# Patient Record
Sex: FEMALE | Race: BLACK OR AFRICAN AMERICAN | NOT HISPANIC OR LATINO | Employment: FULL TIME | URBAN - METROPOLITAN AREA
[De-identification: names, ages, dates, MRNs, and addresses within clinical notes are randomized per-mention and may not be internally consistent; named-entity substitution may affect disease eponyms.]

---

## 2017-04-12 ENCOUNTER — OFFICE VISIT (OUTPATIENT)
Dept: INTERNAL MEDICINE | Facility: CLINIC | Age: 52
End: 2017-04-12
Payer: COMMERCIAL

## 2017-04-12 VITALS
TEMPERATURE: 99 F | DIASTOLIC BLOOD PRESSURE: 84 MMHG | HEART RATE: 85 BPM | SYSTOLIC BLOOD PRESSURE: 132 MMHG | WEIGHT: 233.25 LBS | HEIGHT: 65 IN | OXYGEN SATURATION: 98 % | BODY MASS INDEX: 38.86 KG/M2

## 2017-04-12 DIAGNOSIS — E66.01 SEVERE OBESITY WITH BODY MASS INDEX (BMI) OF 35.0 TO 39.9: ICD-10-CM

## 2017-04-12 DIAGNOSIS — M25.561 ARTHRALGIA OF RIGHT KNEE: Primary | ICD-10-CM

## 2017-04-12 DIAGNOSIS — I10 HYPERTENSION GOAL BP (BLOOD PRESSURE) < 140/90: ICD-10-CM

## 2017-04-12 DIAGNOSIS — E78.5 HYPERLIPIDEMIA LDL GOAL <130: Chronic | ICD-10-CM

## 2017-04-12 PROCEDURE — 99999 PR PBB SHADOW E&M-EST. PATIENT-LVL III: CPT | Mod: PBBFAC,,, | Performed by: INTERNAL MEDICINE

## 2017-04-12 PROCEDURE — 3079F DIAST BP 80-89 MM HG: CPT | Mod: S$GLB,,, | Performed by: INTERNAL MEDICINE

## 2017-04-12 PROCEDURE — 1160F RVW MEDS BY RX/DR IN RCRD: CPT | Mod: S$GLB,,, | Performed by: INTERNAL MEDICINE

## 2017-04-12 PROCEDURE — 3075F SYST BP GE 130 - 139MM HG: CPT | Mod: S$GLB,,, | Performed by: INTERNAL MEDICINE

## 2017-04-12 PROCEDURE — 99214 OFFICE O/P EST MOD 30 MIN: CPT | Mod: S$GLB,,, | Performed by: INTERNAL MEDICINE

## 2017-04-12 NOTE — MR AVS SNAPSHOT
[facility]  [address] [facility] [address]  Phone: [phone]  Fax: [phone]                  Leydi Quintero   2017 4:00 PM   Office Visit    Description:  Female : 1965   Provider:  Miranda Chavez DO   Department:  [facility]           Reason for Visit     stomach issues with Aleve     joint pain           Diagnoses this Visit        Comments    Arthralgia of right knee    -  Primary     Severe obesity with body mass index (BMI) of 35.0 to 39.9                To Do List           Future Appointments        Provider Department Dept Phone    5/3/2017 8:20 AM INTERNAL MEDICINE NURSE, [facility] [phone]      Goals (5 Years of Data)     None       These Medications        Disp Refills Start End    naltrexone-bupropion 8-90 mg TbSR 120 tablet 0 2017     1 tablet daily x 1 week, then 1 tab twice daily x 1 week, then 2 tabs daily and 1 tab in the evening x 1 week, then 2 tablets twice daily.    Pharmacy: [facility] [address] [place] [id number] [facility] [address] AT [facility] [place] [address] [place] [address]  #: [phone]         [facility]     [facility] Nurse Care Line -  Assistance  Unless otherwise directed by your provider, please contact [facility] On-Call, our nurse care line that is available for  assistance.     Registered nurses in the [facility] Center provide: appointment scheduling, clinical advisement, health education, and other advisory services.  Call: [phone] (toll free)               Medications           Message regarding Medications     Verify the changes and/or additions to your medication regime listed below are the same as discussed with your clinician today.  If any of these changes or additions are incorrect, please notify your healthcare provider.        START taking these NEW medications        Refills    naltrexone-bupropion 8-90 mg TbSR 0    Si  "tablet daily x 1 week, then 1 tab twice daily x 1 week, then 2 tabs daily and 1 tab in the evening x 1 week, then 2 tablets twice daily.    Class: Normal           Verify that the below list of medications is an accurate representation of the medications you are currently taking.  If none reported, the list may be blank. If incorrect, please contact your healthcare provider. Carry this list with you in case of emergency.           Current Medications     furosemide (LASIX) 20 MG tablet TAKE 1 TABLET(20 MG) BY MOUTH EVERY DAY    albuterol (PROVENTIL) 2.5 mg /3 mL (0.083 %) nebulizer solution Inhale into the lungs. 1 Solution for Nebulization Inhalation Every 4-6 hours.  J code     fluticasone (FLONASE) 50 mcg/actuation nasal spray 1 spray by Each Nare route 2 (two) times daily.    naltrexone-bupropion 8-90 mg TbSR 1 tablet daily x 1 week, then 1 tab twice daily x 1 week, then 2 tabs daily and 1 tab in the evening x 1 week, then 2 tablets twice daily.    pravastatin (PRAVACHOL) 80 MG tablet Take 1 tablet (80 mg total) by mouth every evening.           Clinical Reference Information           Your Vitals Were     BP Pulse Temp Height    132/84 (BP Location: Left arm, Patient Position: Sitting, BP Method: Manual) 85 99.1 °F (37.3 °C) (Tympanic) 5' 5" (1.651 m)    Weight Last Period SpO2 BMI    105.8 kg (233 lb 4 oz) 11/23/2013 98% 38.81 kg/m2      Blood Pressure          Most Recent Value    BP  132/84      Allergies as of 4/12/2017     Lipitor [Atorvastatin]      Immunizations Administered on Date of Encounter - 4/12/2017     None      Instructions      Bupropion; Naltrexone extended-release tablets  What is this medicine?  BUPROPION; NALTREXONE (byoo PROE pee on; nal TREX one) is a combination product used to promote and maintain weight loss in obese adults or overweight adults who also have weight related medical problems. This medicine should be used with a reduced calorie diet and increased physical " activity.  How should I use this medicine?  Take this medicine by mouth with a glass of water. Follow the directions on the prescription label. Take this medicine in the morning and in the evenings as directed by your healthcare professional. You can take it with or without food. Do not take with high-fat meals as this may increase your risk of seizures. Do not crush, chew, or cut these tablets. Do not take your medicine more often than directed. Do not stop taking this medicine suddenly except upon the advice of your doctor.  A special MedGuide will be given to you by the pharmacist with each prescription and refill. Be sure to read this information carefully each time.   Talk to your pediatrician regarding the use of this medicine in children. Special care may be needed.  What side effects may I notice from receiving this medicine?  Side effects that you should report to your doctor or health care professional as soon as possible:  · allergic reactions like skin rash, itching or hives, swelling of the face, lips, or tongue  · breathing problems  · changes in vision, hearing  · chest pain  · confusion  · dark urine  · depressed mood  · fast or irregular heart beat  · fever  · hallucination, loss of contact with reality  · increased blood pressure  · light-colored stools  · redness, blistering, peeling or loosening of the skin, including inside the mouth  · right upper belly pain  · seizures  · suicidal thoughts or other mood changes  · unusually weak or tired  · vomiting  · yellowing of the eyes or skin  Side effects that usually do not require medical attention (Report these to your doctor or health care professional if they continue or are bothersome.):  · constipation  · diarrhea  · dizziness  · dry mouth  · headache  · nausea  · trouble sleeping  What may interact with this medicine?  Do not take this medicine with any of the following medications:  · any prescription or street opioid drug like codiene, heroin,  methadone  · linezolid  · MAOIs like Carbex, Eldepryl, Marplan, Nardil, and Parnate  · methylene blue (injected into a vein)  · other medicines that contain bupropion like Zyban or Wellbutrin  This medicine may also interact with the following medications:  · alcohol  · certain medicines for anxiety or sleep  · certain medicines for blood pressure like metoprolol, propranolol  · certain medicines for depression or psychotic disturbances  · certain medicines for HIV or AIDS like efavirenz, lopinavir, nelfinavir, ritonavir  · certain medicines for irregular heart beat like propafenone, flecainide  · certain medicines for Parkinson's disease like amantadine, levodopa  · certain medicines for seizures like carbamazepine, phenytoin, phenobarbital  · cimetidine  · clopidogrel  · cyclophosphamide  · disulfiram  · furazolidone  · isoniazid  · nicotine  · orphenadrine  · procarbazine  · steroid medicines like prednisone or cortisone  · stimulant medicines for attention disorders, weight loss, or to stay awake  · tamoxifen  · theophylline  · thioridazine  · thiotepa  · ticlopidine  · tramadol  · warfarin  What if I miss a dose?  If you miss a dose, skip the missed dose and take your next tablet at the regular time. Do not take double or extra doses.  Where should I keep my medicine?  Keep out of the reach of children.  Store at room temperature between 15 and 30 degrees C (59 and 86 degrees F). Throw away any unused medicine after the expiration date.  What should I tell my health care provider before I take this medicine?  They need to know if you have any of these conditions:  · an eating disorder, such as anorexia or bulimia  · diabetes  · glaucoma  · head injury  · heart disease  · high blood pressure  · history of a drug or alcohol abuse problem  · history of a tumor or infection of your brain or spine  · history of stroke  · history of irregular heartbeat  · kidney disease  · liver disease  · mental illness such as  bipolar disorder or psychosis  · seizures  · suicidal thoughts, plans, or attempt; a previous suicide attempt by you or a family member  · an unusual or allergic reaction to bupropion, naltrexone, other medicines, foods, dyes, or preservatives  breast-feeding  · pregnant or trying to become pregnant  What should I watch for while using this medicine?  This medicine is intended to be used in addition to a healthy diet and appropriate exercise. The best results are achieved this way. Do not increase or in any way change your dose without consulting your doctor or health care professional. Do not take this medicine with other prescription or over-the-counter weight loss products without consulting your doctor or health care professional. Your doctor should tell you to stop taking this medicine if you do not lose a certain amount of weight within the first 12 weeks of treatment.   Visit your doctor or health care professional for regular checkups. Your doctor may order blood tests or other tests to see how you are doing.  This medicine may affect blood sugar levels. If you have diabetes, check with your doctor or health care professional before you change your diet or the dose of your diabetic medicine.  Patients and their families should watch out for new or worsening depression or thoughts of suicide. Also watch out for sudden changes in feelings such as feeling anxious, agitated, panicky, irritable, hostile, aggressive, impulsive, severely restless, overly excited and hyperactive, or not being able to sleep. If this happens, especially at the beginning of treatment or after a change in dose, call your health care professional.  Avoid alcoholic drinks while taking this medicine. Drinking large amounts of alcoholic beverages, using sleeping or anxiety medicines, or quickly stopping the use of these agents while taking this medicine may increase your risk for a seizure.  Date Last Reviewed:   NOTE:This sheet is a  summary. It may not cover all possible information. If you have questions about this medicine, talk to your doctor, pharmacist, or health care provider. Copyright© 2016 [employer]             Language Assistance Services     ATTENTION: Language assistance services are available, free of charge. Please call [phone].      ATENCIÓN: Si zuleika hsieh, tiene a kim disposición servicios gratuitos de asistencia lingüística. Llame al [phone].     CHÚ Ý: N?u b?n nói Ti?ng Vi?t, có các d?ch v? h? tr? ngôn ng? mi?n phí dành cho b?n. G?i s? [phone].         [facility] complies with applicable Federal civil rights laws and does not discriminate on the basis of race, color, national origin, age, disability, or sex.

## 2017-04-12 NOTE — PATIENT INSTRUCTIONS
Bupropion; Naltrexone extended-release tablets  What is this medicine?  BUPROPION; NALTREXONE (byoo PROE pee on; nal TREX one) is a combination product used to promote and maintain weight loss in obese adults or overweight adults who also have weight related medical problems. This medicine should be used with a reduced calorie diet and increased physical activity.  How should I use this medicine?  Take this medicine by mouth with a glass of water. Follow the directions on the prescription label. Take this medicine in the morning and in the evenings as directed by your healthcare professional. You can take it with or without food. Do not take with high-fat meals as this may increase your risk of seizures. Do not crush, chew, or cut these tablets. Do not take your medicine more often than directed. Do not stop taking this medicine suddenly except upon the advice of your doctor.  A special MedGuide will be given to you by the pharmacist with each prescription and refill. Be sure to read this information carefully each time.   Talk to your pediatrician regarding the use of this medicine in children. Special care may be needed.  What side effects may I notice from receiving this medicine?  Side effects that you should report to your doctor or health care professional as soon as possible:  · allergic reactions like skin rash, itching or hives, swelling of the face, lips, or tongue  · breathing problems  · changes in vision, hearing  · chest pain  · confusion  · dark urine  · depressed mood  · fast or irregular heart beat  · fever  · hallucination, loss of contact with reality  · increased blood pressure  · light-colored stools  · redness, blistering, peeling or loosening of the skin, including inside the mouth  · right upper belly pain  · seizures  · suicidal thoughts or other mood changes  · unusually weak or tired  · vomiting  · yellowing of the eyes or skin  Side effects that usually do not require medical attention  (Report these to your doctor or health care professional if they continue or are bothersome.):  · constipation  · diarrhea  · dizziness  · dry mouth  · headache  · nausea  · trouble sleeping  What may interact with this medicine?  Do not take this medicine with any of the following medications:  · any prescription or street opioid drug like codiene, heroin, methadone  · linezolid  · MAOIs like Carbex, Eldepryl, Marplan, Nardil, and Parnate  · methylene blue (injected into a vein)  · other medicines that contain bupropion like Zyban or Wellbutrin  This medicine may also interact with the following medications:  · alcohol  · certain medicines for anxiety or sleep  · certain medicines for blood pressure like metoprolol, propranolol  · certain medicines for depression or psychotic disturbances  · certain medicines for HIV or AIDS like efavirenz, lopinavir, nelfinavir, ritonavir  · certain medicines for irregular heart beat like propafenone, flecainide  · certain medicines for Parkinson's disease like amantadine, levodopa  · certain medicines for seizures like carbamazepine, phenytoin, phenobarbital  · cimetidine  · clopidogrel  · cyclophosphamide  · disulfiram  · furazolidone  · isoniazid  · nicotine  · orphenadrine  · procarbazine  · steroid medicines like prednisone or cortisone  · stimulant medicines for attention disorders, weight loss, or to stay awake  · tamoxifen  · theophylline  · thioridazine  · thiotepa  · ticlopidine  · tramadol  · warfarin  What if I miss a dose?  If you miss a dose, skip the missed dose and take your next tablet at the regular time. Do not take double or extra doses.  Where should I keep my medicine?  Keep out of the reach of children.  Store at room temperature between 15 and 30 degrees C (59 and 86 degrees F). Throw away any unused medicine after the expiration date.  What should I tell my health care provider before I take this medicine?  They need to know if you have any of these  conditions:  · an eating disorder, such as anorexia or bulimia  · diabetes  · glaucoma  · head injury  · heart disease  · high blood pressure  · history of a drug or alcohol abuse problem  · history of a tumor or infection of your brain or spine  · history of stroke  · history of irregular heartbeat  · kidney disease  · liver disease  · mental illness such as bipolar disorder or psychosis  · seizures  · suicidal thoughts, plans, or attempt; a previous suicide attempt by you or a family member  · an unusual or allergic reaction to bupropion, naltrexone, other medicines, foods, dyes, or preservatives  breast-feeding  · pregnant or trying to become pregnant  What should I watch for while using this medicine?  This medicine is intended to be used in addition to a healthy diet and appropriate exercise. The best results are achieved this way. Do not increase or in any way change your dose without consulting your doctor or health care professional. Do not take this medicine with other prescription or over-the-counter weight loss products without consulting your doctor or health care professional. Your doctor should tell you to stop taking this medicine if you do not lose a certain amount of weight within the first 12 weeks of treatment.   Visit your doctor or health care professional for regular checkups. Your doctor may order blood tests or other tests to see how you are doing.  This medicine may affect blood sugar levels. If you have diabetes, check with your doctor or health care professional before you change your diet or the dose of your diabetic medicine.  Patients and their families should watch out for new or worsening depression or thoughts of suicide. Also watch out for sudden changes in feelings such as feeling anxious, agitated, panicky, irritable, hostile, aggressive, impulsive, severely restless, overly excited and hyperactive, or not being able to sleep. If this happens, especially at the beginning of  treatment or after a change in dose, call your health care professional.  Avoid alcoholic drinks while taking this medicine. Drinking large amounts of alcoholic beverages, using sleeping or anxiety medicines, or quickly stopping the use of these agents while taking this medicine may increase your risk for a seizure.  Date Last Reviewed:   NOTE:This sheet is a summary. It may not cover all possible information. If you have questions about this medicine, talk to your doctor, pharmacist, or health care provider. Copyright© 2016 [employer]

## 2017-04-14 NOTE — PROGRESS NOTES
Subjective:       Patient ID: Leydi Quintero is a 51 y.o. female.    Chief Complaint: Knee Pain    HPI Comments: Leydi Quintero  51 y.o. Black or  female    Patient presents with:  Knee Pain    HPI: Presents to the clinic with complaint of knee pain. She states it has been going on for a while and is worse at the end of the day. She has stiffness mostly in the morning. She denies swelling or redness. She denies trauma. She has been taking Aleve and states it causes stomach upset even when taken with food. She knows her weight has a lot to do with it so she wants to lose weight. She and several co-workers are wanting to lose together. She is interested in Contrave. She has a coupon.   She does have a history of HTN, well controlled on furosemide.   She has HLD and takes pravastatin. Her lipids are uncontrolled. She is due for repeat labs.                   CHOL                     349 (H)             09/03/2016                  HDL                      49                  09/03/2016                 LDLCALC                  261.8 (H)           09/03/2016                 TRIG                     191 (H)             09/03/2016                 Past Medical History:  Allergic rhinitis  Anxiety  Asthma  Hyperlipidemia  Hypertension  Sarcoidosis    Current Outpatient Prescriptions on File Prior to Visit:  furosemide (LASIX) 20 MG tablet, TAKE 1 TABLET(20 MG) BY MOUTH EVERY DAY, Disp: 30 tablet, Rfl: 6  albuterol (PROVENTIL) 2.5 mg /3 mL (0.083 %) nebulizer solution, Inhale into the lungs. 1 Solution for Nebulization Inhalation Every 4-6 hours.  J code , Disp: , Rfl:   fluticasone (FLONASE) 50 mcg/actuation nasal spray, 1 spray by Each Nare route 2 (two) times daily., Disp: 16 g, Rfl: 3  pravastatin (PRAVACHOL) 80 MG tablet, Take 1 tablet (80 mg total) by mouth every evening., Disp: 30 tablet, Rfl: 6    Allergies:  Review of patient's allergies indicates:   -- Lipitor (atorvastatin)      --  Other reaction(s): Muscle pain      Review of Systems   Constitutional: Negative for fever and unexpected weight change.   Respiratory: Negative for shortness of breath.    Cardiovascular: Negative for chest pain and leg swelling.   Gastrointestinal: Positive for abdominal pain. Negative for blood in stool.   Musculoskeletal: Positive for arthralgias. Negative for joint swelling.   Neurological: Negative for dizziness and headaches.       Objective:      Physical Exam   Constitutional: She is oriented to person, place, and time. She appears well-developed and well-nourished. No distress.   Eyes: No scleral icterus.   Cardiovascular: Normal rate.    Pulmonary/Chest: Effort normal. No respiratory distress.   Neurological: She is alert and oriented to person, place, and time.   Skin: Skin is warm and dry.   Psychiatric: She has a normal mood and affect.   Vitals reviewed.      Assessment:       1. Arthralgia of right knee    2. Severe obesity with body mass index (BMI) of 35.0 to 39.9    3. Hypertension goal BP (blood pressure) < 140/90    4. Hyperlipidemia LDL goal <130        Plan:       Leydi was seen today for knee pain.    Diagnoses and all orders for this visit:    Arthralgia of right knee  -     Likely OA  -     Agree with weight loss efforts  -     Recommend Tylenol    Severe obesity with body mass index (BMI) of 35.0 to 39.9  -     naltrexone-bupropion 8-90 mg TbSR; 1 tablet daily x 1 week, then 1 tab twice daily x 1 week, then 2 tabs daily and 1 tab in the evening x 1 week, then 2 tablets twice daily.    Hypertension goal BP (blood pressure) < 140/90  -     Continue furosemide  -     Monitor    Hyperlipidemia LDL goal <130  -     Check lipid panel    F/U in 1 month for weight and b/p check

## 2017-05-03 ENCOUNTER — LAB VISIT (OUTPATIENT)
Dept: LAB | Facility: HOSPITAL | Age: 52
End: 2017-05-03
Attending: INTERNAL MEDICINE
Payer: COMMERCIAL

## 2017-05-03 ENCOUNTER — CLINICAL SUPPORT (OUTPATIENT)
Dept: INTERNAL MEDICINE | Facility: CLINIC | Age: 52
End: 2017-05-03
Payer: COMMERCIAL

## 2017-05-03 VITALS
BODY MASS INDEX: 37.97 KG/M2 | WEIGHT: 228.19 LBS | HEART RATE: 69 BPM | DIASTOLIC BLOOD PRESSURE: 84 MMHG | OXYGEN SATURATION: 99 % | SYSTOLIC BLOOD PRESSURE: 132 MMHG

## 2017-05-03 DIAGNOSIS — E66.01 SEVERE OBESITY WITH BODY MASS INDEX (BMI) OF 35.0 TO 39.9: ICD-10-CM

## 2017-05-03 DIAGNOSIS — E78.5 HYPERLIPIDEMIA WITH TARGET LDL LESS THAN 130: ICD-10-CM

## 2017-05-03 DIAGNOSIS — I10 HYPERTENSION GOAL BP (BLOOD PRESSURE) < 140/90: ICD-10-CM

## 2017-05-03 LAB
ALBUMIN SERPL BCP-MCNC: 3.6 G/DL
ALP SERPL-CCNC: 90 U/L
ALT SERPL W/O P-5'-P-CCNC: 11 U/L
ANION GAP SERPL CALC-SCNC: 10 MMOL/L
AST SERPL-CCNC: 15 U/L
BILIRUB SERPL-MCNC: 0.6 MG/DL
BUN SERPL-MCNC: 10 MG/DL
CALCIUM SERPL-MCNC: 9.9 MG/DL
CHLORIDE SERPL-SCNC: 110 MMOL/L
CHOLEST/HDLC SERPL: 4.2 {RATIO}
CO2 SERPL-SCNC: 24 MMOL/L
CREAT SERPL-MCNC: 1.1 MG/DL
EST. GFR  (AFRICAN AMERICAN): >60 ML/MIN/1.73 M^2
EST. GFR  (NON AFRICAN AMERICAN): 57.5 ML/MIN/1.73 M^2
GLUCOSE SERPL-MCNC: 109 MG/DL
HDL/CHOLESTEROL RATIO: 24 %
HDLC SERPL-MCNC: 229 MG/DL
HDLC SERPL-MCNC: 55 MG/DL
LDLC SERPL CALC-MCNC: 142.8 MG/DL
NONHDLC SERPL-MCNC: 174 MG/DL
POTASSIUM SERPL-SCNC: 4.2 MMOL/L
PROT SERPL-MCNC: 7.6 G/DL
SODIUM SERPL-SCNC: 144 MMOL/L
TRIGL SERPL-MCNC: 156 MG/DL

## 2017-05-03 PROCEDURE — 80061 LIPID PANEL: CPT

## 2017-05-03 PROCEDURE — 80053 COMPREHEN METABOLIC PANEL: CPT

## 2017-05-03 PROCEDURE — 36415 COLL VENOUS BLD VENIPUNCTURE: CPT

## 2017-05-03 PROCEDURE — 99999 PR PBB SHADOW E&M-EST. PATIENT-LVL III: CPT | Mod: PBBFAC,,,

## 2017-05-03 NOTE — PROGRESS NOTES
B/P and heart rate are stable.   5 pound weight loss noted.   Continue Contrave.  Nurse b/p, hr and weight visit in 1 month.

## 2017-05-03 NOTE — PROGRESS NOTES
Patient came in for blood pressure and weight check. 132/84, pulse 69 weight 228. Patient denies having any symptoms.

## 2017-06-08 ENCOUNTER — TELEPHONE (OUTPATIENT)
Dept: FAMILY MEDICINE | Facility: CLINIC | Age: 52
End: 2017-06-08

## 2017-06-08 NOTE — TELEPHONE ENCOUNTER
----- Message from Ramsey Costello sent at 6/8/2017  8:51 AM CDT -----  Contact: pt  She's calling in regard to rescheduling her nurse visit, please advise, [phone] (home)

## 2017-06-12 RX ORDER — FUROSEMIDE 20 MG/1
TABLET ORAL
Qty: 30 TABLET | Refills: 3 | Status: SHIPPED | OUTPATIENT
Start: 2017-06-12 | End: 2017-09-22 | Stop reason: SDUPTHER

## 2017-06-12 RX ORDER — FUROSEMIDE 20 MG/1
TABLET ORAL
Qty: 30 TABLET | Refills: 0 | OUTPATIENT
Start: 2017-06-12

## 2017-07-03 ENCOUNTER — OFFICE VISIT (OUTPATIENT)
Dept: INTERNAL MEDICINE | Facility: CLINIC | Age: 52
End: 2017-07-03
Payer: COMMERCIAL

## 2017-07-03 VITALS
OXYGEN SATURATION: 96 % | TEMPERATURE: 98 F | DIASTOLIC BLOOD PRESSURE: 82 MMHG | WEIGHT: 235.25 LBS | SYSTOLIC BLOOD PRESSURE: 130 MMHG | BODY MASS INDEX: 39.2 KG/M2 | HEIGHT: 65 IN | HEART RATE: 92 BPM

## 2017-07-03 DIAGNOSIS — J45.21 MILD INTERMITTENT ASTHMA WITH ACUTE EXACERBATION: Primary | ICD-10-CM

## 2017-07-03 PROCEDURE — 99214 OFFICE O/P EST MOD 30 MIN: CPT | Mod: 25,S$GLB,, | Performed by: INTERNAL MEDICINE

## 2017-07-03 PROCEDURE — 94640 AIRWAY INHALATION TREATMENT: CPT | Mod: 59,S$GLB,, | Performed by: INTERNAL MEDICINE

## 2017-07-03 PROCEDURE — 96372 THER/PROPH/DIAG INJ SC/IM: CPT | Mod: S$GLB,,, | Performed by: INTERNAL MEDICINE

## 2017-07-03 PROCEDURE — 99999 PR PBB SHADOW E&M-EST. PATIENT-LVL III: CPT | Mod: PBBFAC,,, | Performed by: INTERNAL MEDICINE

## 2017-07-03 RX ORDER — METHYLPREDNISOLONE ACETATE 40 MG/ML
40 INJECTION, SUSPENSION INTRA-ARTICULAR; INTRALESIONAL; INTRAMUSCULAR; SOFT TISSUE
Status: COMPLETED | OUTPATIENT
Start: 2017-07-03 | End: 2017-07-03

## 2017-07-03 RX ORDER — IPRATROPIUM BROMIDE AND ALBUTEROL SULFATE 2.5; .5 MG/3ML; MG/3ML
3 SOLUTION RESPIRATORY (INHALATION)
Status: COMPLETED | OUTPATIENT
Start: 2017-07-03 | End: 2017-07-03

## 2017-07-03 RX ORDER — ALBUTEROL SULFATE 0.63 MG/3ML
0.63 SOLUTION RESPIRATORY (INHALATION) EVERY 6 HOURS PRN
Qty: 300 ML | Refills: 3 | Status: SHIPPED | OUTPATIENT
Start: 2017-07-03 | End: 2018-10-25

## 2017-07-03 RX ADMIN — METHYLPREDNISOLONE ACETATE 40 MG: 40 INJECTION, SUSPENSION INTRA-ARTICULAR; INTRALESIONAL; INTRAMUSCULAR; SOFT TISSUE at 05:07

## 2017-07-03 RX ADMIN — IPRATROPIUM BROMIDE AND ALBUTEROL SULFATE 3 ML: 2.5; .5 SOLUTION RESPIRATORY (INHALATION) at 05:07

## 2017-07-05 NOTE — PROGRESS NOTES
Leydi Victoria Quintero  51 y.o. Black or  female     Chief Complaint   Patient presents with    Asthma        HPI: Presents to the clinic with complaint of worsening asthma over the past week. She has had wheezing and shortness of breath. She has been using her rescue inhaler without relief. She would like to have a nebulizer machine at home because it has worked well in the past. She denies fever, cough or chest pain.     PMH: Reviewed    MEDS: Reviewed med card    ALLERGIES: Reviewed allergy card    PE: Reviewed vitals  GENERAL: Alert and oriented, no acute distress  HEART: Regular rate and rhythm  LUNGS: Unlabored respirations, bilaterally clear to auscultation     ASSESSMENT/PLAN:    Leydi was seen today for asthma.    Diagnoses and all orders for this visit:    Mild intermittent asthma with acute exacerbation  -     NEBULIZER FOR HOME USE  -     albuterol (ACCUNEB) 0.63 mg/3 mL Nebu; Take 3 mLs (0.63 mg total) by nebulization every 6 (six) hours as needed. Rescue  -     methylPREDNISolone acetate injection 40 mg; Inject 1 mL (40 mg total) into the muscle one time.  -     albuterol-ipratropium 2.5mg-0.5mg/3mL nebulizer solution 3 mL; Take 3 mLs by nebulization one time.    RTC: If symptoms worsen or fail to resolve

## 2017-07-06 DIAGNOSIS — E78.5 HYPERLIPIDEMIA WITH TARGET LDL LESS THAN 130: ICD-10-CM

## 2017-07-07 RX ORDER — PRAVASTATIN SODIUM 80 MG/1
TABLET ORAL
Qty: 30 TABLET | Refills: 3 | Status: SHIPPED | OUTPATIENT
Start: 2017-07-07 | End: 2018-02-12 | Stop reason: SDUPTHER

## 2017-07-11 ENCOUNTER — TELEPHONE (OUTPATIENT)
Dept: INTERNAL MEDICINE | Facility: CLINIC | Age: 52
End: 2017-07-11

## 2017-07-11 NOTE — TELEPHONE ENCOUNTER
----- Message from Nai Castillo sent at 7/7/2017 11:13 AM CDT -----  Contact: [phone]  Patient would like to consult with nurse regarding nebulizer. Please call back at [phone].      Thanks,  Nai Castillo

## 2017-07-11 NOTE — TELEPHONE ENCOUNTER
Contacted patient to inform her that an order has been faxed over to Medical company. Patient verbalized an understanding.

## 2017-07-31 ENCOUNTER — TELEPHONE (OUTPATIENT)
Dept: OBSTETRICS AND GYNECOLOGY | Facility: CLINIC | Age: 52
End: 2017-07-31

## 2017-07-31 DIAGNOSIS — Z01.419 WELL WOMAN EXAM: Primary | ICD-10-CM

## 2017-07-31 NOTE — TELEPHONE ENCOUNTER
----- Message from Tia Choudhary sent at 7/31/2017 11:32 AM CDT -----  Contact: pt  States she would like to schedule her mammogram on 8/31 in the morning at [facility], she see's Dr Ralph on 8/31 at 8:45. Please call pt at [phone]. Thank you

## 2017-08-31 ENCOUNTER — OFFICE VISIT (OUTPATIENT)
Dept: OBSTETRICS AND GYNECOLOGY | Facility: CLINIC | Age: 52
End: 2017-08-31
Payer: COMMERCIAL

## 2017-08-31 ENCOUNTER — HOSPITAL ENCOUNTER (OUTPATIENT)
Dept: RADIOLOGY | Facility: HOSPITAL | Age: 52
Discharge: HOME | End: 2017-08-31
Attending: OBSTETRICS & GYNECOLOGY
Payer: COMMERCIAL

## 2017-08-31 VITALS
WEIGHT: 241.63 LBS | HEIGHT: 65 IN | SYSTOLIC BLOOD PRESSURE: 126 MMHG | BODY MASS INDEX: 40.26 KG/M2 | DIASTOLIC BLOOD PRESSURE: 84 MMHG

## 2017-08-31 DIAGNOSIS — Z01.419 WELL WOMAN EXAM: ICD-10-CM

## 2017-08-31 DIAGNOSIS — Z01.419 ENCOUNTER FOR GYNECOLOGICAL EXAMINATION WITHOUT ABNORMAL FINDING: Primary | ICD-10-CM

## 2017-08-31 PROCEDURE — 77063 BREAST TOMOSYNTHESIS BI: CPT | Mod: 26,,, | Performed by: RADIOLOGY

## 2017-08-31 PROCEDURE — 77067 SCR MAMMO BI INCL CAD: CPT | Mod: TC

## 2017-08-31 PROCEDURE — 99396 PREV VISIT EST AGE 40-64: CPT | Mod: S$GLB,,, | Performed by: OBSTETRICS & GYNECOLOGY

## 2017-08-31 PROCEDURE — 99999 PR PBB SHADOW E&M-EST. PATIENT-LVL III: CPT | Mod: PBBFAC,,, | Performed by: OBSTETRICS & GYNECOLOGY

## 2017-08-31 PROCEDURE — 77067 SCR MAMMO BI INCL CAD: CPT | Mod: 26,,, | Performed by: RADIOLOGY

## 2017-09-21 ENCOUNTER — TELEPHONE (OUTPATIENT)
Dept: INTERNAL MEDICINE | Facility: CLINIC | Age: 52
End: 2017-09-21

## 2017-09-21 NOTE — TELEPHONE ENCOUNTER
----- Message from Mitzy Juarez sent at 9/21/2017 10:41 AM CDT -----  Contact: pt   Call pt regarding a fax that she sent over.   ...[phone]

## 2017-09-21 NOTE — TELEPHONE ENCOUNTER
Returned pt call regarding a fax she wants to know if we received it.   Relayed to pt that we did not receive fax and asked if she could resend the fax.  Verified correct fax number with pt.  Pt states that she would refax.

## 2017-09-22 RX ORDER — FUROSEMIDE 20 MG/1
TABLET ORAL
Qty: 30 TABLET | Refills: 6 | Status: SHIPPED | OUTPATIENT
Start: 2017-09-22 | End: 2018-05-09

## 2017-09-25 ENCOUNTER — TELEPHONE (OUTPATIENT)
Dept: INTERNAL MEDICINE | Facility: CLINIC | Age: 52
End: 2017-09-25

## 2017-09-25 NOTE — TELEPHONE ENCOUNTER
Pt needing clearance from PCP to take Qsymia for weight loss. Appt scheduled 09/27/17 @ 2pm for f/u with Dr. Chavez to discuss. No forms needed, documentation in progress notes that its ok to take is being requested.

## 2017-09-25 NOTE — TELEPHONE ENCOUNTER
----- Message from Rosalia Victoria sent at 9/25/2017 10:25 AM CDT -----  Contact: Patient  Patient states she has left other messages regarding a referral but has not heard back, please call her back at [phone]. Thank you

## 2017-09-25 NOTE — TELEPHONE ENCOUNTER
----- Message from lAina Hoffman sent at 9/25/2017  9:20 AM CDT -----  Contact: Patient  Patient called to speak with the nurse; she stated that Dr. Chavez was supposed to send something to another doctors office giving them permission to treat her and her appointment with them is today at 12:30 and they haven't received anything.     She can be contacted at [phone].    Thanks,  Alina

## 2017-09-25 NOTE — TELEPHONE ENCOUNTER
----- Message from Josefa Dean sent at 9/25/2017 10:35 AM CDT -----  Milena with Dr Warren//weight loss surgeon at [phone]//states the pt has called several time to get a clearance for weight loss medication//pt has appt this afternoon//fax is [phone]//please call if any questions//ella/derrick

## 2017-09-27 ENCOUNTER — OFFICE VISIT (OUTPATIENT)
Dept: INTERNAL MEDICINE | Facility: CLINIC | Age: 52
End: 2017-09-27
Payer: COMMERCIAL

## 2017-09-27 VITALS
WEIGHT: 247.38 LBS | OXYGEN SATURATION: 96 % | HEIGHT: 65 IN | HEART RATE: 85 BPM | SYSTOLIC BLOOD PRESSURE: 122 MMHG | DIASTOLIC BLOOD PRESSURE: 76 MMHG | TEMPERATURE: 98 F | BODY MASS INDEX: 41.22 KG/M2

## 2017-09-27 DIAGNOSIS — Z23 IMMUNIZATION DUE: ICD-10-CM

## 2017-09-27 DIAGNOSIS — E78.5 HYPERLIPIDEMIA LDL GOAL <130: Chronic | ICD-10-CM

## 2017-09-27 DIAGNOSIS — J45.20 MILD INTERMITTENT ASTHMA WITHOUT COMPLICATION: ICD-10-CM

## 2017-09-27 DIAGNOSIS — I10 HYPERTENSION GOAL BP (BLOOD PRESSURE) < 140/90: Primary | Chronic | ICD-10-CM

## 2017-09-27 DIAGNOSIS — E66.01 MORBID OBESITY WITH BMI OF 40.0-44.9, ADULT: ICD-10-CM

## 2017-09-27 PROCEDURE — 90686 IIV4 VACC NO PRSV 0.5 ML IM: CPT | Mod: S$GLB,,, | Performed by: INTERNAL MEDICINE

## 2017-09-27 PROCEDURE — 3074F SYST BP LT 130 MM HG: CPT | Mod: S$GLB,,, | Performed by: INTERNAL MEDICINE

## 2017-09-27 PROCEDURE — 3008F BODY MASS INDEX DOCD: CPT | Mod: S$GLB,,, | Performed by: INTERNAL MEDICINE

## 2017-09-27 PROCEDURE — 99213 OFFICE O/P EST LOW 20 MIN: CPT | Mod: 25,S$GLB,, | Performed by: INTERNAL MEDICINE

## 2017-09-27 PROCEDURE — 99999 PR PBB SHADOW E&M-EST. PATIENT-LVL III: CPT | Mod: PBBFAC,,, | Performed by: INTERNAL MEDICINE

## 2017-09-27 PROCEDURE — 90471 IMMUNIZATION ADMIN: CPT | Mod: S$GLB,,, | Performed by: INTERNAL MEDICINE

## 2017-09-27 PROCEDURE — 3078F DIAST BP <80 MM HG: CPT | Mod: S$GLB,,, | Performed by: INTERNAL MEDICINE

## 2017-09-27 NOTE — PROGRESS NOTES
Leydi Victoria Quintero  51 y.o. Black or  female    Chief Complaint   Patient presents with    Follow-up     HPI: Here today to follow up on chronic conditions.  HTN--stable on furosemide.   HLD--compliant with pravastatin.   Lab Results   Component Value Date    LDLCALC 142.8 05/03/2017   Asthma--stable. She denies recent flares.   She is going to a weight loss clinic and wants to start Qsymia. She needs medical clearance.     Past Medical History:   Diagnosis Date    Allergic rhinitis     Anxiety     Asthma     Hyperlipidemia     Hypertension     Sarcoidosis        Current Outpatient Prescriptions on File Prior to Visit   Medication Sig Dispense Refill    albuterol (ACCUNEB) 0.63 mg/3 mL Nebu Take 3 mLs (0.63 mg total) by nebulization every 6 (six) hours as needed. Rescue 300 mL 3    fluticasone (FLONASE) 50 mcg/actuation nasal spray 1 spray by Each Nare route 2 (two) times daily. 16 g 3    furosemide (LASIX) 20 MG tablet TAKE 1 TABLET(20 MG) BY MOUTH EVERY DAY 30 tablet 6    pravastatin (PRAVACHOL) 80 MG tablet TAKE 1 TABLET(80 MG) BY MOUTH EVERY EVENING 30 tablet 3     No current facility-administered medications on file prior to visit.        Allergies:  Review of patient's allergies indicates:   Allergen Reactions    Lipitor [atorvastatin]      Other reaction(s): Muscle pain       ROS:  Denies headache, dizziness, chest pain, palpitations or shortness of breath    PHYSICAL EXAM:  VITAL SIGNS: Reviewed  GENERAL: Alert and oriented, no acute distress  HEART: Normal S1 and S2, regular rate and rhythm  LUNGS: Bilaterally clear to auscultation, respirations unlabored    ASSESSMENT/PLAN:  Leydi was seen today for follow-up.    Diagnoses and all orders for this visit:    Hypertension goal BP (blood pressure) < 140/90  -     Continue current management    Hyperlipidemia LDL goal <130  -     Continue pravastatin  -     Discussed lifestyle modifications    Mild intermittent asthma without  complication  -     Stable    Morbid obesity with BMI of 40.0-44.9, adult  -     Counseled regarding lifestyle modification  -     Okay to start Qsymia with monitoring    Immunization due  -     Influenza - Quadrivalent (3 years & older) (PF)    Okay to start Qsymia. Recommend frequent follow up to monitor b/p, heart rate and progress.     F/U in 6 months

## 2017-09-28 ENCOUNTER — TELEPHONE (OUTPATIENT)
Dept: INTERNAL MEDICINE | Facility: CLINIC | Age: 52
End: 2017-09-28

## 2017-09-28 NOTE — TELEPHONE ENCOUNTER
----- Message from Lani Oh sent at 9/28/2017 12:40 PM CDT -----  Contact: pt   Pt is [facility] the [facility] and the office was suppose to fax her paper work to dr office, and nothing was done,,, please call pt back [phone]

## 2017-10-02 ENCOUNTER — OFFICE VISIT (OUTPATIENT)
Dept: INTERNAL MEDICINE | Facility: CLINIC | Age: 52
End: 2017-10-02
Payer: COMMERCIAL

## 2017-10-02 VITALS
BODY MASS INDEX: 40.4 KG/M2 | TEMPERATURE: 98 F | OXYGEN SATURATION: 97 % | HEIGHT: 65 IN | WEIGHT: 242.5 LBS | SYSTOLIC BLOOD PRESSURE: 148 MMHG | DIASTOLIC BLOOD PRESSURE: 100 MMHG | HEART RATE: 80 BPM

## 2017-10-02 DIAGNOSIS — I10 HYPERTENSION GOAL BP (BLOOD PRESSURE) < 140/90: Primary | ICD-10-CM

## 2017-10-02 PROCEDURE — 3080F DIAST BP >= 90 MM HG: CPT | Mod: S$GLB,,, | Performed by: INTERNAL MEDICINE

## 2017-10-02 PROCEDURE — 99213 OFFICE O/P EST LOW 20 MIN: CPT | Mod: S$GLB,,, | Performed by: INTERNAL MEDICINE

## 2017-10-02 PROCEDURE — 99999 PR PBB SHADOW E&M-EST. PATIENT-LVL III: CPT | Mod: PBBFAC,,, | Performed by: INTERNAL MEDICINE

## 2017-10-02 PROCEDURE — 3077F SYST BP >= 140 MM HG: CPT | Mod: S$GLB,,, | Performed by: INTERNAL MEDICINE

## 2017-10-02 PROCEDURE — 3008F BODY MASS INDEX DOCD: CPT | Mod: S$GLB,,, | Performed by: INTERNAL MEDICINE

## 2017-10-02 NOTE — PROGRESS NOTES
Leydi Victoria Quintreo  51 y.o.  Black or  female    Chief Complaint   Patient presents with    Hypertension       HPI:  Presents to the clinic with complaint of hypertension. She was seen a week ago and her b/p was well controlled. She admits to taking Evelia [place] Plus following her visit and starting Qsymia after that. She also admits to eating out following the visit at [facility]. She denies symptoms.     PMH: Reviewed    MEDS: Reviewed med card    ALLERGIES: Reviewed allergy card    PE: Reviewed vitals  GENERAL: Alert and oriented, no acute distress  HEART: Regular rate  LUNGS: Unlabored respirations     ASSESSMENT/PLAN:    Leydi was seen today for hypertension.    Diagnoses and all orders for this visit:    Hypertension goal BP (blood pressure) < 140/90  -     Advised to avoid OTC cold or sinus medication  -     Advised to hold Qsymia and monitor b/p at home  -     Discussed b/p goals  -     Recommend continuing furosemide  -     Recommend following a heart healthy diet    Advised to notify me of b/p readings in the next 1-2 weeks.     If b/p normalizes, she can try Qsymia again and closely monitor b/p.      Thank you very much for this referral. Enclosed you'll find my consultation notes. I will followup with our patient as summarized.

## 2018-02-06 ENCOUNTER — OFFICE VISIT (OUTPATIENT)
Dept: INTERNAL MEDICINE | Facility: CLINIC | Age: 53
End: 2018-02-06
Payer: COMMERCIAL

## 2018-02-06 VITALS
SYSTOLIC BLOOD PRESSURE: 132 MMHG | HEART RATE: 92 BPM | TEMPERATURE: 100 F | DIASTOLIC BLOOD PRESSURE: 86 MMHG | OXYGEN SATURATION: 96 % | WEIGHT: 235.69 LBS | BODY MASS INDEX: 39.27 KG/M2 | HEIGHT: 65 IN

## 2018-02-06 DIAGNOSIS — D86.9 SARCOIDOSIS: ICD-10-CM

## 2018-02-06 DIAGNOSIS — J40 BRONCHITIS: Primary | ICD-10-CM

## 2018-02-06 PROCEDURE — 99999 PR PBB SHADOW E&M-EST. PATIENT-LVL III: CPT | Mod: PBBFAC,,, | Performed by: PHYSICIAN ASSISTANT

## 2018-02-06 PROCEDURE — 99214 OFFICE O/P EST MOD 30 MIN: CPT | Mod: S$GLB,,, | Performed by: PHYSICIAN ASSISTANT

## 2018-02-06 PROCEDURE — 3008F BODY MASS INDEX DOCD: CPT | Mod: S$GLB,,, | Performed by: PHYSICIAN ASSISTANT

## 2018-02-06 RX ORDER — AZITHROMYCIN 250 MG/1
TABLET, FILM COATED ORAL
Qty: 6 TABLET | Refills: 0 | Status: SHIPPED | OUTPATIENT
Start: 2018-02-06 | End: 2018-04-04 | Stop reason: ALTCHOICE

## 2018-02-06 RX ORDER — PROMETHAZINE HYDROCHLORIDE AND DEXTROMETHORPHAN HYDROBROMIDE 6.25; 15 MG/5ML; MG/5ML
5 SYRUP ORAL 3 TIMES DAILY PRN
Qty: 240 ML | Refills: 0 | Status: SHIPPED | OUTPATIENT
Start: 2018-02-06 | End: 2018-02-16

## 2018-02-06 RX ORDER — PREDNISONE 10 MG/1
TABLET ORAL
Qty: 18 TABLET | Refills: 0 | Status: SHIPPED | OUTPATIENT
Start: 2018-02-06 | End: 2018-04-04 | Stop reason: ALTCHOICE

## 2018-02-06 NOTE — PROGRESS NOTES
Subjective:       Patient ID: Leydi Quintero is a 52 y.o. female.    Chief Complaint: URI    URI    This is a new problem. The current episode started in the past 7 days. The problem has been gradually worsening. There has been no fever. Associated symptoms include congestion, coughing, headaches, rhinorrhea and sinus pain. Pertinent negatives include no chest pain. Treatments tried: mucinex.     Review of Systems   Constitutional: Positive for chills, fatigue and fever.   HENT: Positive for congestion, rhinorrhea and sinus pain.    Respiratory: Positive for cough. Negative for chest tightness.    Cardiovascular: Negative for chest pain.   Neurological: Positive for headaches.       Objective:      Physical Exam   Constitutional: She appears well-developed and well-nourished. No distress.   HENT:   Head: Normocephalic and atraumatic.   Right Ear: Tympanic membrane and ear canal normal.   Left Ear: Tympanic membrane and ear canal normal.   Nose: Mucosal edema and rhinorrhea present.   Mouth/Throat: Posterior oropharyngeal erythema present. No tonsillar exudate.   Neck: Neck supple.   Cardiovascular: Normal rate and regular rhythm.  Exam reveals no gallop and no friction rub.    No murmur heard.  Pulmonary/Chest: Effort normal and breath sounds normal. No respiratory distress. She has no wheezes. She has no rales. She exhibits no tenderness.   Lymphadenopathy:     She has no cervical adenopathy.   Skin: She is not diaphoretic.   Nursing note and vitals reviewed.      Assessment:       1. Bronchitis    2. Sarcoidosis        Plan:       Bronchitis    Sarcoidosis    Other orders  -     predniSONE (DELTASONE) 10 MG tablet; Take 3 daily for 3 days, then 2 daily for three days, then 1 daily for three days.  Dispense: 18 tablet; Refill: 0  -     azithromycin (Z-JOSE) 250 MG tablet; Follow instructions on pack.  Dispense: 6 tablet; Refill: 0  -     promethazine-dextromethorphan (PROMETHAZINE-DM) 6.25-15 mg/5 mL Syrp;  Take 5 mLs by mouth 3 (three) times daily as needed.  Dispense: 240 mL; Refill: 0

## 2018-02-12 DIAGNOSIS — E78.5 HYPERLIPIDEMIA WITH TARGET LDL LESS THAN 130: ICD-10-CM

## 2018-02-12 RX ORDER — PRAVASTATIN SODIUM 80 MG/1
TABLET ORAL
Qty: 30 TABLET | Refills: 0 | Status: SHIPPED | OUTPATIENT
Start: 2018-02-12 | End: 2018-03-26 | Stop reason: SDUPTHER

## 2018-03-26 DIAGNOSIS — E78.5 HYPERLIPIDEMIA WITH TARGET LDL LESS THAN 130: ICD-10-CM

## 2018-03-27 RX ORDER — PRAVASTATIN SODIUM 80 MG/1
TABLET ORAL
Qty: 30 TABLET | Refills: 3 | Status: SHIPPED | OUTPATIENT
Start: 2018-03-27 | End: 2018-11-08 | Stop reason: SDUPTHER

## 2018-04-04 ENCOUNTER — OFFICE VISIT (OUTPATIENT)
Dept: INTERNAL MEDICINE | Facility: CLINIC | Age: 53
End: 2018-04-04
Payer: COMMERCIAL

## 2018-04-04 ENCOUNTER — HOSPITAL ENCOUNTER (OUTPATIENT)
Dept: RADIOLOGY | Facility: HOSPITAL | Age: 53
Discharge: HOME | End: 2018-04-04
Attending: FAMILY MEDICINE
Payer: COMMERCIAL

## 2018-04-04 VITALS
HEART RATE: 76 BPM | OXYGEN SATURATION: 99 % | SYSTOLIC BLOOD PRESSURE: 132 MMHG | DIASTOLIC BLOOD PRESSURE: 98 MMHG | TEMPERATURE: 98 F | WEIGHT: 239.44 LBS | BODY MASS INDEX: 39.89 KG/M2 | HEIGHT: 65 IN

## 2018-04-04 DIAGNOSIS — R68.89 GENERAL ILL FEELING: ICD-10-CM

## 2018-04-04 DIAGNOSIS — R30.0 DYSURIA: Primary | ICD-10-CM

## 2018-04-04 DIAGNOSIS — I10 HYPERTENSION GOAL BP (BLOOD PRESSURE) < 140/90: ICD-10-CM

## 2018-04-04 DIAGNOSIS — R11.0 NAUSEA: ICD-10-CM

## 2018-04-04 DIAGNOSIS — R35.0 URINARY FREQUENCY: ICD-10-CM

## 2018-04-04 DIAGNOSIS — R63.1 POLYDIPSIA: ICD-10-CM

## 2018-04-04 DIAGNOSIS — R19.7 DIARRHEA, UNSPECIFIED TYPE: ICD-10-CM

## 2018-04-04 DIAGNOSIS — E78.5 HYPERLIPIDEMIA LDL GOAL <130: ICD-10-CM

## 2018-04-04 DIAGNOSIS — R53.83 OTHER FATIGUE: ICD-10-CM

## 2018-04-04 LAB
BACTERIA #/AREA URNS HPF: ABNORMAL /HPF
BILIRUB UR QL STRIP: NEGATIVE
CLARITY UR: CLEAR
COLOR UR: YELLOW
GLUCOSE UR QL STRIP: NEGATIVE
HGB UR QL STRIP: ABNORMAL
KETONES UR QL STRIP: NEGATIVE
LEUKOCYTE ESTERASE UR QL STRIP: NEGATIVE
MICROSCOPIC COMMENT: ABNORMAL
NITRITE UR QL STRIP: NEGATIVE
NON-SQ EPI CELLS #/AREA URNS HPF: <1 /HPF
PH UR STRIP: 6 [PH] (ref 5–8)
PROT UR QL STRIP: NEGATIVE
RBC #/AREA URNS HPF: 3 /HPF (ref 0–4)
SP GR UR STRIP: 1.02 (ref 1–1.03)
SQUAMOUS #/AREA URNS HPF: 3 /HPF
URN SPEC COLLECT METH UR: ABNORMAL
WBC #/AREA URNS HPF: 2 /HPF (ref 0–5)

## 2018-04-04 PROCEDURE — 87086 URINE CULTURE/COLONY COUNT: CPT

## 2018-04-04 PROCEDURE — 81000 URINALYSIS NONAUTO W/SCOPE: CPT

## 2018-04-04 PROCEDURE — 3075F SYST BP GE 130 - 139MM HG: CPT | Mod: CPTII,S$GLB,, | Performed by: FAMILY MEDICINE

## 2018-04-04 PROCEDURE — 74019 RADEX ABDOMEN 2 VIEWS: CPT | Mod: 26,,, | Performed by: RADIOLOGY

## 2018-04-04 PROCEDURE — 3080F DIAST BP >= 90 MM HG: CPT | Mod: CPTII,S$GLB,, | Performed by: FAMILY MEDICINE

## 2018-04-04 PROCEDURE — 99999 PR PBB SHADOW E&M-EST. PATIENT-LVL IV: CPT | Mod: PBBFAC,,, | Performed by: FAMILY MEDICINE

## 2018-04-04 PROCEDURE — 99214 OFFICE O/P EST MOD 30 MIN: CPT | Mod: S$GLB,,, | Performed by: FAMILY MEDICINE

## 2018-04-04 PROCEDURE — 74019 RADEX ABDOMEN 2 VIEWS: CPT | Mod: TC

## 2018-04-04 RX ORDER — PHENAZOPYRIDINE HYDROCHLORIDE 100 MG/1
100 TABLET, FILM COATED ORAL 3 TIMES DAILY PRN
Qty: 15 TABLET | Refills: 0 | Status: SHIPPED | OUTPATIENT
Start: 2018-04-04 | End: 2018-04-14

## 2018-04-04 RX ORDER — CHLORTHALIDONE 25 MG/1
25 TABLET ORAL DAILY
Qty: 90 TABLET | Refills: 1 | Status: SHIPPED | OUTPATIENT
Start: 2018-04-04 | End: 2018-09-01 | Stop reason: SDUPTHER

## 2018-04-05 LAB — BACTERIA UR CULT: NO GROWTH

## 2018-04-17 ENCOUNTER — TELEPHONE (OUTPATIENT)
Dept: INTERNAL MEDICINE | Facility: CLINIC | Age: 53
End: 2018-04-17

## 2018-04-17 DIAGNOSIS — R73.01 IMPAIRED FASTING GLUCOSE: Primary | ICD-10-CM

## 2018-04-17 DIAGNOSIS — E78.5 HYPERLIPIDEMIA LDL GOAL <130: ICD-10-CM

## 2018-04-17 DIAGNOSIS — I10 HYPERTENSION GOAL BP (BLOOD PRESSURE) < 140/90: ICD-10-CM

## 2018-04-17 NOTE — TELEPHONE ENCOUNTER
I spoke with patient, I scheduled her appointment for CMP, Lipid Panel, and A1C on July 17, 2018 at 8:30 am.

## 2018-04-18 ENCOUNTER — CLINICAL SUPPORT (OUTPATIENT)
Dept: INTERNAL MEDICINE | Facility: CLINIC | Age: 53
End: 2018-04-18
Payer: COMMERCIAL

## 2018-04-18 VITALS — HEART RATE: 80 BPM | SYSTOLIC BLOOD PRESSURE: 138 MMHG | DIASTOLIC BLOOD PRESSURE: 78 MMHG

## 2018-04-19 NOTE — PROGRESS NOTES
B/P improved.   Continue current management.   Schedule f/u with Ramez Deras in 3 months following labs.

## 2018-05-09 ENCOUNTER — OFFICE VISIT (OUTPATIENT)
Dept: OBSTETRICS AND GYNECOLOGY | Facility: CLINIC | Age: 53
End: 2018-05-09
Payer: COMMERCIAL

## 2018-05-09 VITALS
WEIGHT: 236.75 LBS | DIASTOLIC BLOOD PRESSURE: 90 MMHG | BODY MASS INDEX: 39.45 KG/M2 | SYSTOLIC BLOOD PRESSURE: 145 MMHG | HEIGHT: 65 IN

## 2018-05-09 DIAGNOSIS — Z78.0 MENOPAUSE: Primary | ICD-10-CM

## 2018-05-09 PROCEDURE — 3077F SYST BP >= 140 MM HG: CPT | Mod: CPTII,S$GLB,, | Performed by: OBSTETRICS & GYNECOLOGY

## 2018-05-09 PROCEDURE — 3080F DIAST BP >= 90 MM HG: CPT | Mod: CPTII,S$GLB,, | Performed by: OBSTETRICS & GYNECOLOGY

## 2018-05-09 PROCEDURE — 99999 PR PBB SHADOW E&M-EST. PATIENT-LVL III: CPT | Mod: PBBFAC,,, | Performed by: OBSTETRICS & GYNECOLOGY

## 2018-05-09 PROCEDURE — 3008F BODY MASS INDEX DOCD: CPT | Mod: CPTII,S$GLB,, | Performed by: OBSTETRICS & GYNECOLOGY

## 2018-05-09 PROCEDURE — 99213 OFFICE O/P EST LOW 20 MIN: CPT | Mod: S$GLB,,, | Performed by: OBSTETRICS & GYNECOLOGY

## 2018-05-09 RX ORDER — MEDROXYPROGESTERONE ACETATE 2.5 MG/1
2.5 TABLET ORAL DAILY
Qty: 30 TABLET | Refills: 4 | Status: SHIPPED | OUTPATIENT
Start: 2018-05-09 | End: 2018-09-10

## 2018-05-09 RX ORDER — ESTRADIOL 1 MG/1
1 TABLET ORAL DAILY
Qty: 30 TABLET | Refills: 4 | Status: SHIPPED | OUTPATIENT
Start: 2018-05-09 | End: 2018-09-10

## 2018-05-09 NOTE — PATIENT INSTRUCTIONS
Hormone Therapy for Women    Hormone therapy (HT) increases the levels of the hormones estrogen and progesterone in your body. This can help reduce symptoms of menopause. HT may also help prevent osteoporosis in some women. But HT may increase risk for certain conditions, including blood clots, gallstones, heart disease, and stroke. However, HT may also reduce the risk of heart disease in some women.  How to take hormones  To get the best results, always take your hormones exactly as directed. Hormones can be taken in any of these ways:  · Pills containing estrogen, and sometimes other hormones, are taken as often as every day. This is the most common form of hormone therapy.  · A patch, spray, or gel releases estrogen into the bloodstream through the skin. There is also a patch that contains estrogen and progesterone. The patch can be worn on your hip. Most patches are changed once or twice a week.  · Vaginal ring containing estrogen.  · Cream used inside the vagina releases estrogen locally. Only a very small amount gets into the bloodstream. For this reason, vaginal creams can treat vaginal atrophy and dryness, but are not used to treat hot flashes. The creams do not significantly increase your risk for heart attack or stroke. However, if used more than twice a week in other than very small doses, estrogen does get into the bloodstream in significant amounts. This may affect the uterus if present.  · Prolonged exposure of estrogen in the blood without the use of a progestin increases the risk of cancer of the uterus.  Follow up visits  Have regular visits with a healthcare provider. These visits are a way to fine-tune your therapy. You can also be checked for any problems that might require you to stop HT.  Call your healthcare provider  If you have any of the following symptoms, call your healthcare provider:  · Unexpected vaginal bleeding  · A breast lump, or breast tenderness that doesnt go away  · Severe  headaches  · Aching muscles in your back or legs  · Sudden pain in your legs or chest  · Shortness of breath   Date Last Reviewed: 2/1/2017 © 2000-2017 [employer]. [address] [place] [address], [place] [address]. All rights reserved. This information is not intended as a substitute for professional medical care. Always follow your healthcare professional's instructions.        Understanding Menopause  Menopause marks the point where youve gone 12 months in a row without a period. The average age for this is around 51, but it can happen at younger or older ages. During the months or years before menopause, your body goes through many changes. It may be helpful to understand these changes and what you can do about the symptoms that result.     Use a portable fan to help stay cool.    Symptoms  Perimenopause is sometimes called the menopause transition. It happens in the months or years before menopause. It may begin when you reach your mid-40s. During this time, your estrogen levels go up and down and then decrease. As a result, you may notice some of the following symptoms:  · Menstrual periods that come more or less often than usual  · Menstrual periods that are lighter or heavier than normal  · Increased premenstrual syndrome (PMS) symptoms  · Hot flashes  · Night sweats  · Mood swings  · Vaginal dryness with possible painful sexual activity  · Difficulty going to sleep or staying asleep  · Decreased sexual drive and function  · Urinating frequently  It is important to remember that you could become pregnant until 12 months have passed since your last menstrual period. Ask your healthcare provider about birth control choices.   Controlling symptoms  Your healthcare provider may suggest pills or an intrauterine device (IUD) that contain the hormone progesterone. This can make your periods more regular and prevent excess bleeding. If you have symptoms due to lower estrogen levels, your healthcare  provider may suggest pills that contain estrogen and/or progesterone. This is called hormone therapy (HT).  There are also other prescription medicines that help control some of the bothersome symptoms, like hot flashes, mood swings, and vaginal dryness.  Other ways for you to deal with symptoms are listed below.  · Hot flashes. Wear layers that you can remove. Try all-cotton clothing, sheets, and blankets. Keep a glass of cold water by your bed.  · Pain during sex. You can buy a water-based lubricant or vaginal moisturizer in the drugstore that may help. Your healthcare provider may also prescribe an estrogen cream for your vagina.  · Mood swings. Talking to friends who are going through the same changes can sometimes help.  Date Last Reviewed: 12/1/2016  © 2935-6951 [employer]. [address] [place] [address], [place] [address]. All rights reserved. This information is not intended as a substitute for professional medical care. Always follow your healthcare professional's instructions.

## 2018-05-09 NOTE — PROGRESS NOTES
Subjective:       Patient ID: Leydi Quintero is a 52 y.o. female.    Chief Complaint:  Consult (hormone)      History of Present Illness  HPI  Pt complains of worsening hot flashes, moodiness, night sweats, and mood swings.  Pt would like to discuss HRT.    GYN & OB History  Patient's last menstrual period was 2013.   Date of Last Pap: 2016    OB History    Para Term  AB Living   2 1 1   1 1   SAB TAB Ectopic Multiple Live Births   1              # Outcome Date GA Lbr Kem/2nd Weight Sex Delivery Anes PTL Lv   2 SAB            1 Term      Vag-Spont             Review of Systems  Review of Systems   Constitutional: Positive for unexpected weight change. Negative for activity change, appetite change, fatigue and fever.   Respiratory: Negative for shortness of breath.    Cardiovascular: Negative for chest pain.   Gastrointestinal: Negative for abdominal pain, bloating, blood in stool, constipation, diarrhea, nausea and vomiting.   Endocrine: Positive for hot flashes.   Genitourinary: Negative for dysuria, flank pain, frequency, genital sores, hematuria, pelvic pain, vaginal bleeding, vaginal discharge, vaginal pain, urinary incontinence, postmenopausal bleeding and vaginal odor.   Musculoskeletal: Negative for back pain.   Neurological: Negative for syncope and headaches.   Breast: Negative for breast mass, breast pain, nipple discharge and skin changes          Objective:    Physical Exam:   Constitutional: She is oriented to person, place, and time. She appears well-developed and well-nourished. No distress.                           Neurological: She is alert and oriented to person, place, and time.     Psychiatric: She has a normal mood and affect. Her behavior is normal. Thought content normal.          Assessment:        1. Menopause             Plan:      Menopause  -     estradiol (ESTRACE) 1 MG tablet; Take 1 tablet (1 mg total) by mouth once daily.  Dispense: 30 tablet;  Refill: 4  -     medroxyPROGESTERone (PROVERA) 2.5 MG tablet; Take 1 tablet (2.5 mg total) by mouth once daily.  Dispense: 30 tablet; Refill: 4  -     Pt was counseled on treatment options, including associated risks and benefits of each.  Pt voiced understanding and desires to proceed with HRT.  Medication dosing, side-effects, risks, benefits, and alternatives were discussed.  Medical history was reviewed and pt is a candidate for HRT use.  Pt normally sees Dr. Ralph and plans on following up with Dr. Ralph when she sees her for her annual exam in 08/2018.      Follow-up in about 4 months (around 9/9/2018) for Annual exam.

## 2018-06-14 ENCOUNTER — HOSPITAL ENCOUNTER (OUTPATIENT)
Dept: RADIOLOGY | Facility: HOSPITAL | Age: 53
Discharge: HOME | End: 2018-06-14
Attending: PODIATRIST
Payer: COMMERCIAL

## 2018-06-14 ENCOUNTER — OFFICE VISIT (OUTPATIENT)
Dept: PODIATRY | Facility: CLINIC | Age: 53
End: 2018-06-14
Payer: COMMERCIAL

## 2018-06-14 VITALS
HEIGHT: 65 IN | WEIGHT: 235.88 LBS | SYSTOLIC BLOOD PRESSURE: 136 MMHG | HEART RATE: 78 BPM | DIASTOLIC BLOOD PRESSURE: 88 MMHG | BODY MASS INDEX: 39.3 KG/M2

## 2018-06-14 DIAGNOSIS — E66.9 OBESITY (BMI 30-39.9): ICD-10-CM

## 2018-06-14 DIAGNOSIS — M21.42 PES PLANUS OF BOTH FEET: ICD-10-CM

## 2018-06-14 DIAGNOSIS — M21.41 PES PLANUS OF BOTH FEET: Primary | ICD-10-CM

## 2018-06-14 DIAGNOSIS — M21.41 PES PLANUS OF BOTH FEET: ICD-10-CM

## 2018-06-14 DIAGNOSIS — M21.42 PES PLANUS OF BOTH FEET: Primary | ICD-10-CM

## 2018-06-14 PROCEDURE — 3008F BODY MASS INDEX DOCD: CPT | Mod: CPTII,S$GLB,, | Performed by: PODIATRIST

## 2018-06-14 PROCEDURE — 99999 PR PBB SHADOW E&M-EST. PATIENT-LVL III: CPT | Mod: PBBFAC,,, | Performed by: PODIATRIST

## 2018-06-14 PROCEDURE — 73630 X-RAY EXAM OF FOOT: CPT | Mod: 50,TC,FY,PO

## 2018-06-14 PROCEDURE — 3079F DIAST BP 80-89 MM HG: CPT | Mod: CPTII,S$GLB,, | Performed by: PODIATRIST

## 2018-06-14 PROCEDURE — 73630 X-RAY EXAM OF FOOT: CPT | Mod: 26,50,, | Performed by: RADIOLOGY

## 2018-06-14 PROCEDURE — 3075F SYST BP GE 130 - 139MM HG: CPT | Mod: CPTII,S$GLB,, | Performed by: PODIATRIST

## 2018-06-14 PROCEDURE — 99203 OFFICE O/P NEW LOW 30 MIN: CPT | Mod: S$GLB,,, | Performed by: PODIATRIST

## 2018-06-14 NOTE — PROGRESS NOTES
Subjective:     Patient ID: Leydi Quintero is a 52 y.o. female.    Chief Complaint: Foot Pain (bilateral foot pain, current pain rated at a 4, pain was described as a muscle ache)    Leydi is a 52 y.o. female who presents to the podiatry clinic  with complaint of  bilateral foot pain. Onset of the symptoms was several years ago. Precipitating event: none known. Current symptoms include: ability to bear weight, but with some pain. Aggravating factors: any weight bearing. Symptoms have been intermittent. Patient has had prior foot problems. Evaluation to date: none. Treatment to date: none. Patients rates pain 4/10 on pain scale. Patient states she broke right ankle about 2 years ago. Patient also states she had surgery when she was 10 for fallen arches. Patient has no other pedal complaints at this time.        Patient Active Problem List   Diagnosis    Hyperlipidemia LDL goal <130    Hypertension goal BP (blood pressure) < 140/90    Obesity (BMI 30-39.9)       Medication List with Changes/Refills   Current Medications    ALBUTEROL (ACCUNEB) 0.63 MG/3 ML NEBU    Take 3 mLs (0.63 mg total) by nebulization every 6 (six) hours as needed. Rescue    CHLORTHALIDONE (HYGROTEN) 25 MG TAB    Take 1 tablet (25 mg total) by mouth once daily.    ESTRADIOL (ESTRACE) 1 MG TABLET    Take 1 tablet (1 mg total) by mouth once daily.    FLUTICASONE (FLONASE) 50 MCG/ACTUATION NASAL SPRAY    1 spray by Each Nare route 2 (two) times daily.    MEDROXYPROGESTERONE (PROVERA) 2.5 MG TABLET    Take 1 tablet (2.5 mg total) by mouth once daily.    PRAVASTATIN (PRAVACHOL) 80 MG TABLET    TAKE 1 TABLET(80 MG) BY MOUTH EVERY EVENING       Review of patient's allergies indicates:   Allergen Reactions    Lipitor [atorvastatin]      Other reaction(s): Muscle pain       Past Surgical History:   Procedure Laterality Date    FOOT SURGERY  at 11y/o    arch lift       Family History   Problem Relation Age of Onset    Diabetes Mother      "Hypertension Mother     Hypertension Father     Hyperlipidemia Father     Breast cancer Neg Hx     Colon cancer Neg Hx     Ovarian cancer Neg Hx        Social History     Social History    Marital status:      Spouse name: N/A    Number of children: 1    Years of education: N/A     Occupational History     [employer]     Social History Main Topics    Smoking status: Never Smoker    Smokeless tobacco: Never Used    Alcohol use No    Drug use: No    Sexual activity: Yes     Partners: Male     Birth control/ protection: Post-menopausal     Other Topics Concern    Not on file     Social History Narrative    No narrative on file       Vitals:    06/14/18 1532   BP: 136/88   Pulse: 78   Weight: 107 kg (235 lb 14.3 oz)   Height: 5' 5" (1.651 m)   PainSc:   4       Hemoglobin A1C   Date Value Ref Range Status   04/04/2018 5.9 (H) 4.0 - 5.6 % Final     Comment:     According to ADA guidelines, hemoglobin A1c <7.0% represents  optimal control in non-pregnant diabetic patients. Different  metrics may apply to specific patient populations.   Standards of Medical Care in Diabetes-2016.  For the purpose of screening for the presence of diabetes:  <5.7%     Consistent with the absence of diabetes  5.7-6.4%  Consistent with increasing risk for diabetes   (prediabetes)  >or=6.5%  Consistent with diabetes  Currently, no consensus exists for use of hemoglobin A1c  for diagnosis of diabetes for children.  This Hemoglobin A1c assay has significant interference with fetal   hemoglobin   (HbF). The results are invalid for patients with abnormal amounts of   HbF,   including those with known Hereditary Persistence   of Fetal Hemoglobin. Heterozygous hemoglobin variants (HbAS, HbAC,   HbAD, HbAE, HbA2) do not significantly interfere with this assay;   however, presence of multiple variants in a sample may impact the %   interference.     01/13/2009 5.8 4.0 - 6.2 % Final       Review of Systems "   Constitutional: Negative for chills and fever.   Respiratory: Negative for shortness of breath.    Cardiovascular: Negative for chest pain, palpitations, orthopnea, claudication and leg swelling.   Gastrointestinal: Negative for diarrhea, nausea and vomiting.   Musculoskeletal: Negative for joint pain.   Skin: Negative for rash.   Neurological: Negative for dizziness, tingling, sensory change, focal weakness and weakness.   Psychiatric/Behavioral: Negative.              Objective:      PHYSICAL EXAM: Apperance: Alert and orient in no distress,well developed, and with good attention to grooming and body habits  Patient presents ambulating in flats  Lower Extremity Physical Exam:  VASCULAR: Dorsalis pedis pulses 2/4 bilateral and Posterior Tibial pulses 2/4 bilateral. Capillary fill time <4 seconds bilateral. Mild edema observed bilateral . Varicosities absent bilateral. Skin temperature of the lower extremities is warm to warm, proximal to distal. Hair growth WNLbilateral.  DERMATOLOGICAL: No skin rashes, subcutaneous nodules, lesions, or ulcers observed bilateral.   NEUROLOGICAL: Light touch, sharp-dull, proprioception all present and equal bilaterally.   MUSCULOSKELETAL:Muscle strength is 5/5 for foot inverters, everters, plantarflexors, and dorsiflexors. Muscle tone is normal. Ankle joint ROM diminished  with no pain or crepitus bilateral ankle joints. Ankle joints bilateral demonstrate no evidence of subluxation, dislocation or laxity.  STJ bilateral demonstrates hypermobile ROM with no pain or crepitus. STJ bilateral demonstrates no evidence of subluxation, dislocation or laxity.  MTJ ROM bilateral is hypermobile, pain free and without crepitus.  MTJ bilateral demonstrates no evidence of subluxation, dislocation or laxity. Pain to palpation bilateral medial foot at arch. Medial aspect of bilateral foot shows tenderness to palpation. No pain on along posterior tibial tendon. The general morphology of the foot  is decreased arch height off weight bearing bilateral. - Hubscher maneuver bilateral.        Assessment:       Encounter Diagnoses   Name Primary?    Pes planus of both feet Yes    Obesity (BMI 30-39.9)          Plan:   Pes planus of both feet  -     X-Ray Foot Complete Bilateral; Future; Expected date: 06/14/2018    Obesity (BMI 30-39.9)      I counseled the patient on her conditions, regarding findings of my examination, my impressions, and usual treatment plan.   Ordered bilateral foot x-rays to be completed today.   I explained to the patient that etiology and treatment options for arch pain including ice message, new shoegear, orthotic inserts, NSAIDs, rest, strappings and tapings, stretching/strengthening including number and amounts of time each application.    Patient shown proper execution of stretching /strengthening exercise and instructed on correct number and amounts of time each stretch.    The patient and I reviewed the types of shoes she should be wearing, my recommendation includes generally the best time of the day for a shoe fitting is the afternoon, shoes with a wide toe box, very good cushion, and tennis shoes with removable inner soles.  The patient and I reviewed my recommendations for over-the-counter orthotic inserts. Patient instructed to try over-the-counter orthotics before custom orthotics.   Patient to return if pain worsens or persists.           Lety Downey DPM  [facility]

## 2018-06-20 DIAGNOSIS — S92.355A NONDISPLACED FRACTURE OF FIFTH METATARSAL BONE, LEFT FOOT, INITIAL ENCOUNTER FOR CLOSED FRACTURE: Primary | ICD-10-CM

## 2018-07-20 ENCOUNTER — LAB VISIT (OUTPATIENT)
Dept: LAB | Facility: HOSPITAL | Age: 53
End: 2018-07-20
Attending: INTERNAL MEDICINE
Payer: COMMERCIAL

## 2018-07-20 DIAGNOSIS — R73.01 IMPAIRED FASTING GLUCOSE: ICD-10-CM

## 2018-07-20 DIAGNOSIS — E78.5 HYPERLIPIDEMIA LDL GOAL <130: ICD-10-CM

## 2018-07-20 DIAGNOSIS — I10 HYPERTENSION GOAL BP (BLOOD PRESSURE) < 140/90: ICD-10-CM

## 2018-07-20 LAB
ALBUMIN SERPL BCP-MCNC: 3.5 G/DL
ALP SERPL-CCNC: 78 U/L
ALT SERPL W/O P-5'-P-CCNC: 13 U/L
ANION GAP SERPL CALC-SCNC: 12 MMOL/L
AST SERPL-CCNC: 13 U/L
BILIRUB SERPL-MCNC: 0.4 MG/DL
BUN SERPL-MCNC: 16 MG/DL
CALCIUM SERPL-MCNC: 9.8 MG/DL
CHLORIDE SERPL-SCNC: 103 MMOL/L
CHOLEST SERPL-MCNC: 304 MG/DL
CHOLEST/HDLC SERPL: 7.8 {RATIO}
CO2 SERPL-SCNC: 26 MMOL/L
CREAT SERPL-MCNC: 1 MG/DL
EST. GFR  (AFRICAN AMERICAN): >60 ML/MIN/1.73 M^2
EST. GFR  (NON AFRICAN AMERICAN): >60 ML/MIN/1.73 M^2
ESTIMATED AVG GLUCOSE: 140 MG/DL
GLUCOSE SERPL-MCNC: 136 MG/DL
HBA1C MFR BLD HPLC: 6.5 %
HDLC SERPL-MCNC: 39 MG/DL
HDLC SERPL: 12.8 %
LDLC SERPL CALC-MCNC: 212.2 MG/DL
NONHDLC SERPL-MCNC: 265 MG/DL
POTASSIUM SERPL-SCNC: 3.3 MMOL/L
PROT SERPL-MCNC: 7.1 G/DL
SODIUM SERPL-SCNC: 141 MMOL/L
TRIGL SERPL-MCNC: 264 MG/DL

## 2018-07-20 PROCEDURE — 36415 COLL VENOUS BLD VENIPUNCTURE: CPT

## 2018-07-20 PROCEDURE — 80053 COMPREHEN METABOLIC PANEL: CPT

## 2018-07-20 PROCEDURE — 83036 HEMOGLOBIN GLYCOSYLATED A1C: CPT

## 2018-07-20 PROCEDURE — 80061 LIPID PANEL: CPT

## 2018-07-27 ENCOUNTER — PATIENT MESSAGE (OUTPATIENT)
Dept: INTERNAL MEDICINE | Facility: CLINIC | Age: 53
End: 2018-07-27

## 2018-07-27 NOTE — TELEPHONE ENCOUNTER
Test Results     Leydi Chavez, DO 1 hour ago (8:10 AM)         Good morning Dr. Chavez,     Just reviewed my test results for my blood work.  Do I need to schedule an appointment or is everything ok for now?  Please advise.  Thanks

## 2018-08-02 ENCOUNTER — OFFICE VISIT (OUTPATIENT)
Dept: INTERNAL MEDICINE | Facility: CLINIC | Age: 53
End: 2018-08-02
Payer: COMMERCIAL

## 2018-08-02 VITALS
WEIGHT: 234.81 LBS | TEMPERATURE: 98 F | HEIGHT: 65 IN | OXYGEN SATURATION: 100 % | HEART RATE: 64 BPM | DIASTOLIC BLOOD PRESSURE: 84 MMHG | SYSTOLIC BLOOD PRESSURE: 128 MMHG | BODY MASS INDEX: 39.12 KG/M2

## 2018-08-02 DIAGNOSIS — E78.5 HYPERLIPIDEMIA LDL GOAL <70: Chronic | ICD-10-CM

## 2018-08-02 DIAGNOSIS — T50.2X5A DIURETIC-INDUCED HYPOKALEMIA: ICD-10-CM

## 2018-08-02 DIAGNOSIS — I10 HYPERTENSION GOAL BP (BLOOD PRESSURE) < 130/80: Chronic | ICD-10-CM

## 2018-08-02 DIAGNOSIS — E11.65 TYPE 2 DIABETES MELLITUS WITH HYPERGLYCEMIA, WITHOUT LONG-TERM CURRENT USE OF INSULIN: Primary | ICD-10-CM

## 2018-08-02 DIAGNOSIS — E87.6 DIURETIC-INDUCED HYPOKALEMIA: ICD-10-CM

## 2018-08-02 DIAGNOSIS — Z12.39 BREAST CANCER SCREENING: ICD-10-CM

## 2018-08-02 PROCEDURE — 3008F BODY MASS INDEX DOCD: CPT | Mod: CPTII,S$GLB,, | Performed by: INTERNAL MEDICINE

## 2018-08-02 PROCEDURE — 3074F SYST BP LT 130 MM HG: CPT | Mod: CPTII,S$GLB,, | Performed by: INTERNAL MEDICINE

## 2018-08-02 PROCEDURE — 99214 OFFICE O/P EST MOD 30 MIN: CPT | Mod: S$GLB,,, | Performed by: INTERNAL MEDICINE

## 2018-08-02 PROCEDURE — 3044F HG A1C LEVEL LT 7.0%: CPT | Mod: CPTII,S$GLB,, | Performed by: INTERNAL MEDICINE

## 2018-08-02 PROCEDURE — 3079F DIAST BP 80-89 MM HG: CPT | Mod: CPTII,S$GLB,, | Performed by: INTERNAL MEDICINE

## 2018-08-02 PROCEDURE — 99999 PR PBB SHADOW E&M-EST. PATIENT-LVL IV: CPT | Mod: PBBFAC,,, | Performed by: INTERNAL MEDICINE

## 2018-08-02 RX ORDER — LANCETS
EACH MISCELLANEOUS
Qty: 50 EACH | Refills: 6 | Status: SHIPPED | OUTPATIENT
Start: 2018-08-02 | End: 2019-12-06 | Stop reason: SDUPTHER

## 2018-08-02 RX ORDER — METFORMIN HYDROCHLORIDE 500 MG/1
500 TABLET, EXTENDED RELEASE ORAL
Qty: 30 TABLET | Refills: 3 | Status: SHIPPED | OUTPATIENT
Start: 2018-08-02 | End: 2018-11-08 | Stop reason: SDUPTHER

## 2018-08-02 RX ORDER — DEXTROSE 4 G
TABLET,CHEWABLE ORAL
Qty: 1 EACH | Refills: 0 | Status: SHIPPED | OUTPATIENT
Start: 2018-08-02 | End: 2019-07-16 | Stop reason: SDUPTHER

## 2018-08-02 NOTE — PATIENT INSTRUCTIONS
Metformin tablets  What is this medicine?  METFORMIN (met FOR min) is used to treat type 2 diabetes. It helps to control blood sugar. Treatment is combined with diet and exercise. This medicine can be used alone or with other medicines for diabetes.  How should I use this medicine?  Take this medicine by mouth. Take it with meals. Swallow the tablets with a drink of water. Follow the directions on the prescription label. Take your medicine at regular intervals. Do not take your medicine more often than directed.  Talk to your pediatrician regarding the use of this medicine in children. While this drug may be prescribed for children as young as 10 years of age for selected conditions, precautions do apply.  What side effects may I notice from receiving this medicine?  Side effects that you should report to your doctor or health care professional as soon as possible:  · allergic reactions like skin rash, itching or hives, swelling of the face, lips, or tongue  · breathing problems  · feeling faint or lightheaded, falls  · muscle aches or pains  · signs and symptoms of low blood sugar such as feeling anxious, confusion, dizziness, increased hunger, unusually weak or tired, sweating, shakiness, cold, irritable, headache, blurred vision, fast heartbeat, loss of consciousness  · slow or irregular heartbeat  · unusual stomach pain or discomfort  · unusually tired or weak  Side effects that usually do not require medical attention (report to your doctor or health care professional if they continue or are bothersome):  · diarrhea  · headache  · heartburn  · metallic taste in mouth  · nausea  · stomach gas, upset  What may interact with this medicine?  Do not take this medicine with any of the following medications:  · dofetilide  · gatifloxacin  · certain contrast medicines given before X-rays, CT scans, MRI, or other procedures  This medicine may also interact with the following medications:  · acetazolamide  · certain  medicines for HIV infection or hepatitis, like adefovir, emtricitabine, entecavir, lamivudine, or tenofovir  · cimetidine  · crizotinib  · digoxin  · diuretics  · female hormones, like estrogens or progestins and birth control pills  · glycopyrrolate  · isoniazid  · lamotrigine  · medicines for blood pressure, heart disease, irregular heart beat  · memantine  · midodrine  · methazolamide  · morphine  · nicotinic acid  · phenothiazines like chlorpromazine, mesoridazine, prochlorperazine, thioridazine  · phenytoin  · procainamide  · propantheline  · quinidine  · quinine  · ranitidine  · ranolazine  · steroid medicines like prednisone or cortisone  · stimulant medicines for attention disorders, weight loss, or to stay awake  · thyroid medicines  · topiramate  · trimethoprim  · trospium  · vancomycin  · vandetanib  · zonisamide  What if I miss a dose?  If you miss a dose, take it as soon as you can. If it is almost time for your next dose, take only that dose. Do not take double or extra doses.  Where should I keep my medicine?  Keep out of the reach of children.  Store at room temperature between 15 and 30 degrees C (59 and 86 degrees F). Protect from moisture and light. Throw away any unused medicine after the expiration date.  What should I tell my health care provider before I take this medicine?  They need to know if you have any of these conditions:  · anemia  · frequently drink alcohol-containing beverages  · become easily dehydrated  · heart attack  · heart failure that is treated with medications  · kidney disease  · liver disease  · polycystic ovary syndrome  · serious infection or injury  · vomiting  · an unusual or allergic reaction to metformin, other medicines, foods, dyes, or preservatives  · pregnant or trying to get pregnant  · breast-feeding  What should I watch for while using this medicine?  Visit your doctor or health care professional for regular checks on your progress.  A test called the HbA1C  (A1C) will be monitored. This is a simple blood test. It measures your blood sugar control over the last 2 to 3 months. You will receive this test every 3 to 6 months.  Learn how to check your blood sugar. Learn the symptoms of low and high blood sugar and how to manage them.  Always carry a quick-source of sugar with you in case you have symptoms of low blood sugar. Examples include hard sugar candy or glucose tablets. Make sure others know that you can choke if you eat or drink when you develop serious symptoms of low blood sugar, such as seizures or unconsciousness. They must get medical help at once.  Tell your doctor or health care professional if you have high blood sugar. You might need to change the dose of your medicine. If you are sick or exercising more than usual, you might need to change the dose of your medicine.  Do not skip meals. Ask your doctor or health care professional if you should avoid alcohol. Many nonprescription cough and cold products contain sugar or alcohol. These can affect blood sugar.  This medicine may cause ovulation in premenopausal women who do not have regular monthly periods. This may increase your chances of becoming pregnant. You should not take this medicine if you become pregnant or think you may be pregnant. Talk with your doctor or health care professional about your birth control options while taking this medicine. Contact your doctor or health care professional right away if think you are pregnant.  If you are going to need surgery, a MRI, CT scan, or other procedure, tell your doctor that you are taking this medicine. You may need to stop taking this medicine before the procedure.  Wear a medical ID bracelet or chain, and carry a card that describes your disease and details of your medicine and dosage times.  NOTE:This sheet is a summary. It may not cover all possible information. If you have questions about this medicine, talk to your doctor, pharmacist, or health  care provider. Copyright© 2017 [employer]        Potassium-Rich Foods  The normal adult diet usually contains 2,000 mg to 4,000 mg of potassium per day. More potassium is needed when you lose too much potassium from your body. This can happen if you have diarrhea or vomiting. It can also happen if you take a medicine to make you urinate more (diuretic). To increase the amount of potassium in your diet, include these high-potassium foods.     [The (*) indicates foods highest in potassium.]  Vegetables  Artichokes. Cooked 1/2 cup, 200 mg to 300 mg*  Asparagus. Cooked 1/2 cup, 200 mg to 300 mg  Beans. White, red, griffin cooked 1/2 cup, 300 mg to 500 mg*  Beets. Cooked 1/2 cup, 200 mg to 300 mg  Broccoli. Cooked or raw 1 cup, 200 mg to 500 mg*  [place] sprouts. Cooked 1/2 cup, 200 mg to 300 mg  Cabbage. Raw 1 cup, 100 mg to 200 mg  Carrots. Raw or cooked 1/2 cup, 100 mg to 200 mg  Celery. Raw 1 cup, 200 mg to 300 mg  Lima beans. Fresh or frozen 1/2 cup, 300 mg to 500 mg*   Mushrooms. Raw or cooked 1/2 cup, 100 mg to 300 mg  Peas. Cooked 1/2 cup, 150 mg to 250 mg   Potatoes. Baked 1 medium, 500 mg to 900 mg*   Spinach. Cooked 1 cup, 800 mg to 900 mg*   Spinach. Raw 2 cups, 300 mg to 400 mg *  Squash, winter. Fresh, frozen, or cooked 1/2 cup, 200 mg to 400 mg   Tomato. Fresh 1 medium, 200 mg to 300 mg   Tomato juice. Canned 1/2 cup, 200 mg to 300 mg   Fruits  Apple juice. Unsweetened 1 cup, 200 mg to 300 mg   Apricots. Canned 1/2 cup, 200 mg to 300 mg   Apricots. Dried 4 pieces, 100 mg to 200 mg   Avocado. Raw 1/2 cup, 300 mg to 400 mg*  Banana. Fresh 1 small, 300 mg to 400 mg*   Cantaloupe. Fresh 1 cup diced, 300 mg to 400 mg*   Grape juice. Unsweetened 1 cup, 200 mg to 300 mg   Honeydew melon. Fresh 1 cup diced, 300 mg to 400 mg*   Orange. Fresh 1 medium, 200 mg to 300 mg    Orange juice. Unsweetened, fresh or frozen 1/2 cup, 200 mg to 300 mg  Pineapple juice. Unsweetened 1 cup, 300 mg to 400 mg   Prune juice.  Unsweetened 1/2 cup, 300 mg to 400 mg*   Prunes. Dried 5 pieces, 300 mg to 400 mg*   Strawberries. Fresh or frozen 1 cup, 200 mg to 300 mg  Meat  Red meat. Cooked 3 ounces, 100 mg to 300 mg   Seafood  Cod, flounder, halibut. Cooked 3 ounces, 100 mg to 300 mg*  [place]. Cooked, 3 ounces 300 mg to 400 mg*   Scallops. Cooked 3 ounces, 200 mg to 300 mg*  Shrimp. Cooked 3/4 cup, 100 mg to 200 mg   Tuna. Fresh or canned 3/4 cup, 200 mg to 500 mg   Date Last Reviewed: 10/1/2016  © 8170-4648 [employer]. [address] [place] [address], [place] [address]. All rights reserved. This information is not intended as a substitute for professional medical care. Always follow your healthcare professional's instructions.

## 2018-08-02 NOTE — PROGRESS NOTES
Subjective:       Patient ID: Leydi Quintero is a 52 y.o. female.    Chief Complaint: Follow-up    Leydi Quintero  52 y.o. Black or  female    Patient presents with:  Follow-up    HPI: Here today to follow up on chronic conditions.  She has a history of elevated glucoses and recent labs showed both elevated glucose (136) and an A1C of 6.5. She is having blurred vision and polyuria. She admits to diet and exercise non compliance. Her siblings have diabetes as well.   HLD--she has not been compliant with pravastatin, diet or exercise.                    CHOL                     304 (H)             07/20/2018                 HDL                      39 (L)              07/20/2018                 LDLCALC                  212.2 (H)           07/20/2018                 TRIG                     264 (H)             07/20/2018            HTN--stable on chlorthalidone. Her potassium is slightly low (3.3).         Past Medical History:  Allergic rhinitis  Anxiety  Asthma  Hyperlipidemia  Hypertension  Sarcoidosis    Current Outpatient Prescriptions on File Prior to Visit:  albuterol (ACCUNEB) 0.63 mg/3 mL Nebu, Take 3 mLs (0.63 mg total) by nebulization every 6 (six) hours as needed. Rescue, Disp: 300 mL, Rfl: 3  chlorthalidone (HYGROTEN) 25 MG Tab, Take 1 tablet (25 mg total) by mouth once daily., Disp: 90 tablet, Rfl: 1  estradiol (ESTRACE) 1 MG tablet, Take 1 tablet (1 mg total) by mouth once daily., Disp: 30 tablet, Rfl: 4  fluticasone (FLONASE) 50 mcg/actuation nasal spray, 1 spray by Each Nare route 2 (two) times daily., Disp: 16 g, Rfl: 3  medroxyPROGESTERone (PROVERA) 2.5 MG tablet, Take 1 tablet (2.5 mg total) by mouth once daily., Disp: 30 tablet, Rfl: 4  pravastatin (PRAVACHOL) 80 MG tablet, TAKE 1 TABLET(80 MG) BY MOUTH EVERY EVENING, Disp: 30 tablet, Rfl: 3    Allergies:  Review of patient's allergies indicates:   -- Lipitor (atorvastatin)     --  Other reaction(s): Muscle  pain          Review of Systems   Constitutional: Negative for fever and unexpected weight change.   Eyes: Positive for visual disturbance.   Respiratory: Negative for shortness of breath.    Cardiovascular: Negative for chest pain and leg swelling.   Gastrointestinal: Negative for abdominal pain, constipation and diarrhea.   Endocrine: Positive for polyuria.   Genitourinary: Positive for frequency.   Musculoskeletal: Negative for gait problem.   Neurological: Positive for numbness (on occasion). Negative for dizziness and headaches.   Psychiatric/Behavioral: Positive for sleep disturbance.       Objective:      Physical Exam   Constitutional: She is oriented to person, place, and time. She appears well-developed and well-nourished. No distress.   Eyes: No scleral icterus.   Neck: No tracheal deviation present.   Cardiovascular: Normal rate, regular rhythm and normal heart sounds.    Pulmonary/Chest: Effort normal and breath sounds normal. No respiratory distress.   Abdominal: Soft. Bowel sounds are normal.   Neurological: She is alert and oriented to person, place, and time.   Skin: Skin is warm and dry.   Psychiatric: She has a normal mood and affect.   Vitals reviewed.      Assessment:       1. Type 2 diabetes mellitus with hyperglycemia, without long-term current use of insulin    2. Hypertension goal BP (blood pressure) < 130/80    3. Hyperlipidemia LDL goal <70    4. Breast cancer screening        Plan:       Leydi was seen today for follow-up.    Diagnoses and all orders for this visit:    Type 2 diabetes mellitus with hyperglycemia, without long-term current use of insulin  -     Discussed diabetes management and complications in detail  -     Ambulatory consult to Nutrition Services  -     metFORMIN (GLUCOPHAGE-XR) 500 MG 24 hr tablet; Take 1 tablet (500 mg total) by mouth daily with breakfast. Discussed medication risks and benefits.   -     blood-glucose meter Misc; Use as directed.  -     blood sugar  diagnostic Strp; Once daily glucose testing.  -     lancets (LANCETS,THIN) Misc; Once daily glucose testing.    Hyperlipidemia LDL goal <70  -     Counseled regarding compliance with diet, exercise and pravastatin    Hypertension goal BP (blood pressure) < 130/80  -     Continue chlorthalidone    Diuretic-induced hypokalemia  -     Advised to increase the intake of potassium rich food in the diet. Handout provided.     Breast cancer screening  -     Mammo Digital Screening Bilateral With CAD; Future    Labs and F/U in 3 months.

## 2018-09-01 DIAGNOSIS — I10 HYPERTENSION GOAL BP (BLOOD PRESSURE) < 140/90: ICD-10-CM

## 2018-09-01 RX ORDER — CHLORTHALIDONE 25 MG/1
TABLET ORAL
Qty: 90 TABLET | Refills: 0 | OUTPATIENT
Start: 2018-09-01

## 2018-09-01 RX ORDER — CHLORTHALIDONE 25 MG/1
TABLET ORAL
Qty: 90 TABLET | Refills: 0 | Status: SHIPPED | OUTPATIENT
Start: 2018-09-01 | End: 2018-11-08 | Stop reason: SDUPTHER

## 2018-09-10 ENCOUNTER — CLINICAL SUPPORT (OUTPATIENT)
Dept: DIABETES | Facility: CLINIC | Age: 53
End: 2018-09-10
Payer: COMMERCIAL

## 2018-09-10 ENCOUNTER — HOSPITAL ENCOUNTER (OUTPATIENT)
Dept: RADIOLOGY | Facility: HOSPITAL | Age: 53
Discharge: HOME | End: 2018-09-10
Attending: INTERNAL MEDICINE
Payer: COMMERCIAL

## 2018-09-10 ENCOUNTER — OFFICE VISIT (OUTPATIENT)
Dept: OBSTETRICS AND GYNECOLOGY | Facility: CLINIC | Age: 53
End: 2018-09-10
Payer: COMMERCIAL

## 2018-09-10 ENCOUNTER — TELEPHONE (OUTPATIENT)
Dept: OBSTETRICS AND GYNECOLOGY | Facility: CLINIC | Age: 53
End: 2018-09-10

## 2018-09-10 VITALS — BODY MASS INDEX: 39.27 KG/M2 | WEIGHT: 235.69 LBS | HEIGHT: 65 IN

## 2018-09-10 VITALS
DIASTOLIC BLOOD PRESSURE: 90 MMHG | HEIGHT: 65 IN | WEIGHT: 237.88 LBS | BODY MASS INDEX: 39.63 KG/M2 | SYSTOLIC BLOOD PRESSURE: 136 MMHG

## 2018-09-10 DIAGNOSIS — Z01.419 WELL WOMAN EXAM: Primary | ICD-10-CM

## 2018-09-10 DIAGNOSIS — Z12.39 BREAST CANCER SCREENING: ICD-10-CM

## 2018-09-10 DIAGNOSIS — E11.65 TYPE 2 DIABETES MELLITUS WITH HYPERGLYCEMIA, WITHOUT LONG-TERM CURRENT USE OF INSULIN: Primary | ICD-10-CM

## 2018-09-10 PROCEDURE — 77067 SCR MAMMO BI INCL CAD: CPT | Mod: 26,,, | Performed by: RADIOLOGY

## 2018-09-10 PROCEDURE — G0108 DIAB MANAGE TRN  PER INDIV: HCPCS | Mod: S$GLB,,, | Performed by: DIETITIAN, REGISTERED

## 2018-09-10 PROCEDURE — 77063 BREAST TOMOSYNTHESIS BI: CPT | Mod: 26,,, | Performed by: RADIOLOGY

## 2018-09-10 PROCEDURE — 77067 SCR MAMMO BI INCL CAD: CPT | Mod: TC

## 2018-09-10 PROCEDURE — 99999 PR PBB SHADOW E&M-EST. PATIENT-LVL II: CPT | Mod: PBBFAC,,, | Performed by: DIETITIAN, REGISTERED

## 2018-09-10 PROCEDURE — 3075F SYST BP GE 130 - 139MM HG: CPT | Mod: CPTII,S$GLB,, | Performed by: OBSTETRICS & GYNECOLOGY

## 2018-09-10 PROCEDURE — 3080F DIAST BP >= 90 MM HG: CPT | Mod: CPTII,S$GLB,, | Performed by: OBSTETRICS & GYNECOLOGY

## 2018-09-10 PROCEDURE — 99396 PREV VISIT EST AGE 40-64: CPT | Mod: S$GLB,,, | Performed by: OBSTETRICS & GYNECOLOGY

## 2018-09-10 PROCEDURE — 99999 PR PBB SHADOW E&M-EST. PATIENT-LVL II: CPT | Mod: PBBFAC,,, | Performed by: OBSTETRICS & GYNECOLOGY

## 2018-09-10 RX ORDER — FLUCONAZOLE 150 MG/1
150 TABLET ORAL DAILY
Qty: 1 TABLET | Refills: 1 | Status: SHIPPED | OUTPATIENT
Start: 2018-09-10 | End: 2018-09-11

## 2018-09-10 RX ORDER — FLUCONAZOLE 100 MG/1
150 TABLET ORAL ONCE
Qty: 1 TABLET | Refills: 1 | Status: SHIPPED | OUTPATIENT
Start: 2018-09-10 | End: 2018-09-10

## 2018-09-10 NOTE — PROGRESS NOTES
CHIEF COMPLAINT:   Gynecologic Exam  Chief Complaint   Patient presents with    Annual Exam       HISTORY OF PRESENT ILLNESS  Patient presents for annual exam. The patient has no complaints today.   occ gets irritated after intercourse w her . No VB.     Patient's last menstrual period was 11/23/2013.  Postmenopausal      GYN screening history: last Pap: was normal. Never had any abnormal Pap smears in past.     Reports no bowel movement irregularities from baseline, bloating, or weight loss.    HRT:   None        Health Maintenance   Topic Date Due    Pneumococcal PPSV23 (Medium Risk) (1) 12/30/1981    Influenza Vaccine  08/01/2018    Mammogram  08/31/2018    Urine Microalbumin  09/30/2018    Hemoglobin A1c  01/20/2019    Lipid Panel  07/20/2019    Pap Smear with HPV Cotest  08/11/2019    Eye Exam  09/01/2019    Foot Exam  09/10/2019    TETANUS VACCINE  05/12/2021    Colonoscopy  07/24/2024    Hepatitis C Screening  Completed       HISTORY  Patient Active Problem List   Diagnosis    Hyperlipidemia LDL goal <70    Hypertension goal BP (blood pressure) < 130/80    Obesity (BMI 30-39.9)       Past Medical History:   Diagnosis Date    Allergic rhinitis     Anxiety     Asthma     Diabetes mellitus, type 2     Hyperlipidemia     Hypertension     Sarcoidosis        Past Surgical History:   Procedure Laterality Date    FOOT SURGERY  at 11y/o    arch lift       Family History   Problem Relation Age of Onset    Diabetes Mother     Hypertension Mother     Hypertension Father     Hyperlipidemia Father     Breast cancer Neg Hx     Colon cancer Neg Hx     Ovarian cancer Neg Hx        Social History     Socioeconomic History    Marital status:      Spouse name: None    Number of children: 1    Years of education: None    Highest education level: None   Social Needs    Financial resource strain: None    Food insecurity - worry: None    Food insecurity - inability: None     Transportation needs - medical: None    Transportation needs - non-medical: None   Occupational History     Employer: [employer]   Tobacco Use    Smoking status: Never Smoker    Smokeless tobacco: Never Used   Substance and Sexual Activity    Alcohol use: No     Alcohol/week: 0.0 oz    Drug use: No    Sexual activity: Yes     Partners: Male     Birth control/protection: Post-menopausal   Other Topics Concern    None   Social History Narrative    None       Current Outpatient Medications   Medication Sig Dispense Refill    albuterol (ACCUNEB) 0.63 mg/3 mL Nebu Take 3 mLs (0.63 mg total) by nebulization every 6 (six) hours as needed. Rescue 300 mL 3    blood sugar diagnostic Strp Once daily glucose testing. 50 strip 6    blood-glucose meter Misc Use as directed. 1 each 0    chlorthalidone (HYGROTEN) 25 MG Tab TAKE 1 TABLET(25 MG) BY MOUTH EVERY DAY 90 tablet 0    fluticasone (FLONASE) 50 mcg/actuation nasal spray 1 spray by Each Nare route 2 (two) times daily. 16 g 3    lancets (LANCETS,THIN) Misc Once daily glucose testing. 50 each 6    metFORMIN (GLUCOPHAGE-XR) 500 MG 24 hr tablet Take 1 tablet (500 mg total) by mouth daily with breakfast. 30 tablet 3    pravastatin (PRAVACHOL) 80 MG tablet TAKE 1 TABLET(80 MG) BY MOUTH EVERY EVENING 30 tablet 3     No current facility-administered medications for this visit.        Review of patient's allergies indicates:   Allergen Reactions    Lipitor [atorvastatin]      Other reaction(s): Muscle pain           PHYSICAL EXAM     Vitals:    09/10/18 1044   BP: (!) 136/90       PAIN SCALE: 0/10 None    ROS:  GENERAL: No fever, chills, fatigability or weight loss.  ABDOMEN: Appetite fine. No weight loss. Denies diarrhea, abdominal pain, hematemesis or blood in stool.  No change in bowel movement pattern.  URINARY: No flank pain, dysuria or hematuria.  REPRODUCTIVE: No abnormal vaginal bleeding.  BREASTS: Breasts symmetric, nontender and no lumps  detected.    PE:   APPEARANCE: Well nourished, well developed, in no acute distress.  NECK: Neck symmetric without masses or thyromegaly.    NODES: No inguinal lymph node enlargement.  ABDOMEN: Soft. No tenderness or masses. No hepatosplenomegaly. No hernias.  BREASTS: Symmetrical, no skin changes or visible lesions. No palpable masses, nipple discharge or adenopathy bilaterally.    PELVIC:   VULVA: mild erythema of lab mj with satellite lesions. Normal female genitalia.  URETHRAL MEATUS: Normal size and location, no lesions, no prolapse.  URETHRA: No masses, tenderness, prolapse or scarring.  VAGINA: Moist and well rugated, no discharge, no significant cystocele or rectocele.  CERVIX: No lesions, normal diameter, no stenosis, no cervical motion tenderness.  UTERUS: 8 week size, regular shape, mobile, non-tender, normal position, good support.  ADNEXA: No masses, tenderness or CDS nodularity.  ANUS PERINEUM: No lesions, no relaxation, external hemorrhoids noted.       DIAGNOSIS:   1. Normal gyn exam  2. Mild vulvar candida  3. DM2    PLAN:   Mammogram    Colonoscopy    Diflucan   Reviewed lubricants and perineal ironing, glucose control.     MEDICATIONS PRESCRIBED:   Calcium vitamin D and weight bearing exercise    COUNSELING:  Patient was counseled today on A.C.S. Pap guidelines and recommendations for yearly pelvic exams, mammograms and monthly self breast exams; to see her PCP for other health maintenance.     FOLLOW-UP: With me in 1 year. Pap smear every 3 years.

## 2018-09-10 NOTE — TELEPHONE ENCOUNTER
Returned pharm phone call. They stated they received two diflucan prescriptions. One for 100 mg and one for 150 mg. Wanted to know which one needed to be filled. Advised her that Dr. Stewart discontinued the 100 mg. They verbalized understanding.

## 2018-09-10 NOTE — TELEPHONE ENCOUNTER
----- Message from Sera Lawrence sent at 9/10/2018 12:01 PM CDT -----  Contact: [facility]   Pharmacist is calling regarding needing clarification and direction on the dosage on   fluconazole (DIFLUCAN) 150 MG Tab and 50 mg.       ...  [facility] [address] [place] [id number] [facility] [address] [place] [address] & [place] [address]  [address] [address] [address]  Phone: [phone] Fax: [phone]

## 2018-09-10 NOTE — PROGRESS NOTES
Diabetes Education  Author: Isaac Major RD  Date: 9/10/2018    Diabetes Education Visit  Diabetes Education Record Assessment/Progress: Initial    Diabetes Type  Diabetes Type : Type II    Diabetes History  Diabetes Diagnosis: 0-1 year(dx 2018)    Referring Provider: Miranda Chavze DO  52 y.o. female in clinic today for diabetes education. Patient has not taken diabetes education courses in the past. Mom has DM so she is familiar with testing BG and understands that she must make dietary cahnges.    DM MEDICATIONS:    Metformin XR, 500 mg once daily    Hemoglobin A1C   Date Value Ref Range Status   2018 6.5 (H) 4.0 - 5.6 % Final     Comment:     ADA Screening Guidelines:  5.7-6.4%  Consistent with prediabetes  >or=6.5%  Consistent with diabetes  High levels of fetal hemoglobin interfere with the HbA1C  assay. Heterozygous hemoglobin variants (HbS, HgC, etc)do  not significantly interfere with this assay.   However, presence of multiple variants may affect accuracy.       Nutrition  Meal Planning: water, skipping meals  What type of beverages do you drink?: water, diet soda/tea, sport drinks(Coke Zero occasionally; Powerade and Gatorade)  Meal Plan 24 Hour Recall - Breakfast: none  Meal Plan 24 Hour Recall - Lunch: Chinese restaurant - shrimp fried rice, fried wings, water  Meal Plan 24 Hour Recall - Dinner: small portion of pasta w/chicken, water  Meal Plan 24 Hour Recall - Snack: grapes; chips    Monitoring   Self Monitoring : per pt recall, fastin's, 85 once   Blood Glucose Logs: No  Do you use a personal glucose monitor?: No  In the last month, how often have you had a low blood sugar reaction?: once  What are your symptoms of low blood sugar?: confused - felt this at BG of 85  How do you treat low blood sugar?: drank Coke  Can you tell when your blood sugar is too high?: no    Exercise   Exercise Type: walking(membership at [employer])  Intensity: Low  Frequency: (2-3 days/wk)  Duration:  30 min    Current Diabetes Treatment   Current Treatment: Oral Medication, Diet    Social History  Preferred Learning Method: Face to Face  Primary Support: Self, Spouse  Educational Level: Some College  Occupation:  at [employer]  Smoking Status: Never a Smoker  Alcohol Use: Never       DDS-2 Score  ( > 3 = SIGNIFICANT DISTRESS): 3        Barriers to Change  Barriers to Change: None  Learning Challenges : None    Readiness to Learn   Readiness to Learn : Acceptance    Cultural Influences  Cultural Influences: No    Diabetes Education Assessment/Progress  Diabetes Disease Process (diabetes disease process and treatment options): Discussion, Individual Session, Demonstrates Understanding/Competency(verbalizes/demonstrates)  Nutrition (Incorporating nutritional management into one's lifestyle): Discussion, Instructed, Individual Session, Demonstrates Understanding/Competency (verbalizes/demonstrates), Written Materials Provided  Physical Activity (incorporating physical activity into one's lifestyle): Discussion, Individual Session, Demonstrates Understanding/Competency (verbalizes/demonstrates)  Medications (states correct name, dose, onset, peak, duration, side effects & timing of meds): Discussion, Individual Session, Demonstrates Understanding/Competency(verbalizes/demonstrates)  Monitoring (monitoring blood glucose/other parameters & using results): Discussion, Instructed, Individual Session, Demonstrates Understanding/Competency (verbalizes/demonstrates), Written Materials Provided  Acute Complications (preventing, detecting, and treating acute complications): Discussion, Individual Session, Demonstrates Understanding/Competency (verbalizes/demonstrates), Written Materials Provided, Instructed  Chronic Complications (preventing, detecting, and treating chronic complications): Discussion, Individual Session, Demonstrates Understanding/Competency (verbalizes/demonstrates)  Clinical  (diabetes, other pertinent medical history, and relevant comorbidities reviewed during visit): Discussion, Individual Session, Demonstrates Understanding/Competency (verbalizes/demonstrates)  Cognitive (knowledge of self-management skills, functional health literacy): Discussion, Individual Session, Demonstrates Understanding/Competency (verbalizes/demonstrates)  Psychosocial (emotional response to diabetes): Discussion, Individual Session, Demonstrates Understanding/Competency (verbalizes/demonstrates)  Diabetes Distress and Support Systems: Other, Individual Session  Behavioral (readiness for change, lifestyle practices, self-care behaviors): Discussion, Individual Session, Demonstrates Understanding/Competency (verbalizes/demonstrates)    Goals  Patient has selected/evaluated goals during today's session: Yes, selected  Healthy Eating: Set(Eat 3 meals per day; follow meal plan; avoid sugary beverages )  Start Date: 09/10/18  Target Date: 11/05/18  Physical Activity: Set(cont 30 min of exercise, increase to 3-5 days/wk)  Start Date: 09/10/18  Target Date: 11/05/18  Monitoring: In Progress(check BG daily)     Today's Diabetes Self-Management Plan was developed with the patient's input and was based on identified barriers from today's assessment.      Short-term goals written today along with the patient/caregiver and the patient has agreed to working towards the above goals to improve his overall diabetes control.      Provided pt with a copy of today's self-management plan and goals. Pt verbalized understanding of the care plan, goals, and all of today's instructions.      Encouraged pt to communicate with his/her physician and care team regarding his/her condition(s) and treatment.  Provided my contact information today and encouraged pt to call me for questions or message me through the patient portal as needed.     Diabetes Care Plan/Intervention  Education Plan/Intervention: Individual Follow-Up DSMT   F/u in  8 wks    Diabetes Meal Plan  Calories: 1600  Carbohydrate Per Meal: 30-45g  Carbohydrate Per Snack : 15-20g    Education Units of Time   Time Spent: 60 min    Health Maintenance was reviewed today with patient. Discussed with patient importance of routine eye exams, foot exams/foot care, blood work (i.e.: A1c, microalbumin, and lipid), dental visits, yearly flu vaccine, and pneumonia vaccine as indicated by PCP. Patient verbalized understanding.     Health Maintenance Topics with due status: Not Due       Topic Last Completion Date    TETANUS VACCINE 05/12/2011    Colonoscopy 07/24/2014    Pap Smear with HPV Cotest 08/11/2016    Lipid Panel 07/20/2018    Hemoglobin A1c 07/20/2018    Eye Exam 09/01/2018    Low Dose Statin 09/10/2018    Foot Exam 09/10/2018     Health Maintenance Due   Topic Date Due    Pneumococcal PPSV23 (Medium Risk) (1) 12/30/1981    Influenza Vaccine  08/01/2018    Mammogram  08/31/2018    Urine Microalbumin  09/30/2018

## 2018-10-25 ENCOUNTER — OFFICE VISIT (OUTPATIENT)
Dept: INTERNAL MEDICINE | Facility: CLINIC | Age: 53
End: 2018-10-25
Payer: COMMERCIAL

## 2018-10-25 ENCOUNTER — CLINICAL SUPPORT (OUTPATIENT)
Dept: CARDIOLOGY | Facility: CLINIC | Age: 53
End: 2018-10-25
Payer: COMMERCIAL

## 2018-10-25 VITALS
HEIGHT: 65 IN | SYSTOLIC BLOOD PRESSURE: 130 MMHG | WEIGHT: 233 LBS | HEART RATE: 72 BPM | TEMPERATURE: 98 F | DIASTOLIC BLOOD PRESSURE: 80 MMHG | BODY MASS INDEX: 38.82 KG/M2

## 2018-10-25 DIAGNOSIS — R07.89 CHEST PAIN, ATYPICAL: ICD-10-CM

## 2018-10-25 DIAGNOSIS — Z23 IMMUNIZATION DUE: ICD-10-CM

## 2018-10-25 DIAGNOSIS — J45.21 MILD INTERMITTENT ASTHMA WITH EXACERBATION: Primary | ICD-10-CM

## 2018-10-25 DIAGNOSIS — F43.9 STRESS: ICD-10-CM

## 2018-10-25 PROCEDURE — 93005 ELECTROCARDIOGRAM TRACING: CPT | Mod: S$GLB,,, | Performed by: INTERNAL MEDICINE

## 2018-10-25 PROCEDURE — 90686 IIV4 VACC NO PRSV 0.5 ML IM: CPT | Mod: S$GLB,,, | Performed by: INTERNAL MEDICINE

## 2018-10-25 PROCEDURE — 3008F BODY MASS INDEX DOCD: CPT | Mod: CPTII,S$GLB,, | Performed by: INTERNAL MEDICINE

## 2018-10-25 PROCEDURE — 93010 ELECTROCARDIOGRAM REPORT: CPT | Mod: S$GLB,,, | Performed by: INTERNAL MEDICINE

## 2018-10-25 PROCEDURE — 99214 OFFICE O/P EST MOD 30 MIN: CPT | Mod: 25,S$GLB,, | Performed by: INTERNAL MEDICINE

## 2018-10-25 PROCEDURE — 99999 PR PBB SHADOW E&M-EST. PATIENT-LVL III: CPT | Mod: PBBFAC,,, | Performed by: INTERNAL MEDICINE

## 2018-10-25 PROCEDURE — 3079F DIAST BP 80-89 MM HG: CPT | Mod: CPTII,S$GLB,, | Performed by: INTERNAL MEDICINE

## 2018-10-25 PROCEDURE — 90471 IMMUNIZATION ADMIN: CPT | Mod: S$GLB,,, | Performed by: INTERNAL MEDICINE

## 2018-10-25 PROCEDURE — 3075F SYST BP GE 130 - 139MM HG: CPT | Mod: CPTII,S$GLB,, | Performed by: INTERNAL MEDICINE

## 2018-10-25 RX ORDER — IPRATROPIUM BROMIDE 0.5 MG/2.5ML
500 SOLUTION RESPIRATORY (INHALATION) ONCE
Qty: 1 VIAL | Refills: 0 | Status: SHIPPED | OUTPATIENT
Start: 2018-10-25 | End: 2023-12-19

## 2018-10-25 RX ORDER — ALBUTEROL SULFATE 0.83 MG/ML
2.5 SOLUTION RESPIRATORY (INHALATION) ONCE
Qty: 1 EACH | Refills: 0 | Status: SHIPPED | OUTPATIENT
Start: 2018-10-25 | End: 2018-11-08 | Stop reason: ALTCHOICE

## 2018-10-25 RX ORDER — IPRATROPIUM BROMIDE AND ALBUTEROL SULFATE 2.5; .5 MG/3ML; MG/3ML
3 SOLUTION RESPIRATORY (INHALATION) EVERY 6 HOURS PRN
Qty: 1 BOX | Refills: 3 | Status: SHIPPED | OUTPATIENT
Start: 2018-10-25 | End: 2020-03-24 | Stop reason: SDUPTHER

## 2018-10-25 RX ORDER — IPRATROPIUM BROMIDE AND ALBUTEROL SULFATE 2.5; .5 MG/3ML; MG/3ML
3 SOLUTION RESPIRATORY (INHALATION)
Status: DISCONTINUED | OUTPATIENT
Start: 2018-10-25 | End: 2018-10-25

## 2018-10-25 NOTE — LETTER
October 25, 2018                 [facility]  Internal Medicine  [address] [place] [id number]  Phone: [phone]  Fax: [phone]   October 25, 2018     Patient: Leydi Quintero   YOB: 1965   Date of Visit: 10/25/2018       To Whom it May Concern:    Leydi Quintero was seen in my clinic on 10/25/2018. She may return to work on 10/29/2018.    If you have any questions or concerns, please don't hesitate to call.    Sincerely,         Jie Neal LPN/Dr. Miranda Chavez.

## 2018-10-26 ENCOUNTER — TELEPHONE (OUTPATIENT)
Dept: INTERNAL MEDICINE | Facility: CLINIC | Age: 53
End: 2018-10-26

## 2018-10-26 RX ORDER — ALBUTEROL SULFATE 0.83 MG/ML
2.5 SOLUTION RESPIRATORY (INHALATION)
Status: DISCONTINUED | OUTPATIENT
Start: 2018-10-26 | End: 2018-11-08

## 2018-10-26 RX ORDER — IPRATROPIUM BROMIDE 0.5 MG/2.5ML
0.5 SOLUTION RESPIRATORY (INHALATION)
Status: DISCONTINUED | OUTPATIENT
Start: 2018-10-26 | End: 2019-08-29

## 2018-10-26 NOTE — PROGRESS NOTES
Subjective:       Patient ID: Leydi Quintero is a 52 y.o. female.    Chief Complaint: Shortness of Breath and Chest Pain    Shortness of Breath   This is a new problem. The current episode started yesterday. The problem occurs intermittently. The problem has been waxing and waning. Associated symptoms include chest pain and wheezing. Pertinent negatives include no abdominal pain, fever, hemoptysis, neck pain, sputum production or vomiting. The symptoms are aggravated by emotional upset. The patient has no known risk factors for DVT/PE. She has tried rest for the symptoms. The treatment provided mild relief. Her past medical history is significant for asthma. There is no history of CAD, COPD, a heart failure or a recent surgery.   Chest Pain    This is a new problem. The current episode started yesterday. The onset quality is sudden. The problem occurs intermittently. The problem has been waxing and waning. The pain is present in the substernal region. The pain is mild. The quality of the pain is described as pressure. The pain does not radiate. Associated symptoms include nausea and shortness of breath. Pertinent negatives include no abdominal pain, cough, dizziness, fever, hemoptysis, sputum production or vomiting. The pain is aggravated by emotional upset. She has tried rest for the symptoms. The treatment provided mild relief. Risk factors include post-menopausal and obesity.   Her past medical history is significant for hyperlipidemia and hypertension.   Pertinent negatives for past medical history include no CAD, no COPD, no CHF, no recent injury and no strokes.   Her family medical history is significant for hypertension.   Pertinent negatives for family medical history include: no CAD and no early MI.     Review of Systems   Constitutional: Negative for fever.   Respiratory: Positive for shortness of breath and wheezing. Negative for cough, hemoptysis, sputum production and chest tightness.     Cardiovascular: Positive for chest pain.   Gastrointestinal: Positive for nausea. Negative for abdominal pain and vomiting.   Musculoskeletal: Negative for neck pain.   Neurological: Negative for dizziness.       Objective:      Physical Exam   Constitutional: She is oriented to person, place, and time. She appears well-developed and well-nourished. No distress.   Eyes: No scleral icterus.   Neck: No tracheal deviation present. No thyromegaly present.   Cardiovascular: Normal rate, regular rhythm and normal heart sounds.   No murmur heard.  Pulmonary/Chest: Effort normal and breath sounds normal. No respiratory distress. She has no wheezes. She has no rales. She exhibits no tenderness.   Abdominal: Soft. Bowel sounds are normal. There is no tenderness.   Lymphadenopathy:     She has no cervical adenopathy.   Neurological: She is alert and oriented to person, place, and time.   Skin: Skin is warm and dry.   Psychiatric: She has a normal mood and affect.   Vitals reviewed.      Assessment:       1. Mild intermittent asthma with exacerbation    2. Chest pain, atypical    3. Stress    4. Immunization due        Plan:       Leydi was seen today for shortness of breath and chest pain.    Diagnoses and all orders for this visit:    Mild intermittent asthma with exacerbation  -     albuterol-ipratropium (DUO-NEB) 2.5 mg-0.5 mg/3 mL nebulizer solution; Take 3 mLs by nebulization every 6 (six) hours as needed for Wheezing. Rescue  -     albuterol nebulizer solution 2.5 mg  -     ipratropium 0.02 % nebulizer solution 0.5 mg  -     Evaluated after neb treatment and she is feeling slightly better     Chest pain, atypical  -     SCHEDULED EKG 12-LEAD (to [facility]); Future  -     X-Ray Chest PA And Lateral; Future    Stress  -     Stress management  -     Recommend a couple days off from work--excuse provided    Immunization due  -     Influenza - Quadrivalent (3 years & older) (PF)    RTC if symptoms fail to resolve. Recommend  going to the ER if symptoms worsen.

## 2018-10-26 NOTE — TELEPHONE ENCOUNTER
----- Message from Jie Neal LPN sent at 10/25/2018  1:59 PM CDT -----  Please order the breathing treatment separate as we are out of the combo so I gave 1-ipratropium inhalation solution and 1- albuterol sulfate inhalation solution. I cannot chart that I gave it.

## 2018-11-08 ENCOUNTER — OFFICE VISIT (OUTPATIENT)
Dept: INTERNAL MEDICINE | Facility: CLINIC | Age: 53
End: 2018-11-08
Payer: COMMERCIAL

## 2018-11-08 ENCOUNTER — LAB VISIT (OUTPATIENT)
Dept: LAB | Facility: HOSPITAL | Age: 53
End: 2018-11-08
Attending: INTERNAL MEDICINE
Payer: COMMERCIAL

## 2018-11-08 VITALS
OXYGEN SATURATION: 99 % | DIASTOLIC BLOOD PRESSURE: 82 MMHG | WEIGHT: 231.5 LBS | SYSTOLIC BLOOD PRESSURE: 130 MMHG | HEIGHT: 65 IN | TEMPERATURE: 97 F | HEART RATE: 69 BPM | BODY MASS INDEX: 38.57 KG/M2

## 2018-11-08 DIAGNOSIS — I10 HYPERTENSION GOAL BP (BLOOD PRESSURE) < 130/80: Primary | Chronic | ICD-10-CM

## 2018-11-08 DIAGNOSIS — I10 HYPERTENSION GOAL BP (BLOOD PRESSURE) < 140/90: ICD-10-CM

## 2018-11-08 DIAGNOSIS — E78.5 HYPERLIPIDEMIA LDL GOAL <130: ICD-10-CM

## 2018-11-08 DIAGNOSIS — R73.01 IMPAIRED FASTING GLUCOSE: ICD-10-CM

## 2018-11-08 DIAGNOSIS — E11.9 CONTROLLED TYPE 2 DIABETES MELLITUS WITHOUT COMPLICATION, WITHOUT LONG-TERM CURRENT USE OF INSULIN: ICD-10-CM

## 2018-11-08 DIAGNOSIS — E78.5 HYPERLIPIDEMIA LDL GOAL <70: Chronic | ICD-10-CM

## 2018-11-08 DIAGNOSIS — Z23 IMMUNIZATION DUE: ICD-10-CM

## 2018-11-08 DIAGNOSIS — E66.01 SEVERE OBESITY (BMI 35.0-39.9) WITH COMORBIDITY: ICD-10-CM

## 2018-11-08 DIAGNOSIS — J45.20 MILD INTERMITTENT ASTHMA WITHOUT COMPLICATION: ICD-10-CM

## 2018-11-08 LAB
ALBUMIN SERPL BCP-MCNC: 3.8 G/DL
ALP SERPL-CCNC: 81 U/L
ALT SERPL W/O P-5'-P-CCNC: 15 U/L
ANION GAP SERPL CALC-SCNC: 11 MMOL/L
AST SERPL-CCNC: 22 U/L
BILIRUB SERPL-MCNC: 0.6 MG/DL
BUN SERPL-MCNC: 11 MG/DL
CALCIUM SERPL-MCNC: 10.1 MG/DL
CHLORIDE SERPL-SCNC: 104 MMOL/L
CHOLEST SERPL-MCNC: 306 MG/DL
CHOLEST/HDLC SERPL: 6.4 {RATIO}
CO2 SERPL-SCNC: 26 MMOL/L
CREAT SERPL-MCNC: 1 MG/DL
EST. GFR  (AFRICAN AMERICAN): >60 ML/MIN/1.73 M^2
EST. GFR  (NON AFRICAN AMERICAN): >60 ML/MIN/1.73 M^2
ESTIMATED AVG GLUCOSE: 143 MG/DL
GLUCOSE SERPL-MCNC: 114 MG/DL
HBA1C MFR BLD HPLC: 6.6 %
HDLC SERPL-MCNC: 48 MG/DL
HDLC SERPL: 15.7 %
LDLC SERPL CALC-MCNC: 218 MG/DL
NONHDLC SERPL-MCNC: 258 MG/DL
POTASSIUM SERPL-SCNC: 3.4 MMOL/L
PROT SERPL-MCNC: 7.8 G/DL
SODIUM SERPL-SCNC: 141 MMOL/L
TRIGL SERPL-MCNC: 200 MG/DL

## 2018-11-08 PROCEDURE — 3008F BODY MASS INDEX DOCD: CPT | Mod: CPTII,S$GLB,, | Performed by: INTERNAL MEDICINE

## 2018-11-08 PROCEDURE — 99999 PR PBB SHADOW E&M-EST. PATIENT-LVL III: CPT | Mod: PBBFAC,,, | Performed by: INTERNAL MEDICINE

## 2018-11-08 PROCEDURE — 3044F HG A1C LEVEL LT 7.0%: CPT | Mod: CPTII,S$GLB,, | Performed by: INTERNAL MEDICINE

## 2018-11-08 PROCEDURE — 3075F SYST BP GE 130 - 139MM HG: CPT | Mod: CPTII,S$GLB,, | Performed by: INTERNAL MEDICINE

## 2018-11-08 PROCEDURE — 90732 PPSV23 VACC 2 YRS+ SUBQ/IM: CPT | Mod: S$GLB,,, | Performed by: INTERNAL MEDICINE

## 2018-11-08 PROCEDURE — 99214 OFFICE O/P EST MOD 30 MIN: CPT | Mod: 25,S$GLB,, | Performed by: INTERNAL MEDICINE

## 2018-11-08 PROCEDURE — 36415 COLL VENOUS BLD VENIPUNCTURE: CPT

## 2018-11-08 PROCEDURE — 90471 IMMUNIZATION ADMIN: CPT | Mod: S$GLB,,, | Performed by: INTERNAL MEDICINE

## 2018-11-08 PROCEDURE — 80061 LIPID PANEL: CPT

## 2018-11-08 PROCEDURE — 3079F DIAST BP 80-89 MM HG: CPT | Mod: CPTII,S$GLB,, | Performed by: INTERNAL MEDICINE

## 2018-11-08 PROCEDURE — 83036 HEMOGLOBIN GLYCOSYLATED A1C: CPT

## 2018-11-08 PROCEDURE — 80053 COMPREHEN METABOLIC PANEL: CPT

## 2018-11-08 RX ORDER — METFORMIN HYDROCHLORIDE 500 MG/1
500 TABLET, EXTENDED RELEASE ORAL
Qty: 90 TABLET | Refills: 1 | Status: SHIPPED | OUTPATIENT
Start: 2018-11-08 | End: 2019-04-08 | Stop reason: SDUPTHER

## 2018-11-08 RX ORDER — CHLORTHALIDONE 25 MG/1
TABLET ORAL
Qty: 90 TABLET | Refills: 1 | Status: SHIPPED | OUTPATIENT
Start: 2018-11-08 | End: 2019-06-19 | Stop reason: SDUPTHER

## 2018-11-08 RX ORDER — PRAVASTATIN SODIUM 80 MG/1
TABLET ORAL
Qty: 90 TABLET | Refills: 1 | Status: SHIPPED | OUTPATIENT
Start: 2018-11-08 | End: 2019-08-04 | Stop reason: SDUPTHER

## 2018-11-08 NOTE — PROGRESS NOTES
Subjective:       Patient ID: Leydi Quintero is a 52 y.o. female.    Chief Complaint: Follow-up    Leydi Quintero  52 y.o. Black or  female    Patient presents with:  Follow-up    HPI: Here today to follow up on chronic conditions.  HTN--stable on chlorthalidone. She denies symptoms.   Diabetes--compliant with metformin. She doesn't check her glucoses daily but states when she does the reading is good.                      HGBA1C                   6.5 (H)             07/20/2018            HLD--compliant with pravastatin. She is fasting today for labs.                    CHOL                     304 (H)             07/20/2018                 HDL                      39 (L)              07/20/2018                 LDLCALC                  212.2 (H)           07/20/2018                 TRIG                     264 (H)             07/20/2018            Asthma--stable on as needed albuterol. She denies symptoms.       Past Medical History:  Allergic rhinitis  Anxiety  Asthma  Diabetes mellitus, type 2  Hyperlipidemia  Hypertension  Sarcoidosis    Current Outpatient Medications on File Prior to Visit:  albuterol (PROVENTIL) 2.5 mg /3 mL (0.083 %) nebulizer solution, Take 3 mLs (2.5 mg total) by nebulization once. Rescue for 1 dose, Disp: 1 each, Rfl: 0  albuterol-ipratropium (DUO-NEB) 2.5 mg-0.5 mg/3 mL nebulizer solution, Take 3 mLs by nebulization every 6 (six) hours as needed for Wheezing. Rescue, Disp: 1 Box, Rfl: 3  blood sugar diagnostic Strp, Once daily glucose testing., Disp: 50 strip, Rfl: 6  blood-glucose meter Misc, Use as directed., Disp: 1 each, Rfl: 0  fluticasone (FLONASE) 50 mcg/actuation nasal spray, 1 spray by Each Nare route 2 (two) times daily., Disp: 16 g, Rfl: 3  ipratropium (ATROVENT) 0.02 % nebulizer solution, Take 2.5 mLs (500 mcg total) by nebulization once. Rescue for 1 dose, Disp: 1 vial, Rfl: 0  chlorthalidone (HYGROTEN) 25 MG Tab, TAKE 1 TABLET(25 MG) BY MOUTH  EVERY DAY, Disp: 90 tablet, Rfl: 0  metFORMIN (GLUCOPHAGE-XR) 500 MG 24 hr tablet, Take 1 tablet (500 mg total) by mouth daily with breakfast., Disp: 30 tablet, Rfl: 3  pravastatin (PRAVACHOL) 80 MG tablet, TAKE 1 TABLET(80 MG) BY MOUTH EVERY EVENING, Disp: 30 tablet, Rfl: 3  lancets (LANCETS,THIN) Misc, Once daily glucose testing., Disp: 50 each, Rfl: 6    Allergies:  Review of patient's allergies indicates:   -- Lipitor (atorvastatin)     --  Other reaction(s): Muscle pain        Review of Systems   Constitutional: Negative for fever and unexpected weight change.   Respiratory: Negative for cough, shortness of breath and wheezing.    Cardiovascular: Negative for chest pain and leg swelling.   Gastrointestinal: Negative for abdominal pain, blood in stool, constipation and diarrhea.   Genitourinary: Negative for difficulty urinating and frequency.   Musculoskeletal: Negative for gait problem.   Neurological: Negative for dizziness, numbness and headaches.   Psychiatric/Behavioral: Negative for sleep disturbance.       Objective:      Physical Exam   Constitutional: She is oriented to person, place, and time. She appears well-developed and well-nourished. No distress.   Eyes: No scleral icterus.   Neck: No tracheal deviation present.   Cardiovascular: Normal rate, regular rhythm and normal heart sounds.   Pulses:       Dorsalis pedis pulses are 2+ on the right side, and 2+ on the left side.   Pulmonary/Chest: Effort normal and breath sounds normal. No respiratory distress. She has no wheezes. She has no rales.   Abdominal: Soft. Bowel sounds are normal.   Musculoskeletal: She exhibits no edema.   Feet:   Right Foot:   Protective Sensation: 4 sites tested. 4 sites sensed.   Skin Integrity: Positive for callus. Negative for ulcer.   Left Foot:   Protective Sensation: 4 sites tested. 4 sites sensed.   Skin Integrity: Positive for callus. Negative for ulcer.   Neurological: She is alert and oriented to person, place, and  time.   Skin: Skin is warm and dry.   Psychiatric: She has a normal mood and affect.   Vitals reviewed.      Assessment:       1. Hypertension goal BP (blood pressure) < 130/80    2. Controlled type 2 diabetes mellitus without complication, without long-term current use of insulin    3. Hyperlipidemia LDL goal <70    4. Mild intermittent asthma without complication    5. Severe obesity (BMI 35.0-39.9) with comorbidity    6. Immunization due        Plan:       Leydi was seen today for follow-up.    Diagnoses and all orders for this visit:    Hypertension goal BP (blood pressure) < 130/80  -     Refill chlorthalidone (HYGROTEN) 25 MG Tab; TAKE 1 TABLET(25 MG) BY MOUTH EVERY DAY    Controlled type 2 diabetes mellitus without complication, without long-term current use of insulin  -     Refill metFORMIN (GLUCOPHAGE-XR) 500 MG 24 hr tablet; Take 1 tablet (500 mg total) by mouth daily with breakfast.  -     Check A1C and CMP     Hyperlipidemia LDL goal <70  -     Refill pravastatin (PRAVACHOL) 80 MG tablet; TAKE 1 TABLET(80 MG) BY MOUTH EVERY EVENING  -     Check lipid panel     Mild intermittent asthma without complication    Severe obesity (BMI 35.0-39.9) with comorbidity  -     Lifestyle modifications     Immunization due  -     (In Office Administered) Pneumococcal Polysaccharide Vaccine (23 Valent) (SQ/IM)    Labs today.     F/U in 3 months.

## 2018-11-12 ENCOUNTER — TELEPHONE (OUTPATIENT)
Dept: INTERNAL MEDICINE | Facility: CLINIC | Age: 53
End: 2018-11-12

## 2018-11-12 NOTE — TELEPHONE ENCOUNTER
----- Message from Miranda Chavez DO sent at 11/9/2018  7:20 PM CST -----  Results and recommendations sent to patient's [facility] account.  Schedule A1C, CMP, lipid panel and f/u in 3 months.

## 2018-12-28 ENCOUNTER — OFFICE VISIT (OUTPATIENT)
Dept: INTERNAL MEDICINE | Facility: CLINIC | Age: 53
End: 2018-12-28
Payer: COMMERCIAL

## 2018-12-28 VITALS
DIASTOLIC BLOOD PRESSURE: 80 MMHG | WEIGHT: 232.81 LBS | HEIGHT: 65 IN | TEMPERATURE: 98 F | OXYGEN SATURATION: 99 % | SYSTOLIC BLOOD PRESSURE: 128 MMHG | HEART RATE: 75 BPM | BODY MASS INDEX: 38.79 KG/M2

## 2018-12-28 DIAGNOSIS — E11.9 CONTROLLED TYPE 2 DIABETES MELLITUS WITHOUT COMPLICATION, WITHOUT LONG-TERM CURRENT USE OF INSULIN: ICD-10-CM

## 2018-12-28 DIAGNOSIS — L02.91 ABSCESS: Primary | ICD-10-CM

## 2018-12-28 PROCEDURE — 3074F SYST BP LT 130 MM HG: CPT | Mod: CPTII,S$GLB,, | Performed by: PHYSICIAN ASSISTANT

## 2018-12-28 PROCEDURE — 3044F HG A1C LEVEL LT 7.0%: CPT | Mod: CPTII,S$GLB,, | Performed by: PHYSICIAN ASSISTANT

## 2018-12-28 PROCEDURE — 3008F BODY MASS INDEX DOCD: CPT | Mod: CPTII,S$GLB,, | Performed by: PHYSICIAN ASSISTANT

## 2018-12-28 PROCEDURE — 99999 PR PBB SHADOW E&M-EST. PATIENT-LVL IV: CPT | Mod: PBBFAC,,, | Performed by: PHYSICIAN ASSISTANT

## 2018-12-28 PROCEDURE — 99214 OFFICE O/P EST MOD 30 MIN: CPT | Mod: S$GLB,,, | Performed by: PHYSICIAN ASSISTANT

## 2018-12-28 PROCEDURE — 3079F DIAST BP 80-89 MM HG: CPT | Mod: CPTII,S$GLB,, | Performed by: PHYSICIAN ASSISTANT

## 2018-12-28 RX ORDER — SULFAMETHOXAZOLE AND TRIMETHOPRIM 800; 160 MG/1; MG/1
1 TABLET ORAL 2 TIMES DAILY
Qty: 20 TABLET | Refills: 0 | Status: SHIPPED | OUTPATIENT
Start: 2018-12-28 | End: 2019-01-07

## 2018-12-28 NOTE — PROGRESS NOTES
Subjective:       Patient ID: Leydi Quintero is a 52 y.o. female.    Chief Complaint: Mass (ingrown hair bump on right shoulder blade. she said it was there for 2 years and then a few days ago it grew and began to hurt. she said it popped last night she wants you to look at it) and Shoulder Pain (right shoulder pain caused by the bump)  Condition is complicated by DM 2. Last HAIC 6.0  Abscess   Chronicity:  NewProgression Since Onset: rapidly improving  Location:  Shoulder/arm  Associated Symptoms: no fever, no chills  Characteristics: draining, painful, redness and swelling    Treatments Tried:  Draining/squeezing  Relieved by:  Draining/squeezing  Worsened by:  Nothing    Review of Systems   Constitutional: Negative for chills, fatigue and fever.   Respiratory: Negative for chest tightness and shortness of breath.    Cardiovascular: Negative for chest pain.   Gastrointestinal: Negative for abdominal pain.   Skin: Positive for color change and wound.       Objective:      Physical Exam   Constitutional: She appears well-nourished. No distress.   Cardiovascular: Normal rate and regular rhythm.   Pulmonary/Chest: Effort normal and breath sounds normal.   Skin: Lesion noted. She is not diaphoretic. There is erythema.        Nursing note and vitals reviewed.      Assessment:       1. Abscess      2 DM 2  Plan:       Abscess  -     bacitracin-polymyxin b (POLYSPORIN) ointment; Apply topically 2 (two) times daily. for 10 days  Dispense: 30 g; Refill: 11  -     sulfamethoxazole-trimethoprim 800-160mg (BACTRIM DS) 800-160 mg Tab; Take 1 tablet by mouth 2 (two) times daily. for 10 days  Dispense: 20 tablet; Refill: 0       expressed the remaining purulent material from the wound. Applies a bandage

## 2018-12-28 NOTE — PATIENT INSTRUCTIONS
Abscess (Antibiotic Treatment Only)  An abscess (sometimes called a boil) happens when bacteria get trapped under the skin and start to grow. Pus forms inside the abscess as the body responds to the bacteria. An abscess can happen with an insect bite, ingrown hair, blocked oil gland, pimple, cyst, or puncture wound.  In the early stages, your wound may be red and tender. For this stage, you may get antibiotics. If the abscess does not get better with antibiotics, it will need to be drained with a small cut.  Home care  These tips will help you care for your abscess at home:  · Soak the wound in hot water or apply hot packs (small towel soaked in hot water) to the area for 20 minutes at a time. Do this 3 to 4 times a day.  · Do not cut, squeeze, or pop the boil yourself.  · Apply antibiotic cream or ointment to the skin 3 to 4 times a day, unless something else was prescribed. Some ointments include an antibiotic plus a pain reliever.  · If your doctor prescribed antibiotics, do not stop taking them until you have finished the medicine or the doctor tells you to stop.  · You may use an over-the-counter pain medicine to control pain, unless another pain medicine was prescribed. If you have chronic liver or kidney disease or ever had a stomach ulcer or gastrointestinal bleeding, talk with your doctor before using these any of these.  Follow-up care  Follow up with your healthcare provider, or as advised. Check your wound each day for the signs of worsening infection listed below.  When to seek medical advice  Get prompt medical attention if any of these occur:  · An increase in redness or swelling  · Red streaks in the skin leading away from the abscess  · An increase in local pain or swelling  · Fever of 100.4ºF (38ºC) or higher, or as directed by your healthcare provider  · Pus or fluid coming from the abscess  · Boil returns after getting better  Date Last Reviewed: 9/1/2016  © 5444-7406 [employer] [address] [place] [address]. All rights reserved. This information is not intended as a substitute for professional medical care. Always follow your healthcare professional's instructions.        Abscess Drainage  An abscess is a pocket of pus that forms around an infection. Pus is a fluid made up of germs (bacteria), white blood cells, and other matter. Draining pus from an infected area or organ inside the body may be needed. This helps heal the infection. The procedure is often done by a specially trained doctor called an interventional radiologist.  How do I get ready for abscess drainage?  Follow any instructions you are given on how to prepare, including:  · Do not eat or drink anything for 6 hours before the procedure.  · Tell the technologist if you are, or could be, pregnant or if you are breastfeeding.  · Tell your doctor and the technologist if you are allergic to X-ray dye (contrast medium) or other medicines.  · Be sure your doctor knows about all medicines you take. You may be told to stop taking some or all of them before the test. This includes:  ¨ All prescription medicines  ¨ Over-the-counter medicines that don't need a prescription  ¨ Any street drugs you may use   ¨ Herbs, vitamins, kelp, seaweed, cough syrups, and other supplements  What happens during abscess drainage?  · You will change into a hospital gown and lie on an X-ray table. You may lie on your back, front, or side, depending on the site of the abscess.  · An IV (intravenous) line is put into a vein to give you fluids and medicines. You may be given medicine through the IV to help you relax.  · The skin over the abscess is cleaned. A local anesthetic is applied to numb the skin.  · Using CT, X-ray, or ultrasound images as a guide, the radiologist puts a needle through the skin and guides it to the abscess. The needle is then replaced with a catheter (thin, flexible tube).  · Pus drains from the abscess through the  catheter. A bag or suction bulb will be attached to the catheter to hold the pus as it drains.  · The drainage catheter may be temporarily sutured or taped to your skin to help secure it and prevent it from moving.  · The entire procedure may take 30 minutes or longer, depending on the location of the abscess.  What happens after abscess drainage?  · A slight fever is normal for the first 24 hours after the procedure.  · The catheter and drainage bag will likely remain in place for several days. Follow any instructions you are given for caring for the catheter and drainage site.  · See your doctor for a follow-up appointment to assess the infection and to have the catheter removed.  When to call the healthcare provider  Call your healthcare provider if you have:  · Fever (1°F above your normal temperature) lasting for 24 to 48 hours, or whatever your healthcare provider told you to report based on your health condition  · You feel new or worsened pain, fluid stops draining from the tube, or the tube moves or comes out.   Date Last Reviewed: 6/19/2015  © 3660-3447 [employer] [address] [place] [address], [place] [address]. All rights reserved. This information is not intended as a substitute for professional medical care. Always follow your healthcare professional's instructions.

## 2019-04-08 ENCOUNTER — PATIENT MESSAGE (OUTPATIENT)
Dept: INTERNAL MEDICINE | Facility: CLINIC | Age: 54
End: 2019-04-08

## 2019-04-08 ENCOUNTER — OFFICE VISIT (OUTPATIENT)
Dept: INTERNAL MEDICINE | Facility: CLINIC | Age: 54
End: 2019-04-08
Payer: COMMERCIAL

## 2019-04-08 ENCOUNTER — LAB VISIT (OUTPATIENT)
Dept: LAB | Facility: HOSPITAL | Age: 54
End: 2019-04-08
Attending: INTERNAL MEDICINE
Payer: COMMERCIAL

## 2019-04-08 VITALS
TEMPERATURE: 97 F | DIASTOLIC BLOOD PRESSURE: 82 MMHG | WEIGHT: 230.19 LBS | HEART RATE: 62 BPM | OXYGEN SATURATION: 98 % | SYSTOLIC BLOOD PRESSURE: 124 MMHG | HEIGHT: 65 IN | BODY MASS INDEX: 38.35 KG/M2

## 2019-04-08 DIAGNOSIS — R73.01 IMPAIRED FASTING GLUCOSE: ICD-10-CM

## 2019-04-08 DIAGNOSIS — R35.0 URINARY FREQUENCY: ICD-10-CM

## 2019-04-08 DIAGNOSIS — E78.5 HYPERLIPIDEMIA LDL GOAL <70: ICD-10-CM

## 2019-04-08 DIAGNOSIS — E66.01 SEVERE OBESITY WITH BODY MASS INDEX (BMI) OF 35.0 TO 39.9 WITH COMORBIDITY: ICD-10-CM

## 2019-04-08 DIAGNOSIS — I10 HYPERTENSION GOAL BP (BLOOD PRESSURE) < 140/90: ICD-10-CM

## 2019-04-08 DIAGNOSIS — I10 HYPERTENSION GOAL BP (BLOOD PRESSURE) < 130/80: Primary | ICD-10-CM

## 2019-04-08 DIAGNOSIS — E78.5 HYPERLIPIDEMIA LDL GOAL <130: ICD-10-CM

## 2019-04-08 DIAGNOSIS — E11.9 CONTROLLED TYPE 2 DIABETES MELLITUS WITHOUT COMPLICATION, WITHOUT LONG-TERM CURRENT USE OF INSULIN: ICD-10-CM

## 2019-04-08 LAB
ALBUMIN SERPL BCP-MCNC: 3.6 G/DL (ref 3.5–5.2)
ALBUMIN/CREAT UR: 2.8 UG/MG (ref 0–30)
ALP SERPL-CCNC: 78 U/L (ref 55–135)
ALT SERPL W/O P-5'-P-CCNC: 11 U/L (ref 10–44)
ANION GAP SERPL CALC-SCNC: 7 MMOL/L (ref 8–16)
AST SERPL-CCNC: 12 U/L (ref 10–40)
BILIRUB SERPL-MCNC: 0.5 MG/DL (ref 0.1–1)
BILIRUB UR QL STRIP: NEGATIVE
BUN SERPL-MCNC: 12 MG/DL (ref 6–20)
CALCIUM SERPL-MCNC: 10.2 MG/DL (ref 8.7–10.5)
CHLORIDE SERPL-SCNC: 103 MMOL/L (ref 95–110)
CHOLEST SERPL-MCNC: 244 MG/DL (ref 120–199)
CHOLEST/HDLC SERPL: 5.1 {RATIO} (ref 2–5)
CLARITY UR REFRACT.AUTO: CLEAR
CO2 SERPL-SCNC: 30 MMOL/L (ref 23–29)
COLOR UR AUTO: YELLOW
CREAT SERPL-MCNC: 1 MG/DL (ref 0.5–1.4)
CREAT UR-MCNC: 180 MG/DL (ref 15–325)
EST. GFR  (AFRICAN AMERICAN): >60 ML/MIN/1.73 M^2
EST. GFR  (NON AFRICAN AMERICAN): >60 ML/MIN/1.73 M^2
ESTIMATED AVG GLUCOSE: 137 MG/DL (ref 68–131)
GLUCOSE SERPL-MCNC: 127 MG/DL (ref 70–110)
GLUCOSE UR QL STRIP: NEGATIVE
HBA1C MFR BLD HPLC: 6.4 % (ref 4–5.6)
HDLC SERPL-MCNC: 48 MG/DL (ref 40–75)
HDLC SERPL: 19.7 % (ref 20–50)
HGB UR QL STRIP: NEGATIVE
KETONES UR QL STRIP: NEGATIVE
LDLC SERPL CALC-MCNC: 157.8 MG/DL (ref 63–159)
LEUKOCYTE ESTERASE UR QL STRIP: NEGATIVE
MICROALBUMIN UR DL<=1MG/L-MCNC: 5 UG/ML
NITRITE UR QL STRIP: NEGATIVE
NONHDLC SERPL-MCNC: 196 MG/DL
PH UR STRIP: 7 [PH] (ref 5–8)
POTASSIUM SERPL-SCNC: 3.2 MMOL/L (ref 3.5–5.1)
PROT SERPL-MCNC: 7.5 G/DL (ref 6–8.4)
PROT UR QL STRIP: NEGATIVE
SODIUM SERPL-SCNC: 140 MMOL/L (ref 136–145)
SP GR UR STRIP: 1.02 (ref 1–1.03)
TRIGL SERPL-MCNC: 191 MG/DL (ref 30–150)
URN SPEC COLLECT METH UR: NORMAL

## 2019-04-08 PROCEDURE — 99214 OFFICE O/P EST MOD 30 MIN: CPT | Mod: S$GLB,,, | Performed by: INTERNAL MEDICINE

## 2019-04-08 PROCEDURE — 99999 PR PBB SHADOW E&M-EST. PATIENT-LVL III: ICD-10-PCS | Mod: PBBFAC,,, | Performed by: INTERNAL MEDICINE

## 2019-04-08 PROCEDURE — 99214 PR OFFICE/OUTPT VISIT, EST, LEVL IV, 30-39 MIN: ICD-10-PCS | Mod: S$GLB,,, | Performed by: INTERNAL MEDICINE

## 2019-04-08 PROCEDURE — 3044F PR MOST RECENT HEMOGLOBIN A1C LEVEL <7.0%: ICD-10-PCS | Mod: CPTII,S$GLB,, | Performed by: INTERNAL MEDICINE

## 2019-04-08 PROCEDURE — 3008F PR BODY MASS INDEX (BMI) DOCUMENTED: ICD-10-PCS | Mod: CPTII,S$GLB,, | Performed by: INTERNAL MEDICINE

## 2019-04-08 PROCEDURE — 3074F SYST BP LT 130 MM HG: CPT | Mod: CPTII,S$GLB,, | Performed by: INTERNAL MEDICINE

## 2019-04-08 PROCEDURE — 82043 UR ALBUMIN QUANTITATIVE: CPT

## 2019-04-08 PROCEDURE — 3079F DIAST BP 80-89 MM HG: CPT | Mod: CPTII,S$GLB,, | Performed by: INTERNAL MEDICINE

## 2019-04-08 PROCEDURE — 80053 COMPREHEN METABOLIC PANEL: CPT

## 2019-04-08 PROCEDURE — 99999 PR PBB SHADOW E&M-EST. PATIENT-LVL III: CPT | Mod: PBBFAC,,, | Performed by: INTERNAL MEDICINE

## 2019-04-08 PROCEDURE — 83036 HEMOGLOBIN GLYCOSYLATED A1C: CPT

## 2019-04-08 PROCEDURE — 81003 URINALYSIS AUTO W/O SCOPE: CPT

## 2019-04-08 PROCEDURE — 3044F HG A1C LEVEL LT 7.0%: CPT | Mod: CPTII,S$GLB,, | Performed by: INTERNAL MEDICINE

## 2019-04-08 PROCEDURE — 3079F PR MOST RECENT DIASTOLIC BLOOD PRESSURE 80-89 MM HG: ICD-10-PCS | Mod: CPTII,S$GLB,, | Performed by: INTERNAL MEDICINE

## 2019-04-08 PROCEDURE — 3008F BODY MASS INDEX DOCD: CPT | Mod: CPTII,S$GLB,, | Performed by: INTERNAL MEDICINE

## 2019-04-08 PROCEDURE — 80061 LIPID PANEL: CPT

## 2019-04-08 PROCEDURE — 36415 COLL VENOUS BLD VENIPUNCTURE: CPT

## 2019-04-08 PROCEDURE — 3074F PR MOST RECENT SYSTOLIC BLOOD PRESSURE < 130 MM HG: ICD-10-PCS | Mod: CPTII,S$GLB,, | Performed by: INTERNAL MEDICINE

## 2019-04-08 RX ORDER — METFORMIN HYDROCHLORIDE 500 MG/1
500 TABLET, EXTENDED RELEASE ORAL
Qty: 90 TABLET | Refills: 1 | Status: SHIPPED | OUTPATIENT
Start: 2019-04-08 | End: 2019-08-04 | Stop reason: SDUPTHER

## 2019-04-08 NOTE — PROGRESS NOTES
Pt O2 up to 99% on 4L via [address], RN  03/19/18 Wesley Farris Subjective:       Patient ID: Leydi Quintero is a 53 y.o. female.    Chief Complaint: Follow-up (3 month)    Leydi Quintero  53 y.o. Black or  female    Patient presents with:  Follow-up: 3 month    HPI: Here today to follow up on chronic conditions.  HTN--stable on chlorthalidone. She denies symptoms.   Diabetes--compliant with metformin. She denies symptoms.                     HGBA1C                   6.6 (H)             11/08/2018            HLD--compliant with pravastatin.                CHOL                     306 (H)             11/08/2018                 HDL                      48                  11/08/2018                 LDLCALC                  218.0 (H)           11/08/2018                 TRIG                     200 (H)             11/08/2018            She has been urinating a lot. She states when she has to go, she has to go. This has been going on for a while. She is taking a diuretic.       Past Medical History:  Allergic rhinitis  Anxiety  Asthma  Diabetes mellitus, type 2  Hyperlipidemia  Hypertension  Sarcoidosis    Current Outpatient Medications on File Prior to Visit:  albuterol-ipratropium (DUO-NEB) 2.5 mg-0.5 mg/3 mL nebulizer solution, Take 3 mLs by nebulization every 6 (six) hours as needed for Wheezing. Rescue, Disp: 1 Box, Rfl: 3  blood sugar diagnostic Strp, Once daily glucose testing., Disp: 50 strip, Rfl: 6  blood-glucose meter Misc, Use as directed., Disp: 1 each, Rfl: 0  chlorthalidone (HYGROTEN) 25 MG Tab, TAKE 1 TABLET(25 MG) BY MOUTH EVERY DAY, Disp: 90 tablet, Rfl: 1  fluticasone (FLONASE) 50 mcg/actuation nasal spray, 1 spray by Each Nare route 2 (two) times daily., Disp: 16 g, Rfl: 3  ipratropium (ATROVENT) 0.02 % nebulizer solution, Take 2.5 mLs (500 mcg total) by nebulization once. Rescue for 1 dose, Disp: 1 vial, Rfl: 0  lancets (LANCETS,THIN) Misc, Once daily glucose testing., Disp: 50 each, Rfl: 6  pravastatin (PRAVACHOL) 80 MG tablet,  TAKE 1 TABLET(80 MG) BY MOUTH EVERY EVENING, Disp: 90 tablet, Rfl: 1  metFORMIN (GLUCOPHAGE-XR) 500 MG 24 hr tablet, Take 1 tablet (500 mg total) by mouth daily with breakfast., Disp: 90 tablet, Rfl: 1    Allergies:  Review of patient's allergies indicates:   -- Lipitor (atorvastatin)     --  Other reaction(s): Muscle pain      Review of Systems   Constitutional: Negative for fever and unexpected weight change.   Eyes: Negative for visual disturbance.   Respiratory: Negative for shortness of breath.    Cardiovascular: Negative for chest pain.   Gastrointestinal: Negative for abdominal pain.   Genitourinary: Positive for frequency and urgency. Negative for dysuria.   Musculoskeletal: Negative for gait problem.   Neurological: Negative for dizziness, numbness and headaches.   Psychiatric/Behavioral: The patient is nervous/anxious.        Objective:      Physical Exam   Constitutional: She is oriented to person, place, and time. She appears well-developed and well-nourished. No distress.   Eyes: No scleral icterus.   Neck: No tracheal deviation present.   Cardiovascular: Normal rate, regular rhythm and normal heart sounds.   Pulmonary/Chest: Effort normal and breath sounds normal. No respiratory distress. She has no wheezes. She has no rales.   Abdominal: Soft. Bowel sounds are normal.   Neurological: She is alert and oriented to person, place, and time.   Skin: Skin is warm and dry.   Psychiatric: She has a normal mood and affect.   Vitals reviewed.      Assessment:       1. Hypertension goal BP (blood pressure) < 130/80    2. Controlled type 2 diabetes mellitus without complication, without long-term current use of insulin    3. Hyperlipidemia LDL goal <70    4. Urinary frequency    5. Severe obesity with body mass index (BMI) of 35.0 to 39.9 with comorbidity        Plan:       Leydi was seen today for follow-up.    Diagnoses and all orders for this visit:    Hypertension goal BP (blood pressure) < 130/80  -      Stable  -     Continue to monitor    Controlled type 2 diabetes mellitus without complication, without long-term current use of insulin  -     Refill metFORMIN (GLUCOPHAGE-XR) 500 MG 24 hr tablet; Take 1 tablet (500 mg total) by mouth daily with breakfast.  -     Microalbumin/creatinine urine ratio    Hyperlipidemia LDL goal <70  -     Lipid panel pending    Urinary frequency  -     Urinalysis  -     May need chlorthalidone adjusted or changed to an alternative     Severe obesity with body mass index (BMI) of 35.0 to 39.9 with comorbidity  -     Lifestyle modifications discussed    Labs today.     F/U in 3 months and as needed.

## 2019-05-03 ENCOUNTER — OFFICE VISIT (OUTPATIENT)
Dept: INTERNAL MEDICINE | Facility: CLINIC | Age: 54
End: 2019-05-03
Payer: COMMERCIAL

## 2019-05-03 VITALS
BODY MASS INDEX: 38.74 KG/M2 | WEIGHT: 232.81 LBS | SYSTOLIC BLOOD PRESSURE: 120 MMHG | HEART RATE: 70 BPM | DIASTOLIC BLOOD PRESSURE: 91 MMHG | TEMPERATURE: 97 F | OXYGEN SATURATION: 98 %

## 2019-05-03 DIAGNOSIS — F41.9 ANXIETY: ICD-10-CM

## 2019-05-03 DIAGNOSIS — J01.00 ACUTE NON-RECURRENT MAXILLARY SINUSITIS: Primary | ICD-10-CM

## 2019-05-03 DIAGNOSIS — B37.9 ANTIBIOTIC-INDUCED YEAST INFECTION: ICD-10-CM

## 2019-05-03 DIAGNOSIS — T36.95XA ANTIBIOTIC-INDUCED YEAST INFECTION: ICD-10-CM

## 2019-05-03 PROCEDURE — 3080F DIAST BP >= 90 MM HG: CPT | Mod: CPTII,S$GLB,, | Performed by: FAMILY MEDICINE

## 2019-05-03 PROCEDURE — 3008F BODY MASS INDEX DOCD: CPT | Mod: CPTII,S$GLB,, | Performed by: FAMILY MEDICINE

## 2019-05-03 PROCEDURE — 99214 PR OFFICE/OUTPT VISIT, EST, LEVL IV, 30-39 MIN: ICD-10-PCS | Mod: S$GLB,,, | Performed by: FAMILY MEDICINE

## 2019-05-03 PROCEDURE — 99999 PR PBB SHADOW E&M-EST. PATIENT-LVL III: CPT | Mod: PBBFAC,,, | Performed by: FAMILY MEDICINE

## 2019-05-03 PROCEDURE — 99214 OFFICE O/P EST MOD 30 MIN: CPT | Mod: S$GLB,,, | Performed by: FAMILY MEDICINE

## 2019-05-03 PROCEDURE — 3074F PR MOST RECENT SYSTOLIC BLOOD PRESSURE < 130 MM HG: ICD-10-PCS | Mod: CPTII,S$GLB,, | Performed by: FAMILY MEDICINE

## 2019-05-03 PROCEDURE — 3008F PR BODY MASS INDEX (BMI) DOCUMENTED: ICD-10-PCS | Mod: CPTII,S$GLB,, | Performed by: FAMILY MEDICINE

## 2019-05-03 PROCEDURE — 3080F PR MOST RECENT DIASTOLIC BLOOD PRESSURE >= 90 MM HG: ICD-10-PCS | Mod: CPTII,S$GLB,, | Performed by: FAMILY MEDICINE

## 2019-05-03 PROCEDURE — 3074F SYST BP LT 130 MM HG: CPT | Mod: CPTII,S$GLB,, | Performed by: FAMILY MEDICINE

## 2019-05-03 PROCEDURE — 99999 PR PBB SHADOW E&M-EST. PATIENT-LVL III: ICD-10-PCS | Mod: PBBFAC,,, | Performed by: FAMILY MEDICINE

## 2019-05-03 RX ORDER — BUPROPION HYDROCHLORIDE 75 MG/1
75 TABLET ORAL 2 TIMES DAILY
Qty: 30 TABLET | Refills: 1 | Status: SHIPPED | OUTPATIENT
Start: 2019-05-03 | End: 2019-08-29

## 2019-05-03 RX ORDER — FLUCONAZOLE 150 MG/1
150 TABLET ORAL DAILY
Qty: 3 TABLET | Refills: 0 | Status: SHIPPED | OUTPATIENT
Start: 2019-05-03 | End: 2019-05-06

## 2019-05-03 RX ORDER — FLUCONAZOLE 150 MG/1
150 TABLET ORAL DAILY
Qty: 1 TABLET | Refills: 0 | Status: SHIPPED | OUTPATIENT
Start: 2019-05-03 | End: 2019-05-03 | Stop reason: SDUPTHER

## 2019-05-03 RX ORDER — AMOXICILLIN 875 MG/1
875 TABLET, FILM COATED ORAL 2 TIMES DAILY
Qty: 20 TABLET | Refills: 0 | Status: SHIPPED | OUTPATIENT
Start: 2019-05-03 | End: 2019-05-13

## 2019-05-03 NOTE — PROGRESS NOTES
Subjective:       Patient ID: Leydi Quintero is a 53 y.o. female.    Chief Complaint: Sinusitis and Vaginitis    HPI Ms. Quintero presents today for sinus problems.   2 weeks.   Pressure in the face and nasal congestion.   Eyes crusty and red.   She has tried coricidin and has not gotten relief.     She also has vaginal discharge. Clear discharge and itching.   No odor     Has a lot of stressors happening at once right now.   She has taken wellbutrin as needed in the past.   She would like to try this again.    Review of Systems   Constitutional: Negative for activity change, appetite change, fatigue and fever.   HENT: Positive for congestion and sinus pressure. Negative for ear pain.    Eyes: Negative for pain and visual disturbance.   Respiratory: Negative for cough, chest tightness and wheezing.    Cardiovascular: Negative for chest pain, palpitations and leg swelling.   Gastrointestinal: Negative for abdominal distention, abdominal pain, constipation, diarrhea, nausea and vomiting.   Endocrine: Negative for polydipsia and polyuria.   Genitourinary: Positive for vaginal discharge. Negative for difficulty urinating, dyspareunia, dysuria, flank pain and hematuria.   Musculoskeletal: Negative for arthralgias and back pain.   Skin: Negative for rash.   Neurological: Negative for dizziness, tremors, syncope, weakness, numbness and headaches.   Psychiatric/Behavioral: Positive for decreased concentration and dysphoric mood. Negative for agitation and confusion.         Past Medical History:   Diagnosis Date    Allergic rhinitis     Anxiety     Asthma     Diabetes mellitus, type 2     Hyperlipidemia     Hypertension     Sarcoidosis      Past Surgical History:   Procedure Laterality Date    FOOT SURGERY  at 9y/o    arch lift     Objective:        Physical Exam   Constitutional: She is oriented to person, place, and time. She appears well-developed and well-nourished.   HENT:   Head: Normocephalic and  atraumatic.   Nose: Right sinus exhibits maxillary sinus tenderness. Right sinus exhibits no frontal sinus tenderness. Left sinus exhibits maxillary sinus tenderness. Left sinus exhibits no frontal sinus tenderness.   Eyes: EOM are normal. Right conjunctiva is injected. Left conjunctiva is injected. Left conjunctiva has no hemorrhage.   Cardiovascular: Normal heart sounds.   Pulmonary/Chest: Breath sounds normal.   Neurological: She is alert and oriented to person, place, and time.         Assessment/Plan:     Acute non-recurrent maxillary sinusitis  -     amoxicillin (AMOXIL) 875 MG tablet; Take 1 tablet (875 mg total) by mouth 2 (two) times daily. for 10 days  Dispense: 20 tablet; Refill: 0    Antibiotic-induced yeast infection  -     Discontinue: fluconazole (DIFLUCAN) 150 MG Tab; Take 1 tablet (150 mg total) by mouth once daily. for 1 day  Dispense: 1 tablet; Refill: 0  -     fluconazole (DIFLUCAN) 150 MG Tab; Take 1 tablet (150 mg total) by mouth once daily. for 3 doses  Dispense: 3 tablet; Refill: 0    Anxiety  -     buPROPion (WELLBUTRIN) 75 MG tablet; Take 1 tablet (75 mg total) by mouth 2 (two) times daily.  Dispense: 30 tablet; Refill: 1      Follow up if symptoms worsen or fail to improve.    Georgia Cortez MD  [facility]   Family Medicine

## 2019-06-19 DIAGNOSIS — I10 HYPERTENSION GOAL BP (BLOOD PRESSURE) < 140/90: ICD-10-CM

## 2019-06-19 DIAGNOSIS — I10 HYPERTENSION GOAL BP (BLOOD PRESSURE) < 130/80: Chronic | ICD-10-CM

## 2019-06-19 RX ORDER — CHLORTHALIDONE 25 MG/1
TABLET ORAL
Qty: 90 TABLET | Refills: 0 | Status: SHIPPED | OUTPATIENT
Start: 2019-06-19 | End: 2019-06-20 | Stop reason: SDUPTHER

## 2019-06-20 RX ORDER — CHLORTHALIDONE 25 MG/1
TABLET ORAL
Qty: 90 TABLET | Refills: 0 | Status: SHIPPED | OUTPATIENT
Start: 2019-06-20 | End: 2019-08-05 | Stop reason: SDUPTHER

## 2019-07-16 DIAGNOSIS — E11.65 TYPE 2 DIABETES MELLITUS WITH HYPERGLYCEMIA, WITHOUT LONG-TERM CURRENT USE OF INSULIN: ICD-10-CM

## 2019-07-17 RX ORDER — DIPHENHYDRAMINE HCL 25 MG
TABLET ORAL
Qty: 1 EACH | Refills: 0 | Status: SHIPPED | OUTPATIENT
Start: 2019-07-17

## 2019-07-31 ENCOUNTER — TELEPHONE (OUTPATIENT)
Dept: INTERNAL MEDICINE | Facility: CLINIC | Age: 54
End: 2019-07-31

## 2019-07-31 DIAGNOSIS — I15.2 HYPERTENSION ASSOCIATED WITH DIABETES: Primary | ICD-10-CM

## 2019-07-31 DIAGNOSIS — E11.59 HYPERTENSION ASSOCIATED WITH DIABETES: Primary | ICD-10-CM

## 2019-08-04 DIAGNOSIS — I10 HYPERTENSION GOAL BP (BLOOD PRESSURE) < 130/80: Chronic | ICD-10-CM

## 2019-08-04 DIAGNOSIS — E78.5 HYPERLIPIDEMIA LDL GOAL <70: Chronic | ICD-10-CM

## 2019-08-04 DIAGNOSIS — E11.9 CONTROLLED TYPE 2 DIABETES MELLITUS WITHOUT COMPLICATION, WITHOUT LONG-TERM CURRENT USE OF INSULIN: ICD-10-CM

## 2019-08-05 RX ORDER — METFORMIN HYDROCHLORIDE 500 MG/1
TABLET, EXTENDED RELEASE ORAL
Qty: 90 TABLET | Refills: 1 | Status: SHIPPED | OUTPATIENT
Start: 2019-08-05 | End: 2019-08-29

## 2019-08-05 RX ORDER — CHLORTHALIDONE 25 MG/1
TABLET ORAL
Qty: 90 TABLET | Refills: 1 | Status: SHIPPED | OUTPATIENT
Start: 2019-08-05 | End: 2020-03-12

## 2019-08-05 RX ORDER — PRAVASTATIN SODIUM 80 MG/1
TABLET ORAL
Qty: 90 TABLET | Refills: 1 | Status: SHIPPED | OUTPATIENT
Start: 2019-08-05 | End: 2019-12-06

## 2019-08-26 ENCOUNTER — LAB VISIT (OUTPATIENT)
Dept: LAB | Facility: HOSPITAL | Age: 54
End: 2019-08-26
Attending: INTERNAL MEDICINE
Payer: COMMERCIAL

## 2019-08-26 DIAGNOSIS — E11.59 HYPERTENSION ASSOCIATED WITH DIABETES: ICD-10-CM

## 2019-08-26 DIAGNOSIS — I15.2 HYPERTENSION ASSOCIATED WITH DIABETES: ICD-10-CM

## 2019-08-26 LAB
ALBUMIN SERPL BCP-MCNC: 3.8 G/DL (ref 3.5–5.2)
ALP SERPL-CCNC: 82 U/L (ref 55–135)
ALT SERPL W/O P-5'-P-CCNC: 13 U/L (ref 10–44)
ANION GAP SERPL CALC-SCNC: 11 MMOL/L (ref 8–16)
AST SERPL-CCNC: 14 U/L (ref 10–40)
BILIRUB SERPL-MCNC: 0.4 MG/DL (ref 0.1–1)
BUN SERPL-MCNC: 12 MG/DL (ref 6–20)
CALCIUM SERPL-MCNC: 9.8 MG/DL (ref 8.7–10.5)
CHLORIDE SERPL-SCNC: 102 MMOL/L (ref 95–110)
CHOLEST SERPL-MCNC: 272 MG/DL (ref 120–199)
CHOLEST/HDLC SERPL: 5.4 {RATIO} (ref 2–5)
CO2 SERPL-SCNC: 27 MMOL/L (ref 23–29)
CREAT SERPL-MCNC: 1.1 MG/DL (ref 0.5–1.4)
EST. GFR  (AFRICAN AMERICAN): >60 ML/MIN/1.73 M^2
EST. GFR  (NON AFRICAN AMERICAN): 56.7 ML/MIN/1.73 M^2
ESTIMATED AVG GLUCOSE: 166 MG/DL (ref 68–131)
GLUCOSE SERPL-MCNC: 171 MG/DL (ref 70–110)
HBA1C MFR BLD HPLC: 7.4 % (ref 4–5.6)
HDLC SERPL-MCNC: 50 MG/DL (ref 40–75)
HDLC SERPL: 18.4 % (ref 20–50)
LDLC SERPL CALC-MCNC: 177.4 MG/DL (ref 63–159)
NONHDLC SERPL-MCNC: 222 MG/DL
POTASSIUM SERPL-SCNC: 3.4 MMOL/L (ref 3.5–5.1)
PROT SERPL-MCNC: 7.7 G/DL (ref 6–8.4)
SODIUM SERPL-SCNC: 140 MMOL/L (ref 136–145)
TRIGL SERPL-MCNC: 223 MG/DL (ref 30–150)

## 2019-08-26 PROCEDURE — 80061 LIPID PANEL: CPT

## 2019-08-26 PROCEDURE — 36415 COLL VENOUS BLD VENIPUNCTURE: CPT

## 2019-08-26 PROCEDURE — 80053 COMPREHEN METABOLIC PANEL: CPT

## 2019-08-26 PROCEDURE — 83036 HEMOGLOBIN GLYCOSYLATED A1C: CPT

## 2019-08-29 ENCOUNTER — OFFICE VISIT (OUTPATIENT)
Dept: INTERNAL MEDICINE | Facility: CLINIC | Age: 54
End: 2019-08-29
Payer: COMMERCIAL

## 2019-08-29 VITALS
WEIGHT: 240.31 LBS | SYSTOLIC BLOOD PRESSURE: 122 MMHG | DIASTOLIC BLOOD PRESSURE: 82 MMHG | BODY MASS INDEX: 40.04 KG/M2 | TEMPERATURE: 96 F | HEART RATE: 70 BPM | HEIGHT: 65 IN | OXYGEN SATURATION: 98 %

## 2019-08-29 DIAGNOSIS — E78.5 HYPERLIPIDEMIA LDL GOAL <70: Chronic | ICD-10-CM

## 2019-08-29 DIAGNOSIS — I10 HYPERTENSION GOAL BP (BLOOD PRESSURE) < 130/80: Chronic | ICD-10-CM

## 2019-08-29 DIAGNOSIS — E66.01 SEVERE OBESITY (BMI 35.0-39.9) WITH COMORBIDITY: ICD-10-CM

## 2019-08-29 DIAGNOSIS — J45.20 MILD INTERMITTENT ASTHMA WITHOUT COMPLICATION: ICD-10-CM

## 2019-08-29 PROCEDURE — 99999 PR PBB SHADOW E&M-EST. PATIENT-LVL III: ICD-10-PCS | Mod: PBBFAC,,, | Performed by: INTERNAL MEDICINE

## 2019-08-29 PROCEDURE — 3045F PR MOST RECENT HEMOGLOBIN A1C LEVEL 7.0-9.0%: ICD-10-PCS | Mod: CPTII,S$GLB,, | Performed by: INTERNAL MEDICINE

## 2019-08-29 PROCEDURE — 3008F PR BODY MASS INDEX (BMI) DOCUMENTED: ICD-10-PCS | Mod: CPTII,S$GLB,, | Performed by: INTERNAL MEDICINE

## 2019-08-29 PROCEDURE — 3045F PR MOST RECENT HEMOGLOBIN A1C LEVEL 7.0-9.0%: CPT | Mod: CPTII,S$GLB,, | Performed by: INTERNAL MEDICINE

## 2019-08-29 PROCEDURE — 3079F DIAST BP 80-89 MM HG: CPT | Mod: CPTII,S$GLB,, | Performed by: INTERNAL MEDICINE

## 2019-08-29 PROCEDURE — 3079F PR MOST RECENT DIASTOLIC BLOOD PRESSURE 80-89 MM HG: ICD-10-PCS | Mod: CPTII,S$GLB,, | Performed by: INTERNAL MEDICINE

## 2019-08-29 PROCEDURE — 99214 PR OFFICE/OUTPT VISIT, EST, LEVL IV, 30-39 MIN: ICD-10-PCS | Mod: S$GLB,,, | Performed by: INTERNAL MEDICINE

## 2019-08-29 PROCEDURE — 3008F BODY MASS INDEX DOCD: CPT | Mod: CPTII,S$GLB,, | Performed by: INTERNAL MEDICINE

## 2019-08-29 PROCEDURE — 3074F SYST BP LT 130 MM HG: CPT | Mod: CPTII,S$GLB,, | Performed by: INTERNAL MEDICINE

## 2019-08-29 PROCEDURE — 99214 OFFICE O/P EST MOD 30 MIN: CPT | Mod: S$GLB,,, | Performed by: INTERNAL MEDICINE

## 2019-08-29 PROCEDURE — 3074F PR MOST RECENT SYSTOLIC BLOOD PRESSURE < 130 MM HG: ICD-10-PCS | Mod: CPTII,S$GLB,, | Performed by: INTERNAL MEDICINE

## 2019-08-29 PROCEDURE — 99999 PR PBB SHADOW E&M-EST. PATIENT-LVL III: CPT | Mod: PBBFAC,,, | Performed by: INTERNAL MEDICINE

## 2019-08-29 RX ORDER — DOXYCYCLINE HYCLATE 100 MG
TABLET ORAL
Refills: 0 | COMMUNITY
Start: 2019-07-16 | End: 2019-08-29

## 2019-08-29 RX ORDER — PROMETHAZINE HYDROCHLORIDE AND DEXTROMETHORPHAN HYDROBROMIDE 6.25; 15 MG/5ML; MG/5ML
SYRUP ORAL
Refills: 0 | COMMUNITY
Start: 2019-07-16 | End: 2021-09-16

## 2019-08-29 RX ORDER — METFORMIN HYDROCHLORIDE 500 MG/1
500 TABLET, EXTENDED RELEASE ORAL 2 TIMES DAILY WITH MEALS
Qty: 180 TABLET | Refills: 1 | Status: SHIPPED | OUTPATIENT
Start: 2019-08-29 | End: 2020-03-18

## 2019-08-29 RX ORDER — SODIUM, POTASSIUM,MAG SULFATES 17.5-3.13G
1 SOLUTION, RECONSTITUTED, ORAL ORAL
COMMUNITY
Start: 2014-06-10 | End: 2021-09-16

## 2019-08-29 RX ORDER — PREDNISONE 20 MG/1
TABLET ORAL
Refills: 0 | COMMUNITY
Start: 2019-07-16 | End: 2019-08-29

## 2019-08-29 RX ORDER — FUROSEMIDE 20 MG/1
20 TABLET ORAL
COMMUNITY
End: 2019-08-29

## 2019-08-29 NOTE — PROGRESS NOTES
Subjective:       Patient ID: Leydi Quintero is a 53 y.o. female.    Chief Complaint: Follow-up    Leydi Quintero  53 y.o. Black or  female    Patient presents with:  Follow-up    HPI: Here today to follow up on chronic conditions.  Diabetes--worsened. She is not at goal. She admits to diet and exercise non compliance. She is taking metformin daily.                    HGBA1C                   7.4 (H)             08/26/2019            HLD--uncontrolled. She admits to diet, exercise and medication non compliance.                       CHOL                     272 (H)             08/26/2019                 HDL                      50                  08/26/2019                 LDLCALC                  177.4 (H)           08/26/2019                 TRIG                     223 (H)             08/26/2019            HTN--stable on chlorthalidone.      Asthma--stable. She reports having a flare a couple of months ago that required medical attention. She was placed on steroids and has not had any flares since then.     Past Medical History:  Allergic rhinitis  Anxiety  Asthma  Diabetes mellitus, type 2  Hyperlipidemia  Hypertension  Sarcoidosis    Current Outpatient Medications on File Prior to Visit:  albuterol-ipratropium (DUO-NEB) 2.5 mg-0.5 mg/3 mL nebulizer solution, Take 3 mLs by nebulization every 6 (six) hours as needed for Wheezing. Rescue, Disp: 1 Box, Rfl: 3  blood sugar diagnostic Strp, Once daily glucose testing., Disp: 50 strip, Rfl: 6  chlorthalidone (HYGROTEN) 25 MG Tab, TAKE 1 TABLET(25 MG) BY MOUTH EVERY DAY, Disp: 90 tablet, Rfl: 1  fluticasone (FLONASE) 50 mcg/actuation nasal spray, 1 spray by Each Nare route 2 (two) times daily., Disp: 16 g, Rfl: 3  ipratropium (ATROVENT) 0.02 % nebulizer solution, Take 2.5 mLs (500 mcg total) by nebulization once. Rescue for 1 dose, Disp: 1 vial, Rfl: 0  lancets (LANCETS,THIN) Misc, Once daily glucose testing., Disp: 50 each, Rfl:  6  pravastatin (PRAVACHOL) 80 MG tablet, TAKE 1 TABLET(80 MG) BY MOUTH EVERY EVENING, Disp: 90 tablet, Rfl: 1  TRUE METRIX AIR GLUCOSE METER [facility], USE AS DIRECTED TO TEST BLOOD SUGAR ONCE DAILY, Disp: 1 each, Rfl: 0  metFORMIN (GLUCOPHAGE-XR) 500 MG 24 hr tablet, TAKE 1 TABLET BY MOUTH DAILY WITH BREAKFAST, Disp: 90 tablet, Rfl: 1  promethazine-dextromethorphan (PROMETHAZINE-DM) 6.25-15 mg/5 mL Syrp, TK   2 TEA PO Q 6 H PRF COUGH, Disp: , Rfl: 0  sodium,potassium,mag sulfates (SUPREP BOWEL PREP KIT) 17.5-3.13-1.6 gram SolR, Take 1 Bottle by mouth., Disp: , Rfl:     Allergies:  Review of patient's allergies indicates:   -- Lipitor (atorvastatin)     --  Other reaction(s): Muscle pain          Review of Systems   Constitutional: Negative for fever and unexpected weight change.   Respiratory: Negative for cough, shortness of breath and wheezing.    Cardiovascular: Negative for chest pain and leg swelling.   Genitourinary: Positive for frequency.   Musculoskeletal: Negative for gait problem.   Neurological: Negative for dizziness and headaches.       Objective:      Physical Exam   Constitutional: She is oriented to person, place, and time. She appears well-developed and well-nourished. No distress.   Eyes: No scleral icterus.   Neck: No tracheal deviation present.   Cardiovascular: Normal rate, regular rhythm and normal heart sounds.   Pulmonary/Chest: Effort normal and breath sounds normal. No respiratory distress. She has no wheezes. She has no rales.   Abdominal: Soft. Bowel sounds are normal.   Musculoskeletal: She exhibits no edema.   Neurological: She is alert and oriented to person, place, and time.   Skin: Skin is warm and dry.   Psychiatric: She has a normal mood and affect.   Vitals reviewed.      Assessment:       1. Uncontrolled type 2 diabetes mellitus without complication, without long-term current use of insulin    2. Hyperlipidemia LDL goal <70    3. Hypertension goal BP (blood pressure) < 130/80    4.  Mild intermittent asthma without complication    5. Severe obesity (BMI 35.0-39.9) with comorbidity        Plan:       Leydi was seen today for follow-up.    Diagnoses and all orders for this visit:    Uncontrolled type 2 diabetes mellitus without complication, without long-term current use of insulin  -     Change metFORMIN (GLUCOPHAGE-XR) 500 MG 24 hr tablet; Take 1 tablet (500 mg total) by mouth 2 (two) times daily with meals.  -     Lifestyle modifications discussed    Hyperlipidemia LDL goal <70  -     Lifestyle modifications discussed  -     Counseled regarding medication compliance  -     Discussed the consequences of uncontrolled lipids including cardiovascular disease/events    Hypertension goal BP (blood pressure) < 130/80  -     Continue chlorthalidone    Mild intermittent asthma without complication  -     Continue to monitor    Severe obesity (BMI 35.0-39.9) with comorbidity  -     Lifestyle modifications discussed    Labs and f/u in 3 months.

## 2019-09-10 ENCOUNTER — TELEPHONE (OUTPATIENT)
Dept: OBSTETRICS AND GYNECOLOGY | Facility: CLINIC | Age: 54
End: 2019-09-10

## 2019-09-10 DIAGNOSIS — Z12.31 BREAST CANCER SCREENING BY MAMMOGRAM: Primary | ICD-10-CM

## 2019-09-10 NOTE — TELEPHONE ENCOUNTER
----- Message from Elizabeth Ramires sent at 9/10/2019  3:42 PM CDT -----  Contact: self  needs to schedule next month for brent lozano order needed...[phone] (home)

## 2019-09-10 NOTE — TELEPHONE ENCOUNTER
----- Message from Elizabeth Ramires sent at 9/10/2019  3:42 PM CDT -----  Contact: self  needs to schedule next month for brent lozano order needed...[phone] (home)

## 2019-09-10 NOTE — TELEPHONE ENCOUNTER
Returned call to patient.  She requested a [facility]we and mammo on the same day with Dr. Ralph or Dr. Stewart.  Both Appts scheduled 11/11/19, she confirmed both appts.

## 2019-11-11 ENCOUNTER — HOSPITAL ENCOUNTER (OUTPATIENT)
Dept: RADIOLOGY | Facility: HOSPITAL | Age: 54
Discharge: HOME | End: 2019-11-11
Attending: OBSTETRICS & GYNECOLOGY
Payer: COMMERCIAL

## 2019-11-11 ENCOUNTER — OFFICE VISIT (OUTPATIENT)
Dept: OBSTETRICS AND GYNECOLOGY | Facility: CLINIC | Age: 54
End: 2019-11-11
Payer: COMMERCIAL

## 2019-11-11 VITALS
BODY MASS INDEX: 41.03 KG/M2 | WEIGHT: 246.25 LBS | DIASTOLIC BLOOD PRESSURE: 76 MMHG | SYSTOLIC BLOOD PRESSURE: 130 MMHG | HEIGHT: 65 IN

## 2019-11-11 VITALS — HEIGHT: 65 IN | WEIGHT: 246.25 LBS | BODY MASS INDEX: 41.03 KG/M2

## 2019-11-11 DIAGNOSIS — Z12.31 BREAST CANCER SCREENING BY MAMMOGRAM: ICD-10-CM

## 2019-11-11 DIAGNOSIS — Z01.419 ENCOUNTER FOR GYNECOLOGICAL EXAMINATION WITHOUT ABNORMAL FINDING: Primary | ICD-10-CM

## 2019-11-11 PROCEDURE — 77063 BREAST TOMOSYNTHESIS BI: CPT | Mod: 26,,, | Performed by: RADIOLOGY

## 2019-11-11 PROCEDURE — 3075F PR MOST RECENT SYSTOLIC BLOOD PRESS GE 130-139MM HG: ICD-10-PCS | Mod: CPTII,S$GLB,, | Performed by: OBSTETRICS & GYNECOLOGY

## 2019-11-11 PROCEDURE — 99999 PR PBB SHADOW E&M-EST. PATIENT-LVL III: CPT | Mod: PBBFAC,,, | Performed by: OBSTETRICS & GYNECOLOGY

## 2019-11-11 PROCEDURE — 99999 PR PBB SHADOW E&M-EST. PATIENT-LVL III: ICD-10-PCS | Mod: PBBFAC,,, | Performed by: OBSTETRICS & GYNECOLOGY

## 2019-11-11 PROCEDURE — 77067 SCR MAMMO BI INCL CAD: CPT | Mod: TC

## 2019-11-11 PROCEDURE — 3078F DIAST BP <80 MM HG: CPT | Mod: CPTII,S$GLB,, | Performed by: OBSTETRICS & GYNECOLOGY

## 2019-11-11 PROCEDURE — 3078F PR MOST RECENT DIASTOLIC BLOOD PRESSURE < 80 MM HG: ICD-10-PCS | Mod: CPTII,S$GLB,, | Performed by: OBSTETRICS & GYNECOLOGY

## 2019-11-11 PROCEDURE — 77063 MAMMO DIGITAL SCREENING BILAT WITH TOMOSYNTHESIS_CAD: ICD-10-PCS | Mod: 26,,, | Performed by: RADIOLOGY

## 2019-11-11 PROCEDURE — 77067 MAMMO DIGITAL SCREENING BILAT WITH TOMOSYNTHESIS_CAD: ICD-10-PCS | Mod: 26,,, | Performed by: RADIOLOGY

## 2019-11-11 PROCEDURE — 3075F SYST BP GE 130 - 139MM HG: CPT | Mod: CPTII,S$GLB,, | Performed by: OBSTETRICS & GYNECOLOGY

## 2019-11-11 PROCEDURE — 77067 SCR MAMMO BI INCL CAD: CPT | Mod: 26,,, | Performed by: RADIOLOGY

## 2019-11-11 PROCEDURE — 99396 PREV VISIT EST AGE 40-64: CPT | Mod: S$GLB,,, | Performed by: OBSTETRICS & GYNECOLOGY

## 2019-11-11 PROCEDURE — 99396 PR PREVENTIVE VISIT,EST,40-64: ICD-10-PCS | Mod: S$GLB,,, | Performed by: OBSTETRICS & GYNECOLOGY

## 2019-11-11 PROCEDURE — 87624 HPV HI-RISK TYP POOLED RSLT: CPT

## 2019-11-11 PROCEDURE — 88175 CYTOPATH C/V AUTO FLUID REDO: CPT

## 2019-11-11 NOTE — PROGRESS NOTES
CC: Well woman exam    Leydi Quintero is a 53 y.o. female  presents for a well woman exam.  LMP: Patient's last menstrual period was 2013..  No issues, problems, or complaints. Diagnosed w/ DM last aditya    Past Medical History:   Diagnosis Date    Allergic rhinitis     Anxiety     Asthma     Diabetes mellitus, type 2     Hyperlipidemia     Hypertension     Sarcoidosis      Past Surgical History:   Procedure Laterality Date    FOOT SURGERY  at 9y/o    arch lift     Social History     Socioeconomic History    Marital status:      Spouse name: Not on file    Number of children: 1    Years of education: Not on file    Highest education level: Not on file   Occupational History     Employer: [employer]    Social Needs    Financial resource strain: Not on file    Food insecurity:     Worry: Not on file     Inability: Not on file    Transportation needs:     Medical: Not on file     Non-medical: Not on file   Tobacco Use    Smoking status: Never Smoker    Smokeless tobacco: Never Used   Substance and Sexual Activity    Alcohol use: No     Alcohol/week: 0.0 standard drinks    Drug use: No    Sexual activity: Yes     Partners: Male     Birth control/protection: Post-menopausal   Lifestyle    Physical activity:     Days per week: Not on file     Minutes per session: Not on file    Stress: Not on file   Relationships    Social connections:     Talks on phone: Not on file     Gets together: Not on file     Attends Confucianist service: Not on file     Active member of club or organization: Not on file     Attends meetings of clubs or organizations: Not on file     Relationship status: Not on file   Other Topics Concern    Not on file   Social History Narrative    Not on file     Family History   Problem Relation Age of Onset    Diabetes Mother     Hypertension Mother     Hypertension Father     Hyperlipidemia Father     Breast cancer Neg Hx     Colon cancer Neg  "Hx     Ovarian cancer Neg Hx      OB History        2    Para   1    Term   1            AB   1    Living   1       SAB   1    TAB        Ectopic        Multiple        Live Births                     Current Outpatient Medications:     albuterol-ipratropium (DUO-NEB) 2.5 mg-0.5 mg/3 mL nebulizer solution, Take 3 mLs by nebulization every 6 (six) hours as needed for Wheezing. Rescue, Disp: 1 Box, Rfl: 3    blood sugar diagnostic Strp, Once daily glucose testing., Disp: 50 strip, Rfl: 6    fluticasone (FLONASE) 50 mcg/actuation nasal spray, 1 spray by Each Nare route 2 (two) times daily., Disp: 16 g, Rfl: 3    lancets (LANCETS,THIN) Misc, Once daily glucose testing., Disp: 50 each, Rfl: 6    metFORMIN (GLUCOPHAGE-XR) 500 MG 24 hr tablet, Take 1 tablet (500 mg total) by mouth 2 (two) times daily with meals., Disp: 180 tablet, Rfl: 1    pravastatin (PRAVACHOL) 80 MG tablet, TAKE 1 TABLET(80 MG) BY MOUTH EVERY EVENING, Disp: 90 tablet, Rfl: 1    TRUE METRIX AIR GLUCOSE METER [facility], USE AS DIRECTED TO TEST BLOOD SUGAR ONCE DAILY, Disp: 1 each, Rfl: 0    chlorthalidone (HYGROTEN) 25 MG Tab, TAKE 1 TABLET(25 MG) BY MOUTH EVERY DAY (Patient not taking: Reported on 2019), Disp: 90 tablet, Rfl: 1    ipratropium (ATROVENT) 0.02 % nebulizer solution, Take 2.5 mLs (500 mcg total) by nebulization once. Rescue for 1 dose, Disp: 1 vial, Rfl: 0    promethazine-dextromethorphan (PROMETHAZINE-DM) 6.25-15 mg/5 mL Syrp, TK   2 TEA PO Q 6 H PRF COUGH, Disp: , Rfl: 0    sodium,potassium,mag sulfates (SUPREP BOWEL PREP KIT) 17.5-3.13-1.6 gram SolR, Take 1 Bottle by mouth., Disp: , Rfl:     GYNECOLOGY HISTORY:  No abnormal pap/std    DATA REVIEWED:  Last pap: normal Date:   Last mmg: normal Date: ; scheduled  Last colonoscopy: normal Date: ; pt will schedule w/ PCP    /76 (BP Location: Right arm)   Ht 5' 5" (1.651 m)   Wt 111.7 kg (246 lb 4.1 oz)   LMP 2013   BMI 40.98 kg/m² "     ROS:  GENERAL: Denies weight gain or weight loss. Feeling well overall.   SKIN: Denies rash or lesions.   HEAD: Denies head injury or headache.   NODES: Denies enlarged lymph nodes.   CHEST: Denies chest pain or shortness of breath.   CARDIOVASCULAR: Denies palpitations or left sided chest pain.   ABDOMEN: No abdominal pain, constipation, diarrhea, nausea, vomiting or rectal bleeding.   URINARY: No frequency, dysuria, hematuria, or burning on urination.  REPRODUCTIVE: See HPI.   BREASTS: The patient denies pain, lumps, or nipple discharge.   HEMATOLOGIC: No easy bruisability or excessive bleeding.   MUSCULOSKELETAL: Denies joint pain or swelling.   NEUROLOGIC: Denies syncope or weakness.   PSYCHIATRIC: Denies depression, anxiety or mood swings.    PHYSICAL EXAM:    APPEARANCE: Well nourished, well developed, in no acute distress.  AFFECT: WNL, alert and oriented x 3  SKIN: No acne or hirsutism  NECK: Neck symmetric without masses or thyromegaly  NODES: No inguinal, cervical, axillary, or femoral lymph node enlargement  CHEST: Good respiratory effect  ABDOMEN: Soft.  No tenderness or masses.  No hepatosplenomegaly.  No hernias.  BREASTS: Symmetrical, no skin changes or visible lesions.  No palpable masses, nipple discharge bilaterally.  PELVIC: Normal external genitalia without lesions.  Normal hair distribution.  Adequate perineal body, normal urethral meatus.  Vagina atrophic without lesions or discharge.  Cervix pink, without lesions, discharge or tenderness.  No significant cystocele or rectocele.  Bimanual exam shows uterus to be normal size, regular, mobile and nontender.  Adnexa without masses or tenderness.   EXTREMITIES: No edema.    Encounter for gynecological examination without abnormal finding  -     Liquid-Based Pap Smear, Screening  -     HPV High Risk Genotypes, PCR    Patient was counseled today on A.C.S. Pap guidelines (q5) and recommendations for yearly pelvic exams, yearly mammograms starting  age 40, and clinical breast exams; to see her PCP for other health maintenance.

## 2019-11-12 ENCOUNTER — PATIENT MESSAGE (OUTPATIENT)
Dept: OBSTETRICS AND GYNECOLOGY | Facility: HOSPITAL | Age: 54
End: 2019-11-12

## 2019-11-14 LAB
HPV HR 12 DNA SPEC QL NAA+PROBE: NEGATIVE
HPV16 AG SPEC QL: NEGATIVE
HPV18 DNA SPEC QL NAA+PROBE: NEGATIVE

## 2019-11-15 LAB
FINAL PATHOLOGIC DIAGNOSIS: NORMAL
Lab: NORMAL

## 2019-11-18 ENCOUNTER — PATIENT MESSAGE (OUTPATIENT)
Dept: OBSTETRICS AND GYNECOLOGY | Facility: CLINIC | Age: 54
End: 2019-11-18

## 2019-11-30 ENCOUNTER — LAB VISIT (OUTPATIENT)
Dept: LAB | Facility: HOSPITAL | Age: 54
End: 2019-11-30
Attending: INTERNAL MEDICINE
Payer: COMMERCIAL

## 2019-11-30 DIAGNOSIS — I15.2 HYPERTENSION ASSOCIATED WITH DIABETES: ICD-10-CM

## 2019-11-30 DIAGNOSIS — E11.59 HYPERTENSION ASSOCIATED WITH DIABETES: ICD-10-CM

## 2019-11-30 LAB
ALBUMIN SERPL BCP-MCNC: 3.7 G/DL (ref 3.5–5.2)
ALP SERPL-CCNC: 77 U/L (ref 55–135)
ALT SERPL W/O P-5'-P-CCNC: 16 U/L (ref 10–44)
ANION GAP SERPL CALC-SCNC: 8 MMOL/L (ref 8–16)
AST SERPL-CCNC: 14 U/L (ref 10–40)
BILIRUB SERPL-MCNC: 0.5 MG/DL (ref 0.1–1)
BUN SERPL-MCNC: 12 MG/DL (ref 6–20)
CALCIUM SERPL-MCNC: 9.7 MG/DL (ref 8.7–10.5)
CHLORIDE SERPL-SCNC: 104 MMOL/L (ref 95–110)
CHOLEST SERPL-MCNC: 254 MG/DL (ref 120–199)
CHOLEST/HDLC SERPL: 5 {RATIO} (ref 2–5)
CO2 SERPL-SCNC: 28 MMOL/L (ref 23–29)
CREAT SERPL-MCNC: 1 MG/DL (ref 0.5–1.4)
EST. GFR  (AFRICAN AMERICAN): >60 ML/MIN/1.73 M^2
EST. GFR  (NON AFRICAN AMERICAN): >60 ML/MIN/1.73 M^2
ESTIMATED AVG GLUCOSE: 140 MG/DL (ref 68–131)
GLUCOSE SERPL-MCNC: 127 MG/DL (ref 70–110)
HBA1C MFR BLD HPLC: 6.5 % (ref 4–5.6)
HDLC SERPL-MCNC: 51 MG/DL (ref 40–75)
HDLC SERPL: 20.1 % (ref 20–50)
LDLC SERPL CALC-MCNC: 162.8 MG/DL (ref 63–159)
NONHDLC SERPL-MCNC: 203 MG/DL
POTASSIUM SERPL-SCNC: 3.3 MMOL/L (ref 3.5–5.1)
PROT SERPL-MCNC: 7.5 G/DL (ref 6–8.4)
SODIUM SERPL-SCNC: 140 MMOL/L (ref 136–145)
TRIGL SERPL-MCNC: 201 MG/DL (ref 30–150)

## 2019-11-30 PROCEDURE — 80061 LIPID PANEL: CPT

## 2019-11-30 PROCEDURE — 36415 COLL VENOUS BLD VENIPUNCTURE: CPT

## 2019-11-30 PROCEDURE — 80053 COMPREHEN METABOLIC PANEL: CPT

## 2019-11-30 PROCEDURE — 83036 HEMOGLOBIN GLYCOSYLATED A1C: CPT

## 2019-12-06 ENCOUNTER — OFFICE VISIT (OUTPATIENT)
Dept: INTERNAL MEDICINE | Facility: CLINIC | Age: 54
End: 2019-12-06
Payer: COMMERCIAL

## 2019-12-06 VITALS
BODY MASS INDEX: 39.92 KG/M2 | HEIGHT: 65 IN | DIASTOLIC BLOOD PRESSURE: 82 MMHG | SYSTOLIC BLOOD PRESSURE: 124 MMHG | WEIGHT: 239.63 LBS | OXYGEN SATURATION: 99 % | TEMPERATURE: 96 F | HEART RATE: 72 BPM

## 2019-12-06 DIAGNOSIS — I10 HYPERTENSION GOAL BP (BLOOD PRESSURE) < 130/80: ICD-10-CM

## 2019-12-06 DIAGNOSIS — E66.01 SEVERE OBESITY (BMI 35.0-39.9) WITH COMORBIDITY: ICD-10-CM

## 2019-12-06 DIAGNOSIS — M54.42 CHRONIC BILATERAL LOW BACK PAIN WITH BILATERAL SCIATICA: ICD-10-CM

## 2019-12-06 DIAGNOSIS — M54.41 CHRONIC BILATERAL LOW BACK PAIN WITH BILATERAL SCIATICA: ICD-10-CM

## 2019-12-06 DIAGNOSIS — E11.9 CONTROLLED TYPE 2 DIABETES MELLITUS WITHOUT COMPLICATION, WITHOUT LONG-TERM CURRENT USE OF INSULIN: Primary | ICD-10-CM

## 2019-12-06 DIAGNOSIS — E78.5 HYPERLIPIDEMIA LDL GOAL <70: Chronic | ICD-10-CM

## 2019-12-06 DIAGNOSIS — G89.29 CHRONIC BILATERAL LOW BACK PAIN WITH BILATERAL SCIATICA: ICD-10-CM

## 2019-12-06 DIAGNOSIS — Z23 IMMUNIZATION DUE: ICD-10-CM

## 2019-12-06 PROCEDURE — 99999 PR PBB SHADOW E&M-EST. PATIENT-LVL IV: CPT | Mod: PBBFAC,,, | Performed by: INTERNAL MEDICINE

## 2019-12-06 PROCEDURE — 90686 FLU VACCINE (QUAD) GREATER THAN OR EQUAL TO 3YO PRESERVATIVE FREE IM: ICD-10-PCS | Mod: S$GLB,,, | Performed by: INTERNAL MEDICINE

## 2019-12-06 PROCEDURE — 90686 IIV4 VACC NO PRSV 0.5 ML IM: CPT | Mod: S$GLB,,, | Performed by: INTERNAL MEDICINE

## 2019-12-06 PROCEDURE — 99214 OFFICE O/P EST MOD 30 MIN: CPT | Mod: 25,S$GLB,, | Performed by: INTERNAL MEDICINE

## 2019-12-06 PROCEDURE — 90471 IMMUNIZATION ADMIN: CPT | Mod: S$GLB,,, | Performed by: INTERNAL MEDICINE

## 2019-12-06 PROCEDURE — 90471 FLU VACCINE (QUAD) GREATER THAN OR EQUAL TO 3YO PRESERVATIVE FREE IM: ICD-10-PCS | Mod: S$GLB,,, | Performed by: INTERNAL MEDICINE

## 2019-12-06 PROCEDURE — 3079F DIAST BP 80-89 MM HG: CPT | Mod: CPTII,S$GLB,, | Performed by: INTERNAL MEDICINE

## 2019-12-06 PROCEDURE — 99214 PR OFFICE/OUTPT VISIT, EST, LEVL IV, 30-39 MIN: ICD-10-PCS | Mod: 25,S$GLB,, | Performed by: INTERNAL MEDICINE

## 2019-12-06 PROCEDURE — 3074F PR MOST RECENT SYSTOLIC BLOOD PRESSURE < 130 MM HG: ICD-10-PCS | Mod: CPTII,S$GLB,, | Performed by: INTERNAL MEDICINE

## 2019-12-06 PROCEDURE — 3008F PR BODY MASS INDEX (BMI) DOCUMENTED: ICD-10-PCS | Mod: CPTII,S$GLB,, | Performed by: INTERNAL MEDICINE

## 2019-12-06 PROCEDURE — 3008F BODY MASS INDEX DOCD: CPT | Mod: CPTII,S$GLB,, | Performed by: INTERNAL MEDICINE

## 2019-12-06 PROCEDURE — 3079F PR MOST RECENT DIASTOLIC BLOOD PRESSURE 80-89 MM HG: ICD-10-PCS | Mod: CPTII,S$GLB,, | Performed by: INTERNAL MEDICINE

## 2019-12-06 PROCEDURE — 3044F HG A1C LEVEL LT 7.0%: CPT | Mod: CPTII,S$GLB,, | Performed by: INTERNAL MEDICINE

## 2019-12-06 PROCEDURE — 3044F PR MOST RECENT HEMOGLOBIN A1C LEVEL <7.0%: ICD-10-PCS | Mod: CPTII,S$GLB,, | Performed by: INTERNAL MEDICINE

## 2019-12-06 PROCEDURE — 3074F SYST BP LT 130 MM HG: CPT | Mod: CPTII,S$GLB,, | Performed by: INTERNAL MEDICINE

## 2019-12-06 PROCEDURE — 99999 PR PBB SHADOW E&M-EST. PATIENT-LVL IV: ICD-10-PCS | Mod: PBBFAC,,, | Performed by: INTERNAL MEDICINE

## 2019-12-06 RX ORDER — LANCETS
EACH MISCELLANEOUS
Qty: 50 EACH | Refills: 6 | Status: SHIPPED | OUTPATIENT
Start: 2019-12-06

## 2019-12-06 RX ORDER — PRAVASTATIN SODIUM 80 MG/1
80 TABLET ORAL NIGHTLY
Qty: 90 TABLET | Refills: 1 | Status: SHIPPED | OUTPATIENT
Start: 2019-12-06 | End: 2020-07-13

## 2019-12-06 NOTE — PROGRESS NOTES
Subjective:       Patient ID: Leydi Quintero is a 53 y.o. female.    Chief Complaint: Follow-up    Leydi Quintero  53 y.o. Black or  female    Patient presents with:  Follow-up    HPI: Here today to follow up on chronic conditions.  Diabetes--improved. She has made lifestyle changes and has been compliant with metformin.  She needs testing supplies. She needs a diabetic eye exam.                  HGBA1C                   6.5 (H)             11/30/2019            HLD--improved but not at goal. She has been taking pravastatin 80 mg daily in the mornings. She has made lifestyle changes.                   CHOL                     254 (H)             11/30/2019                 HDL                      51                  11/30/2019                 LDLCALC                  162.8 (H)           11/30/2019                 TRIG                     201 (H)             11/30/2019            HTN--stable on chlorthalidone.   She has been having low back pain with symptoms in her legs for a while but it is getting worse. She states it is better with walking. She denies trauma.        Past Medical History:  Allergic rhinitis  Anxiety  Asthma  Diabetes mellitus, type 2  Hyperlipidemia  Hypertension  Sarcoidosis    Current Outpatient Medications on File Prior to Visit:  albuterol-ipratropium (DUO-NEB) 2.5 mg-0.5 mg/3 mL nebulizer solution, Take 3 mLs by nebulization every 6 (six) hours as needed for Wheezing. Rescue, Disp: 1 Box, Rfl: 3  chlorthalidone (HYGROTEN) 25 MG Tab, TAKE 1 TABLET(25 MG) BY MOUTH EVERY DAY, Disp: 90 tablet, Rfl: 1  fluticasone (FLONASE) 50 mcg/actuation nasal spray, 1 spray by Each Nare route 2 (two) times daily., Disp: 16 g, Rfl: 3  metFORMIN (GLUCOPHAGE-XR) 500 MG 24 hr tablet, Take 1 tablet (500 mg total) by mouth 2 (two) times daily with meals., Disp: 180 tablet, Rfl: 1  promethazine-dextromethorphan (PROMETHAZINE-DM) 6.25-15 mg/5 mL Syrp, TK   2 TEA PO Q 6 H PRF COUGH,  Disp: , Rfl: 0  sodium,potassium,mag sulfates (SUPREP BOWEL PREP KIT) 17.5-3.13-1.6 gram SolR, Take 1 Bottle by mouth., Disp: , Rfl:   TRUE METRIX AIR GLUCOSE METER Misc, USE AS DIRECTED TO TEST BLOOD SUGAR ONCE DAILY, Disp: 1 each, Rfl: 0  blood sugar diagnostic Strp, Once daily glucose testing., Disp: 50 strip, Rfl: 6  lancets (LANCETS,THIN) Misc, Once daily glucose testing., Disp: 50 each, Rfl: 6  pravastatin (PRAVACHOL) 80 MG tablet, TAKE 1 TABLET(80 MG) BY MOUTH EVERY EVENING, Disp: 90 tablet, Rfl: 1  ipratropium (ATROVENT) 0.02 % nebulizer solution, Take 2.5 mLs (500 mcg total) by nebulization once. Rescue for 1 dose, Disp: 1 vial, Rfl: 0    Allergies:  Review of patient's allergies indicates:   -- Lipitor (atorvastatin)     --  Other reaction(s): Muscle pain        Review of Systems   Constitutional: Negative for unexpected weight change.   Eyes: Positive for visual disturbance.   Respiratory: Negative for shortness of breath.    Cardiovascular: Negative for chest pain.   Musculoskeletal: Positive for back pain. Negative for gait problem.   Neurological: Negative for dizziness and headaches.       Objective:      Physical Exam   Constitutional: She is oriented to person, place, and time. She appears well-developed and well-nourished. No distress.   Eyes: No scleral icterus.   Cardiovascular: Normal rate, regular rhythm and normal heart sounds.   Pulses:       Dorsalis pedis pulses are 2+ on the right side, and 2+ on the left side.   Pulmonary/Chest: Effort normal and breath sounds normal. No respiratory distress.   Musculoskeletal: She exhibits no edema.   Feet:   Right Foot:   Protective Sensation: 5 sites tested. 5 sites sensed.   Skin Integrity: Positive for callus. Negative for ulcer.   Left Foot:   Protective Sensation: 5 sites tested. 5 sites sensed.   Skin Integrity: Positive for callus. Negative for ulcer.   Neurological: She is alert and oriented to person, place, and time.   Skin: Skin is warm and  dry.   Psychiatric: She has a normal mood and affect.   Vitals reviewed.      Assessment:       1. Controlled type 2 diabetes mellitus without complication, without long-term current use of insulin    2. Hyperlipidemia LDL goal <70    3. Hypertension goal BP (blood pressure) < 130/80    4. Chronic bilateral low back pain with bilateral sciatica    5. Severe obesity (BMI 35.0-39.9) with comorbidity    6. Immunization due        Plan:       Leydi was seen today for follow-up.    Diagnoses and all orders for this visit:    Controlled type 2 diabetes mellitus without complication, without long-term current use of insulin  -     Continue metformin  -     blood sugar diagnostic Strp; Once daily glucose testing.  -     lancets (LANCETS,THIN) Misc; Once daily glucose testing.  -     Ambulatory Referral to Ophthalmology    Hyperlipidemia LDL goal <70  -     Change pravastatin (PRAVACHOL) 80 MG tablet; Take 1 tablet (80 mg total) by mouth every evening.    Hypertension goal BP (blood pressure) < 130/80  -     Continue chlorthalidone    Chronic bilateral low back pain with bilateral sciatica  -     Ambulatory consult to Physical Therapy    Severe obesity (BMI 35.0-39.9) with comorbidity  -     Lifestyle modifications discussed    Immunization due  -     Influenza - Quadrivalent (PF)    Labs and f/u in 3 months.

## 2020-03-12 DIAGNOSIS — I10 HYPERTENSION GOAL BP (BLOOD PRESSURE) < 130/80: Chronic | ICD-10-CM

## 2020-03-12 RX ORDER — CHLORTHALIDONE 25 MG/1
TABLET ORAL
Qty: 90 TABLET | Refills: 1 | Status: SHIPPED | OUTPATIENT
Start: 2020-03-12 | End: 2020-06-09

## 2020-03-18 RX ORDER — METFORMIN HYDROCHLORIDE 500 MG/1
TABLET, EXTENDED RELEASE ORAL
Qty: 180 TABLET | Refills: 1 | Status: SHIPPED | OUTPATIENT
Start: 2020-03-18 | End: 2020-07-30 | Stop reason: SDUPTHER

## 2020-03-23 ENCOUNTER — PATIENT MESSAGE (OUTPATIENT)
Dept: ADMINISTRATIVE | Facility: OTHER | Age: 55
End: 2020-03-23

## 2020-03-23 ENCOUNTER — PATIENT MESSAGE (OUTPATIENT)
Dept: INTERNAL MEDICINE | Facility: CLINIC | Age: 55
End: 2020-03-23

## 2020-03-23 DIAGNOSIS — J45.21 MILD INTERMITTENT ASTHMA WITH EXACERBATION: ICD-10-CM

## 2020-03-23 DIAGNOSIS — E11.9 CONTROLLED TYPE 2 DIABETES MELLITUS WITHOUT COMPLICATION, WITHOUT LONG-TERM CURRENT USE OF INSULIN: Primary | ICD-10-CM

## 2020-03-24 RX ORDER — IPRATROPIUM BROMIDE AND ALBUTEROL SULFATE 2.5; .5 MG/3ML; MG/3ML
3 SOLUTION RESPIRATORY (INHALATION) EVERY 6 HOURS PRN
Qty: 1 BOX | Refills: 3 | Status: SHIPPED | OUTPATIENT
Start: 2020-03-24

## 2020-03-30 ENCOUNTER — PATIENT MESSAGE (OUTPATIENT)
Dept: ADMINISTRATIVE | Facility: OTHER | Age: 55
End: 2020-03-30

## 2020-03-30 DIAGNOSIS — E11.9 TYPE 2 DIABETES MELLITUS: ICD-10-CM

## 2020-04-02 ENCOUNTER — PATIENT MESSAGE (OUTPATIENT)
Dept: INTERNAL MEDICINE | Facility: CLINIC | Age: 55
End: 2020-04-02

## 2020-04-06 ENCOUNTER — OFFICE VISIT (OUTPATIENT)
Dept: INTERNAL MEDICINE | Facility: CLINIC | Age: 55
End: 2020-04-06
Payer: COMMERCIAL

## 2020-04-06 ENCOUNTER — PATIENT MESSAGE (OUTPATIENT)
Dept: INTERNAL MEDICINE | Facility: CLINIC | Age: 55
End: 2020-04-06

## 2020-04-06 DIAGNOSIS — J01.90 ACUTE BACTERIAL SINUSITIS: Primary | ICD-10-CM

## 2020-04-06 DIAGNOSIS — B96.89 ACUTE BACTERIAL SINUSITIS: Primary | ICD-10-CM

## 2020-04-06 PROCEDURE — 99213 PR OFFICE/OUTPT VISIT, EST, LEVL III, 20-29 MIN: ICD-10-PCS | Mod: 95,,, | Performed by: INTERNAL MEDICINE

## 2020-04-06 PROCEDURE — 99213 OFFICE O/P EST LOW 20 MIN: CPT | Mod: 95,,, | Performed by: INTERNAL MEDICINE

## 2020-04-06 RX ORDER — FLUCONAZOLE 150 MG/1
150 TABLET ORAL ONCE
Qty: 1 TABLET | Refills: 0 | Status: SHIPPED | OUTPATIENT
Start: 2020-04-06 | End: 2020-04-06

## 2020-04-06 RX ORDER — AZITHROMYCIN 250 MG/1
TABLET, FILM COATED ORAL
Qty: 6 TABLET | Refills: 0 | Status: SHIPPED | OUTPATIENT
Start: 2020-04-06 | End: 2020-04-11

## 2020-04-06 NOTE — PATIENT INSTRUCTIONS
Sinusitis (Antibiotic Treatment)    The sinuses are air-filled spaces within the bones of the face. They connect to the inside of the nose. Sinusitis is an inflammation of the tissue lining the sinus cavity. Sinus inflammation can occur during a cold. It can also be due to allergies to pollens and other particles in the air. Sinusitis can cause symptoms of sinus congestion and fullness. A sinus infection causes fever, headache and facial pain. There is often green or yellow drainage from the nose or into the back of the throat (post-nasal drip). You have been given antibiotics to treat this condition.  Home care:  · Take the full course of antibiotics as instructed. Do not stop taking them, even if you feel better.  · Drink plenty of water, hot tea, and other liquids. This may help thin mucus. It also may promote sinus drainage.  · Heat may help soothe painful areas of the face. Use a towel soaked in hot water. Or,  the shower and direct the hot spray onto your face. Using a vaporizer along with a menthol rub at night may also help.   · An expectorant containing guaifenesin may help thin the mucus and promote drainage from the sinuses.  · Over-the-counter decongestants may be used unless a similar medicine was prescribed. Nasal sprays work the fastest. Use one that contains phenylephrine or oxymetazoline. First blow the nose gently. Then use the spray. Do not use these medicines more often than directed on the label or symptoms may get worse. You may also use tablets containing pseudoephedrine. Avoid products that combine ingredients, because side effects may be increased. Read labels. You can also ask the pharmacist for help. (NOTE: Persons with high blood pressure should not use decongestants. They can raise blood pressure.)  · Over-the-counter antihistamines may help if allergies contributed to your sinusitis.    · Do not use nasal rinses or irrigation during an acute sinus infection, unless told to by  your health care provider. Rinsing may spread the infection to other sinuses.  · Use acetaminophen or ibuprofen to control pain, unless another pain medicine was prescribed. (If you have chronic liver or kidney disease or ever had a stomach ulcer, talk with your doctor before using these medicines. Aspirin should never be used in anyone under 18 years of age who is ill with a fever. It may cause severe liver damage.)  · Don't smoke. This can worsen symptoms.  Follow-up care  Follow up with your healthcare provider or our staff if you are not improving within the next week.  When to seek medical advice  Call your healthcare provider if any of these occur:  · Facial pain or headache becoming more severe  · Stiff neck  · Unusual drowsiness or confusion  · Swelling of the forehead or eyelids  · Vision problems, including blurred or double vision  · Fever of 100.4ºF (38ºC) or higher, or as directed by your healthcare provider  · Seizure  · Breathing problems  · Symptoms not resolving within 10 days  Date Last Reviewed: 4/13/2015  © 8582-9419 [employer]. [address] [place] [address], [place] [address]. All rights reserved. This information is not intended as a substitute for professional medical care. Always follow your healthcare professional's instructions.

## 2020-04-06 NOTE — PROGRESS NOTES
Subjective:       Patient ID: Leydi Quintero is a 54 y.o. female.    Chief Complaint: Sinus Problem    The patient location is: home  The chief complaint leading to consultation is: sinus infection  Visit type: Virtual visit with synchronous audio and video  Total time spent with patient: 2258-0495  Each patient to whom he or she provides medical services by telemedicine is:  (1) informed of the relationship between the physician and patient and the respective role of any other health care provider with respect to management of the patient; and (2) notified that he or she may decline to receive medical services by telemedicine and may withdraw from such care at any time.    Sinus Problem   This is a new problem. The current episode started 1 to 4 weeks ago. The problem has been gradually worsening since onset. There has been no fever. The pain is moderate. Associated symptoms include headaches and sinus pressure. Pertinent negatives include no chills, coughing or shortness of breath. Past treatments include oral decongestants. The treatment provided no relief.     Review of Systems   Constitutional: Negative for chills and fever.   HENT: Positive for postnasal drip and sinus pressure.    Respiratory: Negative for cough and shortness of breath.    Neurological: Positive for headaches.       Objective:      Physical Exam   Constitutional: She is oriented to person, place, and time. She appears well-developed and well-nourished. No distress.   Pulmonary/Chest: Effort normal. No respiratory distress.   Neurological: She is alert and oriented to person, place, and time.   Psychiatric: She has a normal mood and affect. Her behavior is normal. Judgment and thought content normal.       Assessment:       1. Acute bacterial sinusitis        Plan:       Leydi was seen today for sinus problem.    Diagnoses and all orders for this visit:    Acute bacterial sinusitis  -     azithromycin (Z-JOSE) 250 MG tablet; Take 2  tablets by mouth on day 1; Take 1 tablet by mouth on days 2-5  -     Recommend use of Flonase  -     Handout provided     Other orders  -     fluconazole (DIFLUCAN) 150 MG Tab; Take 1 tablet (150 mg total) by mouth once. for 1 dose    F/U if symptoms worsen or fail to resolve with treatment.

## 2020-04-16 ENCOUNTER — PATIENT OUTREACH (OUTPATIENT)
Dept: OTHER | Facility: OTHER | Age: 55
End: 2020-04-16

## 2020-04-16 NOTE — LETTER
April 22, 2020     Leydi Quintero  [address] [id number]       Dear Leydi,    Welcome to [facility]! Our goal is to make care effective, proactive and convenient by using data you send us from home to better treat your chronic conditions.              My name is Farida Hernandez, and I am your dedicated Digital Medicine clinician. As an expert in medication management, I will help ensure that the medications you are taking continue to provide the intended benefits and help you reach your goals. You can reach me directly at [phone] or by sending me a message directly through your [facility] account.      I am Mary Burger and I will be your health . My job is to help you identify lifestyle changes to improve your disease control. We will talk about nutrition, exercise, and other ways you may be able to adjust your current habits to better your health. Additionally, we will help ensure you are completing the tests and screenings that are necessary to help manage your conditions. You can reach me directly at [phone] or by sending me a message directly through your [facility] account.    Most importantly, YOU are at the center of this team. Together, we will work to improve your overall health and encourage you to meet your goals for a healthier lifestyle.     What we expect from YOU:  · Please take frequent home blood sugar measurements according to the frequency your physician and Digital Medicine care team specify. It is important that your team see both fasting and after meal readings.      Be available to receive phone calls or [employer] messages, when appropriate, from your care team. Please let us know if there are any specific days or times that work best for us to reach you via phone.     Complete routine tests and screenings. Dont worry, we will help keep you on track!           What you should expect from your Digital Medicine Care  Team:   We will work with you to create a personalized plan of care and provide you with encouragement and education, including regarding lifestyle changes, that could help you manage your disease states.     We will adjust your current medications, if needed, and continue to monitor your long-term progress.     We will provide you and your physician with monthly progress reports after you have been in the program for more than 30 days.     We will send you reminders through [employer] and text messages to help ensure you do not miss any testing deadlines to help manage your disease states.    You will be able to reach us by phone or through your [employer] account by clicking our names under Care Team on the right side of the home screen.    I look forward to working with you to achieve your blood pressure goals!    We look forward to working with you to help manage your health,    Sincerely,    Your Digital Medicine Team    Please visit our websites to learn more:   · Diabetes: [email or web address]      Remember, we are not available for emergencies. If you have an emergency, please contact your doctors office directly or call [employer] on-call ([phone] or [phone]) or 911.    Diabetes: We want help you get important tests and screenings done regularly to assure that your health needs are met. We have put a new system in place, called CareTouch that will help us improve how we monitor and reach out to you about the following lab tests that you will need to help manage your diabetes.  · Hemoglobin A1c testing (Frequency: Every 3 to 6 months, dependent on A1c goal)  · Nephropathy Assessment, generally urine micro albumin testing (Frequency: Yearly)  · Eye exam through a quick 30-minute Eye Photo Exam (Frequency: 1-2 Years, depending on result)    When necessary you can come in to one of the labs on the attached page any weekday between 10:30 am and 4:00 pm to have your tests done prior  to their due date. Tell the  you received a CareTouch letter, or just look for the CareTouch sign.

## 2020-04-21 ENCOUNTER — PATIENT MESSAGE (OUTPATIENT)
Dept: OTHER | Facility: OTHER | Age: 55
End: 2020-04-21

## 2020-04-22 NOTE — PROGRESS NOTES
"Digital Medicine: Health  Introduction    Introduced Leydi Quintero to Digital Medicine. Discussed health  role and recommended lifestyle modifications.    Enrolled pt into Diabetes Digital Medicine program. Introduced myself as pt's health  for the program. She expressed concern regarding high BG readings. Encouraged limiting CHO intake to <200 g daily and gave suggestions. Discussed MyPlate method. Pt states she does try to limit CHO. She reports drinking water, coke zero, and juice. Discussed CHO content in juice and impact on blood sugar. Pt admits this may be why her readings are higher than she would like. We discussed limiting/removing from diet. Pt states she is "not a big eater". She consumes whole grains. Encouraged 150 minutes of physical activity weekly (~20-25 minutes daily). Gave encouragement and support. Encouraged Mrs. Quintero to reach out to me with future questions or concerns.     The history is provided by the patient.     DIABETES    Our goal is to decrease A1c within patient-specific target levels and make the process convenient so patient can avoid extra trips to the office. Reducing A1C by merely 1% results in a decreased risk of complications of at least 10%. For example, an A1C reduced from 8.5% to 7.5% results in almost 40% lower risk of kidney, eye, and nerve disease      Reviewed that the Digital Medicine care team - consisting of a clinician and a health  - will follow the most current evidence-based national guidelines for treating your condition.  The health  will focus on lifestyle modifications and motivation while the clinician will focus on medication therapy.  The care team will review all data on a regular basis and reach out as needed.      Explained that one of the key parts of the program is communication with the care team.  Asked patient to respond to outreach attempts and complete questionnaires.  Stressed importance of medication adherence.  "     Instructed patient not to allow anyone else to use phone and monitoring device.  Explained that we expect patient to test their blood sugar as prescribed by their physician or Digital Medicine Clinician.      Reviewed general Self-Monitoring of Blood Glucose (SMBG) goals:  · FPG: <  mg/dL  · 1h PPG: < 180 mg/dL  · 2h PPG: < 160 mg/dL  · Bedtime: < 150 mg/dL            Last 6 Patient Entered Readings                                          Most Recent A1c:      Recent Readings 4/21/2020 4/21/2020 4/19/2020 4/18/2020 4/18/2020    Blood Glucose (mg/dL) 215 180 193 200 157          INTERVENTION(S)  recommended diet modifications, recommend physical activity, encouragement/support and goal setting    PLAN  patient verbalizes understanding, patient amenable to changes and continue monitoring          Topic    Eye Exam     Urine Protein Check        Reviewed the importance of self-monitoring, medication adherence, and that the health  can be used as a resource for lifestyle modifications to help reduce or maintain a healthy lifestyle.    Sent link to [facility] [employer] webpages and my contact information via [employer] for future questions. Follow up scheduled.             Diet Screening   Patient reports eating or drinking the following: juice, soda, fruit and waterShe has the following dietary restrictions: low sodium diet    Intervention(s): portion control, carb reduction, limiting drinks that contain sugar and increasing water intake    Physical Activity Screening       Intervention(s): goal setting and goal tracking       SDOH

## 2020-04-28 ENCOUNTER — PATIENT OUTREACH (OUTPATIENT)
Dept: OTHER | Facility: OTHER | Age: 55
End: 2020-04-28

## 2020-04-28 NOTE — PROGRESS NOTES
Digital Medicine: Clinician Introduction    Leydi Quintero is a 54 y.o. female who is newly enrolled in the Digital Medicine Clinic.    The following information was reviewed and updated:  Preferred pharmacy   [id number] - [place], [place] - [address] AT [facility] [address] [place] [id number]  [facility] [address]  Phone: [phone] Fax: [phone]      Patient prefers a 30 days supply.     Review of patient's allergies indicates:   Allergen Reactions    Lipitor [atorvastatin]      Other reaction(s): Muscle pain       Called pt to welcome her to the Diabetes Digital Medicine program.   She reports that she is checking fasting readings only, which are more elevated than usual. This has her concerned.   She is currently working from home.   She eats 3 meals/day. Usually tries to cook something for lunch that will last until dinner (beans or spaghetti). Reports she can't adhere to diet at this time. She does report snacking on fruit bars, fruit.  She reports drinking water, Coke Zero, 1 8oz glass of juice in the morning. She walks daily.     She reports intermittent diarrhea and nausea but reports it is tolerable.     She reports that her thighs hurt when she lays on her side and is wondering if this is related to diabetes.     The history is provided by the patient.     DIABETES  Instructed patient not to allow anyone else to use phone and monitoring device.  Explained that we expect patient to test their blood sugar as prescribed by their physician or Digital Medicine Clinician.      Reviewed general Self-Monitoring of Blood Glucose (SMBG) goals:  · FPG: <  mg/dL  · 1h PPG: < 180 mg/dL  · 2h PPG: < 160 mg/dL  · Bedtime: < 150 mg/dL    Explained to patient that the digital medicine team is not available for emergencies.  Patient will call [facility] on-call ([phone] or [phone]) or [phone] if needed.      Expected SMBG schedule: Daily  Advised pt to stagger BG  monitoring between fasting and 2 hours after one of her meals.     Patient does not have history of hypoglycemia.    Patient is not on ace inhibitor or angiotensin II receptor blocker because patient is currently taking cholrthalidone..    Patient is on statin.     Patient's A1C goals is less than or equal to 7. Patient's most recent A1C result is at or below goal. Lab Results     Component                Value               Date                     HGBA1C                   6.5 (H)             11/30/2019               Medication Change: new medications    Med Review complete.    Allergies reviewed.          Last 6 Patient Entered Readings                                          Most Recent A1c: 6.5% on 11/30/2019  (Goal: 7%)     Recent Readings 4/27/2020 4/23/2020 4/21/2020 4/21/2020 4/19/2020    Blood Glucose (mg/dL) 161 204 215 180 193          INTERVENTION(S)  reviewed appropriate dose schedule, recommended diet modifications, recommended physical activity, recommended med change and encouragement/support    PLAN  patient verbalizes understanding, patient amenable to changes and continue monitoring    Start Januvia 100 mg daily. I advised her to take before breakfast with morning metformin.   Due to intermittent diarrhea and nausea, will avoid increasing metformin XR or initiating GLP1-RA during this time.   I advised her to keep snacks < 15g CHO and choose more protein related foods for snacks. Reduce juice to a 4 oz glass. Continue walking routine.   Advised pt to follow up with PCP regarding leg pains, as this may be related to something other than neuropathy (possibly arthritis). I recommended that she put pillow between her knees for support at night.           Topic    Eye Exam     Urine Protein Check        Current Medication Regimen:    Diabetes Medications             metFORMIN (GLUCOPHAGE-XR) 500 MG XR 24hr tablet TAKE 1 TABLET(500 MG) BY MOUTH TWICE DAILY WITH MEALS          Reviewed the importance  of self-monitoring, medication adherence, and that the health  can be used as a resource for lifestyle modifications to help reduce or maintain a healthy lifestyle.    Sent link to [facility] webpages and my contact information via [employer] for future questions. Follow up scheduled.             Sleep Apnea Screening  Patient not previously diagnosed with NAVNEET and     Medication Affordability Screening  Patient is currently not having problems affording medications    Medication Adherence Screening   She did not miss a dose this month.  Patient knows purpose of medications.

## 2020-05-14 ENCOUNTER — PATIENT OUTREACH (OUTPATIENT)
Dept: OTHER | Facility: OTHER | Age: 55
End: 2020-05-14

## 2020-05-14 NOTE — PROGRESS NOTES
Called patient to check in after she began use of Januvia. She states that she is tolerating this well but has not been checking her glucose. She states that she will resume SMBG today. She will check once daily, alternating the times.    Will reassess in 2 weeks.

## 2020-05-19 ENCOUNTER — PATIENT MESSAGE (OUTPATIENT)
Dept: OTHER | Facility: OTHER | Age: 55
End: 2020-05-19

## 2020-05-20 ENCOUNTER — PATIENT OUTREACH (OUTPATIENT)
Dept: OTHER | Facility: OTHER | Age: 55
End: 2020-05-20

## 2020-05-20 NOTE — PROGRESS NOTES
"Digital Medicine: Health  Follow-Up    The history is provided by the patient.     Follow Up  Follow-up reason(s): reading review and routine education      Routine Education Topics: eating patterns and physical activity  Called pt for follow up. She says she thinks her readings are looking better. She does report some stomach pain, and was concerned this may be related to taking Januvia. Forwarded concern to clinician. Although pt does say it could also be from taking too much aspirin the last couple of days.     Pt says she is starting to change her diet. Pt states she is "choosing more of the right foods". Encouraged reading food labels for carbohydrate content, and being mindful of portion sizes. She is baking her meats and consuming more vegetables. For physical activity, pt says she stopped walking daily due to being busy. Although plans to get back on track this week.     She denies technical issues with glucometer, and stopped taking readings previously due to becoming busy. Encouraged pt to continue taking readings, and to continue with progress. She denies questions for me at this time.         INTERVENTION(S)  recommended diet modifications, recommend physical activity, encouragement/support, goal setting and denied questions    PLAN  patient verbalizes understanding, patient amenable to changes and continue monitoring          Topic    Eye Exam     Urine Protein Check          Last 6 Patient Entered Readings                                          Most Recent A1c: 6.5% on 11/30/2019  (Goal: 7%)     Recent Readings 5/17/2020 5/16/2020 5/15/2020 5/14/2020 5/9/2020    Blood Glucose (mg/dL) 178 158 171 225 221                    Diet Screening   She has the following dietary restrictions: diabetic    Intervention(s): portion control    Physical Activity Screening   When asked if exercising, patient responded: yes    Patient participates in the following activities: walking    Pt says she stopped " walking daily for physical activity, but plans to get back on track this week.     Intervention(s): goal setting and goal tracking       SDOH

## 2020-05-22 ENCOUNTER — PATIENT MESSAGE (OUTPATIENT)
Dept: INTERNAL MEDICINE | Facility: CLINIC | Age: 55
End: 2020-05-22

## 2020-05-25 ENCOUNTER — PATIENT MESSAGE (OUTPATIENT)
Dept: INTERNAL MEDICINE | Facility: CLINIC | Age: 55
End: 2020-05-25

## 2020-05-28 ENCOUNTER — PATIENT MESSAGE (OUTPATIENT)
Dept: INTERNAL MEDICINE | Facility: CLINIC | Age: 55
End: 2020-05-28

## 2020-05-28 ENCOUNTER — TELEPHONE (OUTPATIENT)
Dept: INTERNAL MEDICINE | Facility: CLINIC | Age: 55
End: 2020-05-28

## 2020-05-28 ENCOUNTER — OFFICE VISIT (OUTPATIENT)
Dept: INTERNAL MEDICINE | Facility: CLINIC | Age: 55
End: 2020-05-28
Payer: COMMERCIAL

## 2020-05-28 ENCOUNTER — PATIENT OUTREACH (OUTPATIENT)
Dept: OTHER | Facility: OTHER | Age: 55
End: 2020-05-28

## 2020-05-28 DIAGNOSIS — M54.41 CHRONIC BILATERAL LOW BACK PAIN WITH BILATERAL SCIATICA: Primary | ICD-10-CM

## 2020-05-28 DIAGNOSIS — M54.42 CHRONIC BILATERAL LOW BACK PAIN WITH BILATERAL SCIATICA: Primary | ICD-10-CM

## 2020-05-28 DIAGNOSIS — G89.29 CHRONIC BILATERAL LOW BACK PAIN WITH BILATERAL SCIATICA: Primary | ICD-10-CM

## 2020-05-28 PROCEDURE — 99213 OFFICE O/P EST LOW 20 MIN: CPT | Mod: 95,,, | Performed by: INTERNAL MEDICINE

## 2020-05-28 PROCEDURE — 99213 PR OFFICE/OUTPT VISIT, EST, LEVL III, 20-29 MIN: ICD-10-PCS | Mod: 95,,, | Performed by: INTERNAL MEDICINE

## 2020-05-28 NOTE — PROGRESS NOTES
Digital Medicine: Clinician Follow-Up    Outreach to introduce myself and follow-up with patient. 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     Patient started on Januvia since in program. Note report of GI upset. Patient reports she held her dose for 2 days and the issue resolved. She restarted the Januvia with no problems. Believes upset was due to aspirin use at the time - she has since stopped.     Patient working on reducing carbohydrates and juice. Encouraged these efforts.    Reviewed blood glucoses. Readings are trending down. Most post-prandial readings at or near goal. Given post-prandial controlled, encouraged mainly fasting readings at this time (3-4 per week).     Suspect fasting elevations based on current SMBG. Would likely benefit from metformin titration. Will follow-up after more fasting readings to determine need for metformin titration. May need to obtain updated A1C to determine current level of control - labs schedule early June.     Will follow-up in 3-5 weeks.     The history is provided by the patient.     Follow Up  Follow-up reason(s): reading review      Readings are trending down due to lifestyle change and medication adherence.        INTERVENTION(S)  recommended diet modifications, recommended physical activity, reviewed monitoring technique and encouragement/support    PLAN  additional monitoring needed          Topic    Eye Exam     Urine Protein Check     Hemoglobin A1C          Last 6 Patient Entered Readings                                          Most Recent A1c: 6.5% on 11/30/2019  (Goal: 7%)     Recent Readings 5/17/2020 5/16/2020 5/15/2020 5/14/2020 5/9/2020    Blood Glucose (mg/dL) 178 158 171 225 221             Diabetes Medications             metFORMIN (GLUCOPHAGE-XR) 500 MG XR 24hr tablet TAKE 1 TABLET(500 MG) BY MOUTH TWICE DAILY WITH MEALS    SITagliptin (JANUVIA) 100 MG Tab Take 1 tablet (100 mg  total) by mouth once daily.               Screenings

## 2020-05-28 NOTE — PROGRESS NOTES
Subjective:       Patient ID: Leydi Quintero is a 54 y.o. female.    Chief Complaint: Back Pain    The patient location is: [place]   The chief complaint leading to consultation is: back pain, worsening     Visit type: audiovisual    Face to Face time with patient: 6 minutes  6 minutes of total time spent on the encounter, which includes face to face time and non-face to face time preparing to see the patient (eg, review of tests), Obtaining and/or reviewing separately obtained history, Documenting clinical information in the electronic or other health record, Independently interpreting results (not separately reported) and communicating results to the patient/family/caregiver, or Care coordination (not separately reported).     Each patient to whom he or she provides medical services by telemedicine is:  (1) informed of the relationship between the physician and patient and the respective role of any other health care provider with respect to management of the patient; and (2) notified that he or she may decline to receive medical services by telemedicine and may withdraw from such care at any time.    Back Pain   This is a recurrent problem. The current episode started more than 1 month ago. The problem occurs daily. The problem has been gradually worsening since onset. The pain is present in the sacro-iliac. The quality of the pain is described as stabbing. The pain radiates to the left foot, left thigh, right foot and right thigh. The pain is at a severity of 6/10. The pain is moderate. The pain is worse during the night. The symptoms are aggravated by standing. Stiffness is present at night. Associated symptoms include leg pain, numbness, paresthesias, tingling and weakness. Pertinent negatives include no abdominal pain, bladder incontinence, bowel incontinence, chest pain, dysuria, fever, headaches, paresis, pelvic pain, perianal numbness or weight loss. Risk factors include menopause. She has tried  acupuncture and analgesics (Physical therapy; [facility]) for the symptoms. The treatment provided mild relief.     Review of Systems   Constitutional: Negative for fever and weight loss.   Cardiovascular: Negative for chest pain.   Gastrointestinal: Negative for abdominal pain and bowel incontinence.   Genitourinary: Negative for bladder incontinence, dysuria, hematuria and pelvic pain.   Musculoskeletal: Positive for back pain.   Neurological: Positive for tingling, weakness, numbness and paresthesias. Negative for headaches.       Objective:      Physical Exam   Constitutional: She is oriented to person, place, and time. She appears well-developed and well-nourished. No distress.   Pulmonary/Chest: Effort normal. No respiratory distress.   Neurological: She is alert and oriented to person, place, and time.   Psychiatric: She has a normal mood and affect. Her behavior is normal. Judgment and thought content normal.       Assessment:       1. Chronic bilateral low back pain with bilateral sciatica        Plan:       Leydi was seen today for back pain.    Diagnoses and all orders for this visit:    Chronic bilateral low back pain with bilateral sciatica  -     Ambulatory referral/consult to Pain Clinic; Future  -     Discussed pharmacotherapy and heat application  -     Handout provided     F/U as needed.

## 2020-05-28 NOTE — PATIENT INSTRUCTIONS
Back Pain (Acute or Chronic)    Back pain is one of the most common problems. The good news is that most people feel better in 1 to 2 weeks, and most of the rest in 1 to 2 months. Most people can remain active.  People experience and describe pain differently; not everyone is the same.  · The pain can be sharp, stabbing, shooting, aching, cramping or burning.  · Movement, standing, bending, lifting, sitting, or walking may worsen pain.  · It can be localized to one spot or area, or it can be more generalized.  · It can spread or radiate upwards, to the front, or go down your arms or legs (sciatica).  · It can cause muscle spasm.  Most of the time, mechanical problems with the muscles or spine cause the pain. Mechanical problems are usually caused by an injury to the muscles or ligaments. While illness can cause back pain, it is usually not caused by a serious illness. Mechanical problems include:   · Physical activity such as sports, exercise, work, or normal activity  · Overexertion, lifting, pushing, pulling incorrectly or too aggressively  · Sudden twisting, bending, or stretching from an accident, or accidental movement  · Poor posture  · Stretching or moving wrong, without noticing pain at the time  · Poor coordination, lack of regular exercise (check with your doctor about this)  · Spinal disc disease or arthritis  · Stress  Pain can also be related to pregnancy, or illness like appendicitis, bladder or kidney infections, pelvic infections, and many other things.  Acute back pain usually gets better in 1 to 2 weeks. Back pain related to disk disease, arthritis in the spinal joints or spinal stenosis (narrowing of the spinal canal) can become chronic and last for months or years.  Unless you had a physical injury (for example, a car accident or fall) X-rays are usually not needed for the initial evaluation of back pain. If pain continues and does not respond to medical treatment, X-rays and other tests may be  needed.  Home care  Try these home care recommendations:  · When in bed, try to find a position of comfort. A firm mattress is best. Try lying flat on your back with pillows under your knees. You can also try lying on your side with your knees bent up towards your chest and a pillow between your knees.  · At first, do not try to stretch out the sore spots. If there is a strain, it is not like the good soreness you get after exercising without an injury. In this case, stretching may make it worse.  · Avoid prolong sitting, long car rides, or travel. This puts more stress on the lower back than standing or walking.  · During the first 24 to 72 hours after an acute injury or flare up of chronic back pain, apply an ice pack to the painful area for 20 minutes and then remove it for 20 minutes. Do this over a period of 60 to 90 minutes or several times a day. This will reduce swelling and pain. Wrap the ice pack in a thin towel or plastic to protect your skin.  · You can start with ice, then switch to heat. Heat (hot shower, hot bath, or heating pad) reduces pain and works well for muscle spasms. Heat can be applied to the painful area for 20 minutes then remove it for 20 minutes. Do this over a period of 60 to 90 minutes or several times a day. Do not sleep on a heating pad. It can lead to skin burns or tissue damage.  · You can alternate ice and heat therapy. Talk with your doctor about the best treatment for your back pain.  · Therapeutic massage can help relax the back muscles without stretching them.  · Be aware of safe lifting methods and do not lift anything without stretching first.  Medicines  Talk to your doctor before using medicine, especially if you have other medical problems or are taking other medicines.  · You may use over-the-counter medicine as directed on the bottle to control pain, unless another pain medicine was prescribed. If you have chronic conditions like diabetes, liver or kidney disease,  stomach ulcers, or gastrointestinal bleeding, or are taking blood thinners, talk to your doctor before taking any medicine.  · Be careful if you are given a prescription medicines, narcotics, or medicine for muscle spasms. They can cause drowsiness, affect your coordination, reflexes, and judgement. Do not drive or operate heavy machinery.  Follow-up care  Follow up with your healthcare provider, or as advised.   A radiologist will review any X-rays that were taken. Your provide will notify you of any new findings that may affect your care.  Call 911  Call emergency services if any of the following occur:  · Trouble breathing  · Confusion  · Very drowsy or trouble awakening  · Fainting or loss of consciousness  · Rapid or very slow heart rate  · Loss of bowel or bladder control  When to seek medical advice  Call your healthcare provider right away if any of these occur:   · Pain becomes worse or spreads to your legs  · Weakness or numbness in one or both legs  · Numbness in the groin or genital area  Date Last Reviewed: 7/1/2016  © 1350-7415 [employer]. [address] [place] [address], [place] [address]. All rights reserved. This information is not intended as a substitute for professional medical care. Always follow your healthcare professional's instructions.

## 2020-06-03 ENCOUNTER — PATIENT OUTREACH (OUTPATIENT)
Dept: ADMINISTRATIVE | Facility: OTHER | Age: 55
End: 2020-06-03

## 2020-06-03 NOTE — PROGRESS NOTES
Chart reviewed.   Immunizations: Triggered Imm Registry     Orders placed: n/a  Upcoming appts to satisfy DONAL topics: n/a

## 2020-06-04 ENCOUNTER — HOSPITAL ENCOUNTER (OUTPATIENT)
Dept: RADIOLOGY | Facility: HOSPITAL | Age: 55
Discharge: HOME | End: 2020-06-04
Attending: PAIN MEDICINE
Payer: COMMERCIAL

## 2020-06-04 ENCOUNTER — OFFICE VISIT (OUTPATIENT)
Dept: PAIN MEDICINE | Facility: CLINIC | Age: 55
End: 2020-06-04
Payer: COMMERCIAL

## 2020-06-04 VITALS
SYSTOLIC BLOOD PRESSURE: 134 MMHG | HEART RATE: 68 BPM | WEIGHT: 239 LBS | DIASTOLIC BLOOD PRESSURE: 90 MMHG | RESPIRATION RATE: 20 BRPM | BODY MASS INDEX: 39.82 KG/M2 | HEIGHT: 65 IN

## 2020-06-04 DIAGNOSIS — G89.29 CHRONIC BILATERAL LOW BACK PAIN WITH BILATERAL SCIATICA: ICD-10-CM

## 2020-06-04 DIAGNOSIS — M54.16 LUMBAR RADICULOPATHY: ICD-10-CM

## 2020-06-04 DIAGNOSIS — M54.41 CHRONIC BILATERAL LOW BACK PAIN WITH BILATERAL SCIATICA: ICD-10-CM

## 2020-06-04 DIAGNOSIS — M54.42 CHRONIC BILATERAL LOW BACK PAIN WITH BILATERAL SCIATICA: ICD-10-CM

## 2020-06-04 DIAGNOSIS — M47.816 LUMBAR SPONDYLOSIS: Primary | ICD-10-CM

## 2020-06-04 PROCEDURE — 72100 X-RAY EXAM L-S SPINE 2/3 VWS: CPT | Mod: TC

## 2020-06-04 PROCEDURE — 72100 XR LUMBAR SPINE AP AND LATERAL: ICD-10-PCS | Mod: 26,,, | Performed by: RADIOLOGY

## 2020-06-04 PROCEDURE — 99244 PR OFFICE CONSULTATION,LEVEL IV: ICD-10-PCS | Mod: S$GLB,,, | Performed by: PAIN MEDICINE

## 2020-06-04 PROCEDURE — 99999 PR PBB SHADOW E&M-EST. PATIENT-LVL V: ICD-10-PCS | Mod: PBBFAC,,, | Performed by: PAIN MEDICINE

## 2020-06-04 PROCEDURE — 72100 X-RAY EXAM L-S SPINE 2/3 VWS: CPT | Mod: 26,,, | Performed by: RADIOLOGY

## 2020-06-04 PROCEDURE — 99999 PR PBB SHADOW E&M-EST. PATIENT-LVL V: CPT | Mod: PBBFAC,,, | Performed by: PAIN MEDICINE

## 2020-06-04 PROCEDURE — 99244 OFF/OP CNSLTJ NEW/EST MOD 40: CPT | Mod: S$GLB,,, | Performed by: PAIN MEDICINE

## 2020-06-04 RX ORDER — GABAPENTIN 300 MG/1
CAPSULE ORAL
Qty: 90 CAPSULE | Refills: 3 | Status: SHIPPED | OUTPATIENT
Start: 2020-06-04 | End: 2021-04-01

## 2020-06-04 NOTE — LETTER
June 4, 2020      Miranda Chavez DO  [address] [place] [address] [address]io [address] [id number]           [facility]  [address] [place] [address] [address] [address]  Phone: [phone]  Fax: [phone]          Patient: Leydi Quintero   MR Number: [id number]   YOB: 1965   Date of Visit: 6/4/2020       Dear Dr. Miranda Chavez:    Thank you for referring Leydi Quintero to me for evaluation. Attached you will find relevant portions of my assessment and plan of care.    If you have questions, please do not hesitate to call me. I look forward to following Leydi Quintero along with you.    Sincerely,    Casper Ramesh MD    Enclosure  CC:  No Recipients    If you would like to receive this communication electronically, please contact [email or web address] or [phone] to request more information on [employer] access.    For providers and/or their staff who would like to refer a patient to [facility], please contact us through our one-stop-shop provider referral line, Keyonna Sosa, at [phone].    If you feel you have received this communication in error or would no longer like to receive these types of communications, please e-mail [email or web address]

## 2020-06-04 NOTE — PROGRESS NOTES
Chief Pain Complaint:  Low back pain    This note was created using voice recognition, there may be errors that were missed during proofreading.    History of Present Illness:   This patient is a 54 y.o. female who presents today complaining of the above noted pain/s. The patient describes the pain as follows.  Ms. Quintero is new patient clinic with complaints of low back and leg pain for approximately one year.  There was no inciting event and currently rates her pain as 8/10.  She describes mostly a sharp, aching, with numbness and tingling in her legs bilaterally.  Her symptoms worse with walking and standing for long periods of time her somewhat improved with rest and propping her feet up.  She has not really tried anything with medications however she has been physical therapy which he completed 4 months ago greater than 6 weeks physical therapy with some benefit.  She continues to walk for exercise at home in addition to performing physical therapy exercises.  She has tried Tylenol over-the-counter with minimal symptomatic pain relief.  She finds that her right leg seems to be worse in the left leg and she endorses having numbness, weakness, tingling in bilateral lower extremities.  She does wear compression socks which did provide some benefit however these of loss her effectiveness.  She denies having had surgery injections in her lumbar spine.  She finds that heat and massage do provide some benefit.    Previous Therapy:  Medications:  Tylenol  Injections: None  Surgeries: None  Physical Therapy: Completed in the Past, completed greater than 6 weeks of physical therapy approximately 4 months ago    Past Surgical History:   Procedure Laterality Date    FOOT SURGERY  at 9y/o    arch lift       Family History   Problem Relation Age of Onset    Diabetes Mother     Hypertension Mother     Hypertension Father     Hyperlipidemia Father     Breast cancer Sister 58    Colon cancer Neg Hx     Ovarian cancer  Neg Hx        Social History     Socioeconomic History    Marital status:      Spouse name: Not on file    Number of children: 1    Years of education: Not on file    Highest education level: Not on file   Occupational History     Employer: [employer]   Social Needs    Financial resource strain: Not on file    Food insecurity:     Worry: Not on file     Inability: Not on file    Transportation needs:     Medical: Not on file     Non-medical: Not on file   Tobacco Use    Smoking status: Never Smoker    Smokeless tobacco: Never Used   Substance and Sexual Activity    Alcohol use: No     Alcohol/week: 0.0 standard drinks    Drug use: No    Sexual activity: Yes     Partners: Male     Birth control/protection: Post-menopausal   Lifestyle    Physical activity:     Days per week: Not on file     Minutes per session: Not on file    Stress: Not on file   Relationships    Social connections:     Talks on phone: Not on file     Gets together: Not on file     Attends Church service: Not on file     Active member of club or organization: Not on file     Attends meetings of clubs or organizations: Not on file     Relationship status: Not on file   Other Topics Concern    Not on file   Social History Narrative    Not on file      Imaging / Labs / Studies (reviewed on 6/4/2020):    Review of Systems:  Review of Systems   Constitutional: Negative for fever.   Eyes: Negative for blurred vision.   Respiratory: Negative for cough and wheezing.    Cardiovascular: Negative for chest pain and orthopnea.   Gastrointestinal: Negative for constipation, diarrhea, nausea and vomiting.   Genitourinary: Negative for dysuria.   Musculoskeletal: Positive for back pain.        Lumbar radiculopathy   Skin: Negative for itching and rash.   Neurological: Positive for tingling and weakness.   Endo/Heme/Allergies: Does not bruise/bleed easily.       Physical Exam:  LMP 11/23/2013  (reviewed on  2020)\  General    Constitutional: She is oriented to person, place, and time. She appears well-developed and well-nourished.   HENT:   Head: Normocephalic and atraumatic.   Eyes: EOM are normal.   Neck: Neck supple.   Pulmonary/Chest: Effort normal.   Abdominal: She exhibits no distension.   Neurological: She is alert and oriented to person, place, and time. No cranial nerve deficit.   Psychiatric: She has a normal mood and affect.     General Musculoskeletal Exam   Gait: normal     Back (L-Spine & T-Spine) / Neck (C-Spine) Exam     Tenderness Right paramedian tenderness of the Lower L-Spine. Left paramedian tenderness of the Lower L-Spine.     Back (L-Spine & T-Spine) Range of Motion   Extension: normal   Flexion: normal   Lateral bend right: normal   Lateral bend left: normal   Rotation right: normal   Rotation left: normal     Spinal Sensation   Right Side Sensation  L-Spine Level: normal  Left Side Sensation  L-Spine Level: normal    Comments:  Minimal tenderness palpation over bilateral lumbar facets; sensation intact to light touch bilateral lower extremities; positive straight leg raise for radicular symptoms bilaterally; nontender to PSIS palpation bilaterally      Muscle Strength   Right Lower Extremity   Hip Flexion: 5/5   Quadriceps:  5/5   Hamstrin/5   EHL:  5/5  Left Lower Extremity   Hip Flexion: 5/5   Quadriceps:  5/5   Hamstrin/5   EHL:  5/5    Reflexes     Left Side  Quadriceps:  2+  Achilles:  2+  Ankle Clonus:  absent    Right Side   Quadriceps:  2+  Achilles:  2+  Ankle Clonus:  absent      Assessment  Lumbar Spondylosis  Lumbar Radiculopathy    1. 54 y.o. year old patient with PMH of   Past Medical History:   Diagnosis Date    Allergic rhinitis     Anxiety     Asthma     Diabetes mellitus, type 2     Hyperlipidemia     Hypertension     Sarcoidosis       presenting with pain located lumbar spine, bilateral lower extremities  2. Pain Generators / Etiology: Lumbar  Radiculopathy and Lumbar Spondylosis  3. Failed Meds (E- Effective, NE- Not Effective):  Tylenol-minimally effective  4. Physical Therapy - Completed in the Past greater than 6 weeks physical therapy approximately 4 months ago  5. Psychological comorbidities - None  6. Anticoagulants / Antiplatelets: None     PLAN:  1. Medications:  Continue Tylenol over-the-counter not to exceed 3000 mg daily; will start gabapentin 300 mg with titration schedule reach 3 times daily dosing over the next 3 weeks    2. PT - patient's completed greater than 6 weeks physical therapy in the last 4 months with minimal symptomatic relief; she continues to walk for exercise and performs physical therapy exercises at home on a daily basis  3. Psychological - none  4. Labs - obtain  none  5. Imaging - obtain lumbar x-ray today and lumbar MRI  6. Interventions - schedule none; consider lumbar epidural steroid injections in the future  7. Referrals - none  8. Records - none  9. Follow up visit - follow up in clinic in 2 weeks for MRI review  10. Patient Questions - answered all of the patient's questions regarding diagnosis, therapy, and treatment  11.  This condition does not require this patient to take time off of work    SCOTT Ramesh MD  Interventional Pain  [facility] [place]

## 2020-06-04 NOTE — PATIENT INSTRUCTIONS
-obtain lumbar x-ray today  -will obtain lumbar MRI  -will start gabapentin 300 mg a titration schedule reach 3 times daily dosing  -continue Tylenol over-the-counter as needed not to exceed 3000 mg daily  -continue physical therapy stretching and exercises at home as tolerated  -follow up in clinic in 2 weeks for lumbar MRI review

## 2020-06-12 RX ORDER — PREGABALIN 50 MG/1
50 CAPSULE ORAL 2 TIMES DAILY
Qty: 60 CAPSULE | Refills: 0 | Status: SHIPPED | OUTPATIENT
Start: 2020-06-12 | End: 2021-04-01

## 2020-06-16 ENCOUNTER — HOSPITAL ENCOUNTER (OUTPATIENT)
Dept: RADIOLOGY | Facility: HOSPITAL | Age: 55
Discharge: HOME | End: 2020-06-16
Attending: PAIN MEDICINE
Payer: COMMERCIAL

## 2020-06-16 DIAGNOSIS — G89.29 CHRONIC BILATERAL LOW BACK PAIN WITH BILATERAL SCIATICA: ICD-10-CM

## 2020-06-16 DIAGNOSIS — M54.42 CHRONIC BILATERAL LOW BACK PAIN WITH BILATERAL SCIATICA: ICD-10-CM

## 2020-06-16 DIAGNOSIS — M54.41 CHRONIC BILATERAL LOW BACK PAIN WITH BILATERAL SCIATICA: ICD-10-CM

## 2020-06-16 PROCEDURE — 72148 MRI LUMBAR SPINE W/O DYE: CPT | Mod: TC

## 2020-06-17 ENCOUNTER — PATIENT OUTREACH (OUTPATIENT)
Dept: ADMINISTRATIVE | Facility: OTHER | Age: 55
End: 2020-06-17

## 2020-06-17 ENCOUNTER — PATIENT OUTREACH (OUTPATIENT)
Dept: OTHER | Facility: OTHER | Age: 55
End: 2020-06-17

## 2020-06-17 NOTE — PROGRESS NOTES
"Digital Medicine: Health  Follow-Up    The history is provided by the patient.   Follow Up  Follow-up reason(s): reading review and routine education      Alert received.   Care Team received low BG alert.  Patient is not experiencing symptoms.  Reading was invalid because She did not get enough blood on the strips.   Routine Education Topics: eating patterns  Called pt for follow up and to address low BG alerts of 25 (5/27) and 32 (6/1). Pt states these were an error. Will delete.    Pt states she received a BG reading in the 400s because she was "not thinking" and had juice. She knows this spikes her BG.     Went over CHO recommendations. Encouraged 30-45g per meal and 15g for snacks. Also sent via [employer] so pt can go back for reference.     Denies questions or concerns today.      INTERVENTION(S)  recommended diet modifications, encouragement/support, goal setting and denied questions    PLAN  patient verbalizes understanding, patient amenable to changes and continue monitoring          Topic    Eye Exam     Urine Protein Check          Last 6 Patient Entered Readings                                          Most Recent A1c: 6.2% on 6/4/2020  (Goal: 8%)     Recent Readings 6/14/2020 6/11/2020 6/10/2020 6/9/2020 6/7/2020    Blood Glucose (mg/dL) 177 212 236 209 225                    Diet Screening   No change to diet.  Patient reports eating or drinking the following: juiceShe has the following dietary restrictions: diabetic    Physical Activity Screening   No change to exercise routine.          SDOH  "

## 2020-06-18 ENCOUNTER — PATIENT MESSAGE (OUTPATIENT)
Dept: INTERNAL MEDICINE | Facility: CLINIC | Age: 55
End: 2020-06-18

## 2020-06-18 ENCOUNTER — PATIENT OUTREACH (OUTPATIENT)
Dept: OTHER | Facility: OTHER | Age: 55
End: 2020-06-18

## 2020-06-18 ENCOUNTER — OFFICE VISIT (OUTPATIENT)
Dept: PAIN MEDICINE | Facility: CLINIC | Age: 55
End: 2020-06-18
Payer: COMMERCIAL

## 2020-06-18 VITALS
SYSTOLIC BLOOD PRESSURE: 130 MMHG | HEART RATE: 73 BPM | HEIGHT: 65 IN | DIASTOLIC BLOOD PRESSURE: 79 MMHG | BODY MASS INDEX: 39.76 KG/M2 | WEIGHT: 238.63 LBS

## 2020-06-18 DIAGNOSIS — Z03.818 ENCOUNTER FOR OBSERVATION FOR SUSPECTED EXPOSURE TO OTHER BIOLOGICAL AGENTS RULED OUT: Primary | ICD-10-CM

## 2020-06-18 DIAGNOSIS — M54.16 LUMBAR RADICULOPATHY: ICD-10-CM

## 2020-06-18 DIAGNOSIS — M47.816 LUMBAR SPONDYLOSIS: Primary | ICD-10-CM

## 2020-06-18 DIAGNOSIS — N28.1 RENAL CYST, RIGHT: Primary | ICD-10-CM

## 2020-06-18 PROCEDURE — 3075F SYST BP GE 130 - 139MM HG: CPT | Mod: CPTII,S$GLB,, | Performed by: PAIN MEDICINE

## 2020-06-18 PROCEDURE — 3008F BODY MASS INDEX DOCD: CPT | Mod: CPTII,S$GLB,, | Performed by: PAIN MEDICINE

## 2020-06-18 PROCEDURE — 99999 PR PBB SHADOW E&M-EST. PATIENT-LVL IV: CPT | Mod: PBBFAC,,, | Performed by: PAIN MEDICINE

## 2020-06-18 PROCEDURE — 3078F PR MOST RECENT DIASTOLIC BLOOD PRESSURE < 80 MM HG: ICD-10-PCS | Mod: CPTII,S$GLB,, | Performed by: PAIN MEDICINE

## 2020-06-18 PROCEDURE — 3075F PR MOST RECENT SYSTOLIC BLOOD PRESS GE 130-139MM HG: ICD-10-PCS | Mod: CPTII,S$GLB,, | Performed by: PAIN MEDICINE

## 2020-06-18 PROCEDURE — 3008F PR BODY MASS INDEX (BMI) DOCUMENTED: ICD-10-PCS | Mod: CPTII,S$GLB,, | Performed by: PAIN MEDICINE

## 2020-06-18 PROCEDURE — 99214 OFFICE O/P EST MOD 30 MIN: CPT | Mod: S$GLB,,, | Performed by: PAIN MEDICINE

## 2020-06-18 PROCEDURE — 3078F DIAST BP <80 MM HG: CPT | Mod: CPTII,S$GLB,, | Performed by: PAIN MEDICINE

## 2020-06-18 PROCEDURE — 99999 PR PBB SHADOW E&M-EST. PATIENT-LVL IV: ICD-10-PCS | Mod: PBBFAC,,, | Performed by: PAIN MEDICINE

## 2020-06-18 PROCEDURE — 99214 PR OFFICE/OUTPT VISIT, EST, LEVL IV, 30-39 MIN: ICD-10-PCS | Mod: S$GLB,,, | Performed by: PAIN MEDICINE

## 2020-06-18 NOTE — PATIENT INSTRUCTIONS
-will schedule for bilateral lower extremity EMG testing for radiculopathy  -continue Lyrica 50 mg tablets twice daily  -will schedule for bilateral L2-for lumbar medial branch blocks targeting the the L3/4-L4/5 facet joints  -follow up in clinic after the EMG and medial branch blocks or completed

## 2020-06-18 NOTE — PROGRESS NOTES
Digital Medicine: Clinician Follow-Up    Outreach to discuss recent A1C update.     Patient reports doing well.     Reviewed SMBG in comparison to recent A1C of 6.2% - controlled and stable. SMBG is showing average FBG of 187 with no controlled fasting readings, but her CMP shows a FBG of 114. Patient also notes low reading of 32 was inaccurate and a false reading - denies symptoms and repeat much higher.    PharmD suspects device is not reading accurately and patient agrees. Does not have another glucometer at home for comparison. Will use control solution to test. Will proactively enter CRM.     Will continue to monitor for readings after new glucometer. A1C controlled at this time - not concerned for DM control.     The history is provided by the patient.   Follow Up  Follow-up reason(s): reading review      Readings are trending up due to Inaccurate Device.        INTERVENTION(S)  encouragement/support    PLAN  additional monitoring needed and referral          Topic    Eye Exam     Urine Protein Check          Last 6 Patient Entered Readings                                          Most Recent A1c: 6.2% on 6/4/2020  (Goal: 8%)     Recent Readings 6/14/2020 6/11/2020 6/10/2020 6/9/2020 6/7/2020    Blood Glucose (mg/dL) 177 212 236 209 225             Diabetes Medications             metFORMIN (GLUCOPHAGE-XR) 500 MG XR 24hr tablet TAKE 1 TABLET(500 MG) BY MOUTH TWICE DAILY WITH MEALS    SITagliptin (JANUVIA) 100 MG Tab Take 1 tablet (100 mg total) by mouth once daily.               Screenings

## 2020-06-18 NOTE — PROGRESS NOTES
Chief Pain Complaint:  Low back pain    This note was created using voice recognition, there may be errors that were missed during proofreading.    History of Present Illness:   Ms. Quintero returns to clinic for follow-up.  She has obtained her lumbar MRI which we will review today.  She continues have pain located primarily low back in the posterior lower legs.  She rates her pain currently as a 4/10.  She continues to have pain located in the axial lumbar spine in addition to the bilateral lower extremities.  MRI shows minor facet arthropathy L3/4-L4/5 in addition to minimal left L4/5 neural foraminal stenosis.  She has started on gabapentin however this was causing excessive drowsiness in addition to an increase in appetite therefore she has been switched to Lyrica 50 mg capsules twice daily which she has found to be much more helpful.  She continues to have pain in her lower legs in a nondermatomal distribution but reports that radiates from her low back distally into her feet.  She reports the right leg is worse than the left leg currently.  She completed physical therapy of greater than 6 weeks in February of 2020 and continues to perform therapy exercises at home as tolerated.    Initial HPI:  This patient is a 54 y.o. female who presents today complaining of the above noted pain/s. The patient describes the pain as follows.  Ms. Quintero is new patient clinic with complaints of low back and leg pain for approximately one year.  There was no inciting event and currently rates her pain as 8/10.  She describes mostly a sharp, aching, with numbness and tingling in her legs bilaterally.  Her symptoms worse with walking and standing for long periods of time her somewhat improved with rest and propping her feet up.  She has not really tried anything with medications however she has been physical therapy which he completed 4 months ago greater than 6 weeks physical therapy with some benefit.  She continues to walk for  exercise at home in addition to performing physical therapy exercises.  She has tried Tylenol over-the-counter with minimal symptomatic pain relief.  She finds that her right leg seems to be worse in the left leg and she endorses having numbness, weakness, tingling in bilateral lower extremities.  She does wear compression socks which did provide some benefit however these of loss her effectiveness.  She denies having had surgery injections in her lumbar spine.  She finds that heat and massage do provide some benefit.    Previous Therapy:  Medications:  Tylenol, gabapentin, Lyrica  Injections: None  Surgeries: None  Physical Therapy: Completed in the Past, completed greater than 6 weeks of physical therapy approximately 4 months ago    Past Surgical History:   Procedure Laterality Date    FOOT SURGERY  at 11y/o    arch lift       Family History   Problem Relation Age of Onset    Diabetes Mother     Hypertension Mother     Hypertension Father     Hyperlipidemia Father     Breast cancer Sister 58    Colon cancer Neg Hx     Ovarian cancer Neg Hx        Social History     Socioeconomic History    Marital status:      Spouse name: Not on file    Number of children: 1    Years of education: Not on file    Highest education level: Not on file   Occupational History     Employer: [employer]   Social Needs    Financial resource strain: Not on file    Food insecurity     Worry: Not on file     Inability: Not on file    Transportation needs     Medical: Not on file     Non-medical: Not on file   Tobacco Use    Smoking status: Never Smoker    Smokeless tobacco: Never Used   Substance and Sexual Activity    Alcohol use: No     Alcohol/week: 0.0 standard drinks    Drug use: No    Sexual activity: Yes     Partners: Male     Birth control/protection: Post-menopausal   Lifestyle    Physical activity     Days per week: Not on file     Minutes per session: Not on file    Stress: Not on file    Relationships    Social connections     Talks on phone: Not on file     Gets together: Not on file     Attends Mosque service: Not on file     Active member of club or organization: Not on file     Attends meetings of clubs or organizations: Not on file     Relationship status: Not on file   Other Topics Concern    Not on file   Social History Narrative    Not on file      Imaging / Labs / Studies (reviewed on 6/18/2020):    Narrative & Impression     EXAMINATION:  MRI LUMBAR SPINE WITHOUT CONTRAST     CLINICAL HISTORY:  Lumbago with sciatica, left sideBack pain or radiculopathy, > 6 wks;     TECHNIQUE:  Standard multiplanar noncontrast MRI sequences of the lumbar spine.     COMPARISON:  None     FINDINGS:  The distal cord and conus reveal normal signal and morphology. The lumbar vertebra reveal normal alignment, shape and signal intensity.     T12-L1: Unremarkable.     L1-2:  Unremarkable.  Incidental demonstration of a 3 cm right renal cysts.     L2-3:  Unremarkable.     L3-4:  Minor facet hypertrophy.     L4-5:  Mild disc desiccation with annular bulge.  Left foraminal annular fissure.  Minor left foraminal stenosis.  Minor facet hypertrophy.     L5-S1:  Unremarkable.     Impression:     Mild L4-5 degenerative disc disease with annular bulge and left foraminal annular fissure.  Minor left foraminal stenosis.  Minor multilevel facet arthrosis.          Review of Systems:  Review of Systems   Constitutional: Negative for fever.   Eyes: Negative for blurred vision.   Respiratory: Negative for cough and wheezing.    Cardiovascular: Negative for chest pain and orthopnea.   Gastrointestinal: Negative for constipation, diarrhea, nausea and vomiting.   Genitourinary: Negative for dysuria.   Musculoskeletal: Positive for back pain.        Lumbar radiculopathy   Skin: Negative for itching and rash.   Neurological: Positive for tingling and weakness.   Endo/Heme/Allergies: Does not bruise/bleed easily.  "      Physical Exam:  /79   Pulse 73   Ht 5' 5" (1.651 m)   Wt 108.3 kg (238 lb 10.4 oz)   LMP 2013   BMI 39.71 kg/m²  (reviewed on 2020)\  General    Constitutional: She is oriented to person, place, and time. She appears well-developed and well-nourished.   HENT:   Head: Normocephalic and atraumatic.   Eyes: EOM are normal.   Neck: Neck supple.   Pulmonary/Chest: Effort normal.   Abdominal: She exhibits no distension.   Neurological: She is alert and oriented to person, place, and time. No cranial nerve deficit.   Psychiatric: She has a normal mood and affect.     General Musculoskeletal Exam   Gait: normal     Back (L-Spine & T-Spine) / Neck (C-Spine) Exam     Back (L-Spine & T-Spine) Range of Motion   Extension: normal   Flexion: normal   Lateral bend right: normal   Lateral bend left: normal     Spinal Sensation   Right Side Sensation  L-Spine Level: normal  Left Side Sensation  L-Spine Level: normal    Comments:  Sensation intact to light touch bilateral lower extremities; positive straight leg raise for radicular symptoms bilaterally; increased pain lumbar extension and flexion      Muscle Strength   Right Lower Extremity   Hip Flexion: 5/5   Quadriceps:  5/5   Hamstrin/5   EHL:  5/5  Left Lower Extremity   Hip Flexion: 5/5   Quadriceps:  5/5   Hamstrin/5   EHL:  5/5    Reflexes     Left Side  Quadriceps:  2+  Achilles:  2+  Ankle Clonus:  absent    Right Side   Quadriceps:  2+  Achilles:  2+  Ankle Clonus:  absent      Assessment  Lumbar Spondylosis  Lumbar Radiculopathy    1. 54 y.o. year old patient with PMH of   Past Medical History:   Diagnosis Date    Allergic rhinitis     Anxiety     Asthma     Diabetes mellitus, type 2     Hyperlipidemia     Hypertension     Sarcoidosis       presenting with pain located lumbar spine, bilateral lower extremities  2. Pain Generators / Etiology: Lumbar Radiculopathy and Lumbar Spondylosis  3. Failed Meds (E- Effective, NE- Not " Effective):  Tylenol-minimally effective  4. Physical Therapy - Completed in the Past greater than 6 weeks physical therapy approximately 4 months ago  5. Psychological comorbidities - None  6. Anticoagulants / Antiplatelets: None     PLAN:  1. Medications:  Continue Tylenol over-the-counter not to exceed 3000 mg daily; continue Lyrica 50 mg tablets twice daily  2. PT - patient's completed greater than 6 weeks physical therapy in the last 4 months with minimal symptomatic relief; she continues to walk for exercise and performs physical therapy exercises at home on a daily basis  3. Psychological - none  4. Labs - obtain  none  5. Imaging - none; reviewed lumbar MRI patient today in clinic  6. Interventions - will schedule for bilateral L2-for lumbar medial branch blocks targeting the L3/4-L4/5 facet joints  7. Referrals - will provide referral to physiatry for EMG testing of the bilateral lower extremities due to radicular symptoms in both legs with minimal evidence on lumbar MRI; have also discussed for patient to discuss with Dr. Chavez if further testing is needed regarding renal cyst seen on MRI  8. Records - none  9. Follow up visit - follow up in clinic after the lumbar medial branch blocks and EMG are completed  10. Patient Questions - answered all of the patient's questions regarding diagnosis, therapy, and treatment  11.  This condition does not require this patient to take time off of work    SCOTT Ramesh MD  Interventional Pain  [facility] [place]

## 2020-06-25 ENCOUNTER — TELEPHONE (OUTPATIENT)
Dept: RADIOLOGY | Facility: HOSPITAL | Age: 55
End: 2020-06-25

## 2020-06-26 ENCOUNTER — HOSPITAL ENCOUNTER (OUTPATIENT)
Dept: RADIOLOGY | Facility: HOSPITAL | Age: 55
Discharge: HOME | End: 2020-06-26
Attending: INTERNAL MEDICINE
Payer: COMMERCIAL

## 2020-06-26 DIAGNOSIS — N28.1 RENAL CYST, RIGHT: ICD-10-CM

## 2020-06-26 PROCEDURE — 76770 US RETROPERITONEAL COMPLETE: ICD-10-PCS | Mod: 26,,, | Performed by: RADIOLOGY

## 2020-06-26 PROCEDURE — 76770 US EXAM ABDO BACK WALL COMP: CPT | Mod: TC

## 2020-06-26 PROCEDURE — 76770 US EXAM ABDO BACK WALL COMP: CPT | Mod: 26,,, | Performed by: RADIOLOGY

## 2020-06-29 ENCOUNTER — TELEPHONE (OUTPATIENT)
Dept: PAIN MEDICINE | Facility: CLINIC | Age: 55
End: 2020-06-29

## 2020-06-29 NOTE — TELEPHONE ENCOUNTER
----- Message from Cheryl Valerio sent at 6/29/2020 12:14 PM CDT -----  Pt would like to reschedule appt. Please call back at [phone].  Hui Correia

## 2020-06-29 NOTE — TELEPHONE ENCOUNTER
Pt wanted to cancel her procedure for July 09, due to covid numbers spiking and stated she would cb to r/s. Procedure cancele per her request.  All questions answered

## 2020-07-29 ENCOUNTER — PATIENT MESSAGE (OUTPATIENT)
Dept: OTHER | Facility: OTHER | Age: 55
End: 2020-07-29

## 2020-07-30 ENCOUNTER — OFFICE VISIT (OUTPATIENT)
Dept: INTERNAL MEDICINE | Facility: CLINIC | Age: 55
End: 2020-07-30
Payer: COMMERCIAL

## 2020-07-30 ENCOUNTER — PATIENT MESSAGE (OUTPATIENT)
Dept: OBSTETRICS AND GYNECOLOGY | Facility: CLINIC | Age: 55
End: 2020-07-30

## 2020-07-30 ENCOUNTER — PATIENT OUTREACH (OUTPATIENT)
Dept: OTHER | Facility: OTHER | Age: 55
End: 2020-07-30

## 2020-07-30 DIAGNOSIS — E11.9 TYPE 2 DIABETES MELLITUS WITHOUT COMPLICATION, WITHOUT LONG-TERM CURRENT USE OF INSULIN: ICD-10-CM

## 2020-07-30 DIAGNOSIS — T50.2X5A DIURETIC-INDUCED HYPOKALEMIA: ICD-10-CM

## 2020-07-30 DIAGNOSIS — I10 HYPERTENSION GOAL BP (BLOOD PRESSURE) < 130/80: Primary | Chronic | ICD-10-CM

## 2020-07-30 DIAGNOSIS — E78.2 MIXED HYPERLIPIDEMIA: ICD-10-CM

## 2020-07-30 DIAGNOSIS — E87.6 DIURETIC-INDUCED HYPOKALEMIA: ICD-10-CM

## 2020-07-30 PROCEDURE — 3044F PR MOST RECENT HEMOGLOBIN A1C LEVEL <7.0%: ICD-10-PCS | Mod: CPTII,,, | Performed by: INTERNAL MEDICINE

## 2020-07-30 PROCEDURE — 99214 PR OFFICE/OUTPT VISIT, EST, LEVL IV, 30-39 MIN: ICD-10-PCS | Mod: 95,,, | Performed by: INTERNAL MEDICINE

## 2020-07-30 PROCEDURE — 99214 OFFICE O/P EST MOD 30 MIN: CPT | Mod: 95,,, | Performed by: INTERNAL MEDICINE

## 2020-07-30 PROCEDURE — 3044F HG A1C LEVEL LT 7.0%: CPT | Mod: CPTII,,, | Performed by: INTERNAL MEDICINE

## 2020-07-30 RX ORDER — METFORMIN HYDROCHLORIDE 500 MG/1
TABLET, EXTENDED RELEASE ORAL
Qty: 360 TABLET | Refills: 1 | Status: SHIPPED | OUTPATIENT
Start: 2020-07-30 | End: 2020-09-22

## 2020-07-30 RX ORDER — METFORMIN HYDROCHLORIDE 500 MG/1
TABLET, EXTENDED RELEASE ORAL
Qty: 180 TABLET | Refills: 1 | Status: SHIPPED | OUTPATIENT
Start: 2020-07-30 | End: 2020-07-30

## 2020-07-30 RX ORDER — POTASSIUM CHLORIDE 750 MG/1
10 TABLET, EXTENDED RELEASE ORAL DAILY
Qty: 30 TABLET | Refills: 6 | Status: SHIPPED | OUTPATIENT
Start: 2020-07-30 | End: 2021-06-18

## 2020-07-30 RX ORDER — GLUCOSAM/CHONDRO/HERB 149/HYAL 750-100 MG
TABLET ORAL
COMMUNITY
Start: 2020-07-30 | End: 2021-09-16

## 2020-07-30 NOTE — TELEPHONE ENCOUNTER
Pt states she had to reschedule all procedures due to Coronavirus. Pt is in pain and requesting a refill on medication prescribed for numbness and tingling. Please advise. Thank you!

## 2020-07-30 NOTE — PROGRESS NOTES
Digital Medicine: Clinician Follow-Up    Outreach to discuss message regarding elevated readings.     Patient reports she is positive her readings are accurate and running elevated despite her prior controlled A1C (1 month old). Patient denies changes in diet, stress, or exercise as well as missed doses.     Patient does endorse s/s of hyperglycemia including polydipsia and polyuria. Denies s/s of hypoglycemia.     Not receiving readings at this time - placed CRM. Patient read of FBGs: 223, 233, 164, 200, 183, 255, 225, and 192.     The history is provided by the patient.   Follow-up reason(s): routine follow up.     Readings are trending down      polydipsia and polyuria  Patient is not experiencing signs/symptoms of hypoglycemia.  Patient is experiencing signs/symptoms of hyperglycemia.          Last 6 Patient Entered Readings                                          Most Recent A1c: 6.2% on 6/4/2020  (Goal: 7%)     Recent Readings 7/13/2020 7/12/2020 7/11/2020 7/10/2020 7/9/2020    Blood Glucose (mg/dL) 152 164 100 198 197             Depression Screening  Did not address depression screening.    Sleep Apnea Screening    Did not address sleep apnea screening.     Medication Affordability Screening  Did not address medication affordability screening.     Medication Adherence-Medication adherence was assessed.          ASSESSMENT(S)  Patient's most recent A1C result is Lab Results     Component                Value               Date                     HGBA1C                   6.2 (H)             06/04/2020           and is at goal. Patient's A1C goal is less than or equal to 7.    SMBG indicates significant increase in BGs recently. Patient denies lifestyle changes that could cause elevations. Patient is symptomatic of hyperglycemia. Will increase metformin - renal function appropriate.     PLAN  Medication change: Will INCREASE metformin XR to 1000mg in AM and 500mg in PM nad in 1 week if readings remain  elevated with increased to 1000mg BID.  Placed task for tech support:    Patient verbalizes understanding. Patient did not express questions or concerns and patient has contact information if needed.              Topic    Eye Exam     Urine Protein Check            Diabetes Medications             metFORMIN (GLUCOPHAGE-XR) 500 MG XR 24hr tablet TAKE 1 TABLET(500 MG) BY MOUTH TWICE DAILY WITH MEALS    SITagliptin (JANUVIA) 100 MG Tab Take 1 tablet (100 mg total) by mouth once daily.

## 2020-07-30 NOTE — PROGRESS NOTES
Subjective:       Patient ID: Leydi Quintero is a 54 y.o. female.    Chief Complaint: Follow-up    Leydi Qunitero  54 y.o. Black or  female    Patient presents with:  Follow-up    HPI: Presents for a telemedicine visit.   The patient location is: [place]    The chief complaint leading to consultation is: follow up/lab review    Visit type: audiovisual    Face to Face time with patient: 12 minutes   12 minutes of total time spent on the encounter, which includes face to face time and non-face to face time preparing to see the patient (eg, review of tests), Obtaining and/or reviewing separately obtained history, Documenting clinical information in the electronic or other health record, Independently interpreting results (not separately reported) and communicating results to the patient/family/caregiver, or Care coordination (not separately reported).     Each patient to whom he or she provides medical services by telemedicine is:  (1) informed of the relationship between the physician and patient and the respective role of any other health care provider with respect to management of the patient; and (2) notified that he or she may decline to receive medical services by telemedicine and may withdraw from such care at any time.    HTN--compliant with chlorthalidone. Her potassium has been running slightly low. She reports leg cramps.   Diabetes--stable on metformin and Januvia. She is participating in the Digital Diabetes program.                    HGBA1C                   6.2 (H)             06/04/2020            HLD--compliant with pravastatin.                 LDLCALC                  102.6               06/04/2020                 CHOL                     192                 06/04/2020                 TRIG                     197 (H)             06/04/2020                 HDL                      50                  06/04/2020                 ALT                      14                   06/04/2020                 AST                      14                  06/04/2020                 NA                       142                 06/04/2020                 K                        3.3 (L)             06/04/2020                 CL                       105                 06/04/2020                 CREATININE               1.0                 06/04/2020                 BUN                      9                   06/04/2020                 CO2                      28                  06/04/2020              Past Medical History:  Allergic rhinitis  Anxiety  Asthma  Diabetes mellitus, type 2  Hyperlipidemia  Hypertension  Sarcoidosis    Current Outpatient Medications on File Prior to Visit:  albuterol-ipratropium (DUO-NEB) 2.5 mg-0.5 mg/3 mL nebulizer solution, Take 3 mLs by nebulization every 6 (six) hours as needed for Wheezing. Rescue, Disp: 1 Box, Rfl: 3  blood sugar diagnostic Strp, Once daily glucose testing., Disp: 50 strip, Rfl: 6  chlorthalidone (HYGROTEN) 25 MG Tab, TAKE 1 TABLET(25 MG) BY MOUTH EVERY DAY, Disp: 90 tablet, Rfl: 1  fluticasone (FLONASE) 50 mcg/actuation nasal spray, 1 spray by Each Nare route 2 (two) times daily., Disp: 16 g, Rfl: 3  gabapentin (NEURONTIN) 300 MG capsule, Take 1 capsule at bedtime for one week, then take 1 capsule at breakfast and bedtime for 1 week, then take 1 capsule three times daily., Disp: 90 capsule, Rfl: 3  ipratropium (ATROVENT) 0.02 % nebulizer solution, Take 2.5 mLs (500 mcg total) by nebulization once. Rescue for 1 dose, Disp: 1 vial, Rfl: 0  lancets (LANCETS,THIN) Misc, Once daily glucose testing., Disp: 50 each, Rfl: 6  pravastatin (PRAVACHOL) 80 MG tablet, TAKE 1 TABLET(80 MG) BY MOUTH EVERY EVENING, Disp: 90 tablet, Rfl: 1  pregabalin (LYRICA) 50 MG capsule, Take 1 capsule (50 mg total) by mouth 2 (two) times daily. Take 2 caps qhs or twice daily if tolerated, may cause drowsiness, Disp: 60 capsule, Rfl: 0  promethazine-dextromethorphan  (PROMETHAZINE-DM) 6.25-15 mg/5 mL Syrp, TK   2 TEA PO Q 6 H PRF COUGH, Disp: , Rfl: 0  SITagliptin (JANUVIA) 100 MG Tab, Take 1 tablet (100 mg total) by mouth once daily., Disp: 30 tablet, Rfl: 6  sodium,potassium,mag sulfates (SUPREP BOWEL PREP KIT) 17.5-3.13-1.6 gram SolR, Take 1 Bottle by mouth., Disp: , Rfl:   TRUE METRIX AIR GLUCOSE METER [facility] [place], USE AS DIRECTED TO TEST BLOOD SUGAR ONCE DAILY, Disp: 1 each, Rfl: 0  metFORMIN (GLUCOPHAGE-XR) 500 MG XR 24hr tablet, TAKE 1 TABLET(500 MG) BY MOUTH TWICE DAILY WITH MEALS, Disp: 180 tablet, Rfl: 1    Allergies:  Review of patient's allergies indicates:   -- Lipitor (atorvastatin)     --  Other reaction(s): Muscle pain        Review of Systems   Constitutional: Negative for activity change and unexpected weight change.   HENT: Negative for hearing loss, rhinorrhea and trouble swallowing.    Eyes: Negative for discharge and visual disturbance.   Respiratory: Negative for chest tightness and wheezing.    Cardiovascular: Negative for chest pain and palpitations.   Gastrointestinal: Negative for blood in stool, constipation, diarrhea and vomiting.   Endocrine: Negative for polydipsia and polyuria.   Genitourinary: Negative for difficulty urinating, dysuria, hematuria and menstrual problem.   Musculoskeletal: Negative for arthralgias, joint swelling and neck pain.   Neurological: Negative for weakness and headaches.   Psychiatric/Behavioral: Negative for confusion and dysphoric mood.         Objective:      Physical Exam  Constitutional:       General: She is not in acute distress.     Appearance: She is well-developed.   Pulmonary:      Effort: Pulmonary effort is normal. No respiratory distress.   Neurological:      Mental Status: She is alert and oriented to person, place, and time.   Psychiatric:         Behavior: Behavior normal.         Thought Content: Thought content normal.         Judgment: Judgment normal.         Assessment:       1. Hypertension goal BP (blood  pressure) < 130/80    2. Diuretic-induced hypokalemia    3. Type 2 diabetes mellitus without complication, without long-term current use of insulin    4. Mixed hyperlipidemia        Plan:       Leydi was seen today for follow-up.    Diagnoses and all orders for this visit:    Hypertension goal BP (blood pressure) < 130/80  -     Continue chlorthalidone    Diuretic-induced hypokalemia  -     potassium chloride SA (K-DUR,KLOR-CON) 10 MEQ tablet; Take 1 tablet (10 mEq total) by mouth once daily.    Type 2 diabetes mellitus without complication, without long-term current use of insulin  -     Refill metFORMIN (GLUCOPHAGE-XR) 500 MG XR 24hr tablet; TAKE 1 TABLET(500 MG) BY MOUTH TWICE DAILY WITH MEALS  -     Continue Januvia     Mixed hyperlipidemia  -     Continue pravastatin  -     Add omega 3-dha-epa-fish oil (FISH OIL) 1,000 mg (120 mg-180 mg) Cap; Take one capsule daily.  -     Lifestyle modifications discussed    Labs and f/u in 6 months.

## 2020-08-13 ENCOUNTER — PATIENT OUTREACH (OUTPATIENT)
Dept: OTHER | Facility: OTHER | Age: 55
End: 2020-08-13

## 2020-08-13 NOTE — PROGRESS NOTES
Med change f/u- No answer and no voicemail available.     At last outreach, increased metformin XR to 1000mg BID.    Last 6 Patient Entered Readings                                          Most Recent A1c: 6.2% on 6/4/2020  (Goal: 7%)     Recent Readings 7/13/2020 7/12/2020 7/11/2020 7/10/2020 7/9/2020    Blood Glucose (mg/dL) 152 164 100 198 197        Continues without readings submitting. Tech Support reached out and patient reports she would call back. Will continue to reach out.

## 2020-08-13 NOTE — PROGRESS NOTES
Digital Medicine: Clinician Follow-Up    At last outreach, increased metformin XR to 1000mg BID, but patient only moved up to 1000mg in the AM and 500mg in the PM. Confirms change and tolerability.     Readings still not submitting. Reports fasting readings have been in the 200s.     The history is provided by the patient.   Follow-up reason(s): medication change follow-up.   Patient is not experiencing signs/symptoms of hypoglycemia.      Patient did make medication change.    Is patient tolerating med change? yes            Last 6 Patient Entered Readings                                          Most Recent A1c: 6.2% on 6/4/2020  (Goal: 7%)     Recent Readings 7/13/2020 7/12/2020 7/11/2020 7/10/2020 7/9/2020    Blood Glucose (mg/dL) 152 164 100 198 197             Depression Screening  Did not address depression screening.    Sleep Apnea Screening    Did not address sleep apnea screening.     Medication Affordability Screening  Did not address medication affordability screening.           ASSESSMENT(S)  Patient's A1C goal is less than or equal to 7 per 2020 ADA guidelines. Patient's most recent A1C result is above goal. Lab Results    Component                Value               Date                     HGBA1C                   6.2 (H)             06/04/2020          .        [facility] is not transmitting at this time. At last outreach, entered a CRM - tech support reached out and patient was to call back but has not yet. Patient's reported readings remain elevated. Patient did not complete metformin XR full titration so will continue with this change. May need to consider additional medication at next outreach - informed patient of this. Could consider addition of SGLT2i or changing DPP-IV to GLP-1 if open to injectables.       Diabetes Plan  Medication change. Complete metformin titration to 1000mg BID.   Continue current diet/physical activity routine.  Requested patient reach out to tech support today - confirms  she has the number.        Addressed any questions or concerns and patient has my contact information if needed prior to next outreach. Patient verbalizes understanding.                Topic    Eye Exam     Urine Protein Check            Diabetes Medications             metFORMIN (GLUCOPHAGE-XR) 500 MG XR 24hr tablet TAKE 2 TABLETS (1000 MG) BY MOUTH TWICE DAILY WITH MEALS    SITagliptin (JANUVIA) 100 MG Tab Take 1 tablet (100 mg total) by mouth once daily.

## 2020-08-26 ENCOUNTER — PATIENT OUTREACH (OUTPATIENT)
Dept: OTHER | Facility: OTHER | Age: 55
End: 2020-08-26

## 2020-08-27 NOTE — PROGRESS NOTES
Sent message requesting patient connect with Tech Support for reading transmission issues.     Additionally, asked for update on metformin tolerability.     Will continue to monitor and reach out based on response to message and reading transmission.

## 2020-09-08 ENCOUNTER — PATIENT OUTREACH (OUTPATIENT)
Dept: ADMINISTRATIVE | Facility: HOSPITAL | Age: 55
End: 2020-09-08

## 2020-09-08 NOTE — PROGRESS NOTES
Digital Medicine: Health  Follow-Up    The history is provided by the patient.                     Topics Covered on Call: physical activity and Diet    Additional Follow-up details: Called patient for follow up. She is in the process of getting a new glucometer.    Patient states she has been doing well with no questions or concerns today. Says she is tolerating her medications. Denies symptoms of hypoglycemia.        Diet-no change to diet    No change to diet.  Patient reports eating or drinking the following: Patient states she is having less cravings for sweets. She is buying healthy snacks such as fruit and nuts. Denies any questions surrounding nutrition today.      Physical Activity-no change to routine  No change to exercise routine.       Additional physical activity details: Patient is walking for 30 minutes daily.      Medication Adherence-Medication adherence was assessed.      Substance, Sleep, Stress-Not assessed      Continue current diet/physical activity routine.  Provided patient education.       Addressed any questions or concerns and patient has my contact information if needed prior to next outreach. Patient verbalizes understanding.      Explained the importance of self-monitoring and medication adherence. Encouraged the patient to communicate with their health  for lifestyle modifications to help improve or maintain a healthy lifestyle.                Topic    Eye Exam     Urine Protein Check          Last 6 Patient Entered Readings                                          Most Recent A1c: 6.2% on 6/4/2020  (Goal: 7%)     Recent Readings 7/13/2020 7/12/2020 7/11/2020 7/10/2020 7/9/2020    Blood Glucose (mg/dL) 152 164 100 198 197

## 2020-09-09 ENCOUNTER — OFFICE VISIT (OUTPATIENT)
Dept: INTERNAL MEDICINE | Facility: CLINIC | Age: 55
End: 2020-09-09
Payer: COMMERCIAL

## 2020-09-09 DIAGNOSIS — R09.81 SINUS CONGESTION: Primary | ICD-10-CM

## 2020-09-09 DIAGNOSIS — H10.33 ACUTE BACTERIAL CONJUNCTIVITIS OF BOTH EYES: ICD-10-CM

## 2020-09-09 PROCEDURE — 99213 OFFICE O/P EST LOW 20 MIN: CPT | Mod: 95,,, | Performed by: INTERNAL MEDICINE

## 2020-09-09 PROCEDURE — 99213 PR OFFICE/OUTPT VISIT, EST, LEVL III, 20-29 MIN: ICD-10-PCS | Mod: 95,,, | Performed by: INTERNAL MEDICINE

## 2020-09-09 RX ORDER — POLYMYXIN B SULFATE AND TRIMETHOPRIM 1; 10000 MG/ML; [USP'U]/ML
1 SOLUTION OPHTHALMIC EVERY 4 HOURS
Qty: 2.8 ML | Refills: 0 | Status: SHIPPED | OUTPATIENT
Start: 2020-09-09 | End: 2020-09-16

## 2020-09-09 RX ORDER — MONTELUKAST SODIUM 10 MG/1
10 TABLET ORAL NIGHTLY
Qty: 30 TABLET | Refills: 0 | Status: SHIPPED | OUTPATIENT
Start: 2020-09-09 | End: 2020-10-12

## 2020-09-09 NOTE — PROGRESS NOTES
Subjective:       Patient ID: Leydi Quintero is a 54 y.o. female.    Chief Complaint: Sinus Problem    The patient location is: [place]   The chief complaint leading to consultation is: sinus congestion and red eyes    Visit type: audiovisual    Face to Face time with patient: 6 minutes   6 minutes of total time spent on the encounter, which includes face to face time and non-face to face time preparing to see the patient (eg, review of tests), Obtaining and/or reviewing separately obtained history, Documenting clinical information in the electronic or other health record, Independently interpreting results (not separately reported) and communicating results to the patient/family/caregiver, or Care coordination (not separately reported).     Each patient to whom he or she provides medical services by telemedicine is:  (1) informed of the relationship between the physician and patient and the respective role of any other health care provider with respect to management of the patient; and (2) notified that he or she may decline to receive medical services by telemedicine and may withdraw from such care at any time.    Sinus Problem  This is a new problem. The current episode started in the past 7 days. The problem is unchanged. There has been no fever. The pain is moderate. Associated symptoms include congestion, headaches and sinus pressure. Pertinent negatives include no chills, neck pain or sneezing. Past treatments include spray decongestants. The treatment provided no relief.     Her eyes have been red, itchy and draining. She denies pain.   She states there is a thick, mucous coming out of her eyes.     Review of Systems   Constitutional: Negative for activity change, chills, fever and unexpected weight change.   HENT: Positive for nasal congestion, rhinorrhea and sinus pressure/congestion. Negative for hearing loss, sneezing and trouble swallowing.    Eyes: Positive for discharge, redness and itching.  Negative for visual disturbance.   Respiratory: Negative for chest tightness and wheezing.    Cardiovascular: Negative for chest pain and palpitations.   Gastrointestinal: Negative for blood in stool, constipation, diarrhea and vomiting.   Endocrine: Negative for polydipsia and polyuria.   Genitourinary: Negative for difficulty urinating, dysuria, hematuria and menstrual problem.   Musculoskeletal: Negative for arthralgias, joint swelling and neck pain.   Neurological: Positive for headaches. Negative for weakness.   Psychiatric/Behavioral: Negative for confusion and dysphoric mood.         Objective:      Physical Exam  Constitutional:       General: She is not in acute distress.     Appearance: She is well-developed.   Eyes:      Conjunctiva/sclera:      Right eye: Right conjunctiva is injected.      Left eye: Left conjunctiva is injected.   Pulmonary:      Effort: Pulmonary effort is normal. No respiratory distress.   Neurological:      Mental Status: She is alert and oriented to person, place, and time.   Psychiatric:         Behavior: Behavior normal.         Thought Content: Thought content normal.         Judgment: Judgment normal.         Assessment:       1. Sinus congestion    2. Acute bacterial conjunctivitis of both eyes        Plan:       Leydi was seen today for sinus problem.    Diagnoses and all orders for this visit:    Sinus congestion  -     montelukast (SINGULAIR) 10 mg tablet; Take 1 tablet (10 mg total) by mouth every evening.  -     Continue Flonase     Acute bacterial conjunctivitis of both eyes  -     polymyxin B sulf-trimethoprim (POLYTRIM) 10,000 unit- 1 mg/mL Drop; Place 1 drop into the right eye every 4 (four) hours. for 7 days  -     Recommend cool compresses  -     Handout provided    RTC if symptoms worsen or fail to resolve with treatment.

## 2020-09-09 NOTE — PATIENT INSTRUCTIONS
Conjunctivitis, Nonspecific    The membrane that covers the white part of your eye (the conjunctiva) is inflamed. Inflammation happens when your body responds to an injury, allergic reaction, infection, or illness. Symptoms of inflammation in the eye may include redness, irritation, itching, swelling, or burning. These symptoms should go away within the next 24 hours. Conjunctivitis may be related to a particle that was in your eye. If so, it may wash out with your tears or irrigation treatment. Being exposed to liquid chemicals or fumes may also cause this reaction.   Home care  · Apply a cold pack (ice in a plastic bag, wrapped in a towel) over the eye for 20 minutes at a time. This will reduce pain.  · Artificial tears may be prescribed to reduce irritation or redness.  These should be used 3 to 4 times a day.  · You may use acetaminophen or ibuprofen to control pain, unless another medicine was prescribed.(Note: If you have chronic liver or kidney disease, or if you have ever had a stomach ulcer or gastrointestinal bleeding, talk with your healthcare provider before using these medicines.)  Follow-up care  Follow up with your healthcare provider, or as advised.  When to seek medical advice  Call your healthcare provider right away if any of these occur:  · Increased eyelid swelling  · Increased eye pain  · Increased redness or drainage from the eye  · Increased blurry vision or increased sensitivity to light  · Failure of normal vision to return within 24 to 48 hours  Date Last Reviewed: 6/14/2015  © 5893-6940 [employer]. [address] [place] [address], [place] [address]. All rights reserved. This information is not intended as a substitute for professional medical care. Always follow your healthcare professional's instructions.

## 2020-09-17 NOTE — PROGRESS NOTES
No BG readings in the past 2 months. Notes indicate patient getting a new glucometer. Will defer outreach and continue to monitor for readings.     HC note indicates tolerating medication.

## 2020-09-19 ENCOUNTER — PATIENT MESSAGE (OUTPATIENT)
Dept: OTHER | Facility: OTHER | Age: 55
End: 2020-09-19

## 2020-09-21 ENCOUNTER — PATIENT OUTREACH (OUTPATIENT)
Dept: OTHER | Facility: OTHER | Age: 55
End: 2020-09-21

## 2020-09-21 DIAGNOSIS — E11.9 TYPE 2 DIABETES MELLITUS WITHOUT COMPLICATION, WITHOUT LONG-TERM CURRENT USE OF INSULIN: ICD-10-CM

## 2020-09-22 RX ORDER — DULAGLUTIDE 0.75 MG/.5ML
0.75 INJECTION, SOLUTION SUBCUTANEOUS
Qty: 4 PEN | Refills: 2 | Status: SHIPPED | OUTPATIENT
Start: 2020-09-22 | End: 2020-10-15

## 2020-09-22 RX ORDER — METFORMIN HYDROCHLORIDE 500 MG/1
TABLET, EXTENDED RELEASE ORAL
Qty: 360 TABLET | Refills: 1
Start: 2020-09-22 | End: 2020-10-15

## 2020-09-22 NOTE — PROGRESS NOTES
Digital Medicine: Clinician Follow-Up    Patient reporting GI upset with metformin after continued use and taking with food.     Self-reduced dose to 500mg BID. Tolerating better but still having regular GI upset.     She received her new glucometer but has not had the chance to set it up to send in readings. Reports FBGs of 160-170s.     The history is provided by the patient.      Review of patient's allergies indicates:   -- Lipitor (atorvastatin)     --  Other reaction(s): Muscle pain    Hypertension  Patient needs assistance troubleshooting: tech support needed.          Last 6 Patient Entered Readings                                          Most Recent A1c: 6.2% on 6/4/2020  (Goal: 7%)     Recent Readings 7/13/2020 7/12/2020 7/11/2020 7/10/2020 7/9/2020    Blood Glucose (mg/dL) 152 164 100 198 197             Screenings    ASSESSMENT(S)  Patient's A1C goal is less than or equal to 7 per 2020 ADA guidelines. Patient's most recent A1C result is at goal. Lab Results    Component                Value               Date                     HGBA1C                   6.2 (H)             06/04/2020          .       A1C is controlled but current SMBG indicates uncontrolled DM at this time.     Unable to tolerate metformin. Patient would prefer to stop entirely given routine GI upset even on moderate dose. Agreeable to continue while starting new medication. Will plan to stop metformin in the future, if responding to GLP-1 appropriately.     Discussed SGLT2i - history of UTI and yeast infections per patient. Interested in GLP-1 for weight loss principles.       Diabetes Plan  Medication change. STOP Januvia. Continue reduce metformin 500mg BID. Start Trulicity 0.75mg once weekly.   Continue current diet/physical activity routine.  Placed task for tech support. Needs assistance setting up glucometer.   Provided patient education.  - Educated on expectations and ADRs with GLP-1. Discussed administration. Reviewed. Will  send video. Patient to call, if any questions for injection technique.    Will follow-up in 2 weeks.      Addressed patient questions and patient has my contact information if needed prior to next outreach. Patient verbalizes understanding.                Topic    Eye Exam     Urine Protein Check      There are no preventive care reminders to display for this patient.      Diabetes Medications             metFORMIN (GLUCOPHAGE-XR) 500 MG XR 24hr tablet TAKE 2 TABLETS (1000 MG) BY MOUTH TWICE DAILY WITH MEALS    SITagliptin (JANUVIA) 100 MG Tab Take 1 tablet (100 mg total) by mouth once daily.

## 2020-10-08 ENCOUNTER — PATIENT OUTREACH (OUTPATIENT)
Dept: OTHER | Facility: OTHER | Age: 55
End: 2020-10-08

## 2020-10-08 NOTE — PROGRESS NOTES
Med change follow-up. No answer - unable to leave VM. Will send my chart message - has been successful in the past.     At last outreach, stopped Januvia in favor of Trulicity. Pplan to stop metformin after continued Trulicity use due to intolerable ADRs.     Last 6 Patient Entered Readings                                          Most Recent A1c: 6.2% on 6/4/2020  (Goal: 7%)     Recent Readings 7/13/2020 7/12/2020 7/11/2020 7/10/2020 7/9/2020    Blood Glucose (mg/dL) 152 164 100 198 197        No readings available at this time. CRM entered at last outreach.     Will monitor for tolerability and ask for verbal report of readings.     Will continue to reach out.

## 2020-10-08 NOTE — PROGRESS NOTES
"Digital Medicine: Clinician Follow-Up    At last outreach, stopped Januvia in favor of Trulicity. Plan to stop metformin after continued Trulicity use due to intolerable ADRs.    Patient confirms change and tolerability. Patient notes she looks forward to her Trulicity injection despite not liking needles because she feels so much better on it. She notes a significant decrease in her appetite which helps her to avoid cravings and make healthier food choices despite having the option to eat unhealthy options. "Feels like a new person."    Readings still working on submitting with Tech. Patient reports FBGs are routinely 150-160 with a low of 110.     The history is provided by the patient.   Follow-up reason(s): medication change follow-up.     Hypertension    Readings are trending down due to medication adherence.     Patient is not experiencing signs/symptoms of hypoglycemia.      Patient did make medication change.    Is patient tolerating med change? yes              Last 6 Patient Entered Readings                                          Most Recent A1c: 6.2% on 6/4/2020  (Goal: 7%)     Recent Readings 7/13/2020 7/12/2020 7/11/2020 7/10/2020 7/9/2020    Blood Glucose (mg/dL) 152 164 100 198 197               Depression Screening  Did not address depression screening.    Sleep Apnea Screening    Did not address sleep apnea screening.     Medication Affordability Screening  Did not address medication affordability screening.     Medication Adherence-Medication adherence was assessed.          ASSESSMENT(S)  Patient's A1C goal is less than or equal to 7. Patient's most recent A1C result is at goal. Lab Results    Component                Value               Date                     HGBA1C                   6.2 (H)             06/04/2020          .       A1c remains controlled but SMBG is uncontrolled at this time. Working on stopping metformin in favor of GLP1 titration due to metformin ADRs.     Will call back " next week (4th dose is Tuesday) and stop metformin and increase Trulicity to 1.5mg weekly.       Diabetes Plan  Continue current therapy.  Continue current diet/physical activity routine.  Will monitor next week for change in regimen.      Addressed patient questions and patient has my contact information if needed prior to next outreach. Patient verbalizes understanding.                 Topic    Eye Exam     Urine Protein Check      There are no preventive care reminders to display for this patient.        Diabetes Medications             dulaglutide (TRULICITY) 0.75 mg/0.5 mL pen injector Inject 0.75 mg into the skin every 7 days. Replaces Januvia.    metFORMIN (GLUCOPHAGE-XR) 500 MG ER 24hr tablet TAKE 1 TABLET BY MOUTH TWICE DAILY WITH MEALS

## 2020-10-15 ENCOUNTER — PATIENT OUTREACH (OUTPATIENT)
Dept: OTHER | Facility: OTHER | Age: 55
End: 2020-10-15

## 2020-10-15 DIAGNOSIS — E11.9 TYPE 2 DIABETES MELLITUS WITHOUT COMPLICATION, WITHOUT LONG-TERM CURRENT USE OF INSULIN: Primary | ICD-10-CM

## 2020-10-15 RX ORDER — DULAGLUTIDE 1.5 MG/.5ML
1.5 INJECTION, SOLUTION SUBCUTANEOUS WEEKLY
Qty: 4 PEN | Refills: 3 | Status: SHIPPED | OUTPATIENT
Start: 2020-10-15 | End: 2021-01-14

## 2020-10-15 NOTE — PROGRESS NOTES
Digital Medicine: Clinician Follow-Up    Patient continues to report doing well.     Continue to not receive readings with new device - patient will call tech support today. FBGs are typically 140-160 with occasional 113 per patient.     Took 4th dose of Trulicity on Tuesday.     The history is provided by the patient.   Follow-up reason(s): routine follow up.     Diabetes    Readings are trending down due to medication adherence.     Patient is not experiencing signs/symptoms of hypoglycemia.              Last 6 Patient Entered Readings                                          Most Recent A1c: 6.2% on 6/4/2020  (Goal: 7%)     Recent Readings 7/13/2020 7/12/2020 7/11/2020 7/10/2020 7/9/2020    Blood Glucose (mg/dL) 152 164 100 198 197               Depression Screening  Did not address depression screening.    Sleep Apnea Screening    Did not address sleep apnea screening.     Medication Affordability Screening  Did not address medication affordability screening.     Medication Adherence-Medication adherence was assessed.          ASSESSMENT(S)  Patient's A1C goal is less than or equal to 7. Patient's most recent A1C result is at goal. Lab Results    Component                Value               Date                     HGBA1C                   6.2 (H)             06/04/2020          .       SMBG remains elevated.     Will stop metformin due to ADRs and titrate GLP-1.       Diabetes Plan  Medication change. STOP metformin. INCREASE (next Tuesday) to Trulicity 1.5mg daily   Placed task for tech support. Readings not transmitting.   Will monitor in 2 weeks.      Addressed patient questions and patient has my contact information if needed prior to next outreach. Patient verbalizes understanding.                 Topic    Eye Exam     Urine Protein Check      There are no preventive care reminders to display for this patient.        Diabetes Medications             dulaglutide (TRULICITY) 0.75 mg/0.5 mL pen injector  Inject 0.75 mg into the skin every 7 days. Replaces Januvia.    metFORMIN (GLUCOPHAGE-XR) 500 MG ER 24hr tablet TAKE 1 TABLET BY MOUTH TWICE DAILY WITH MEALS

## 2020-10-24 ENCOUNTER — IMMUNIZATION (OUTPATIENT)
Dept: INTERNAL MEDICINE | Facility: CLINIC | Age: 55
End: 2020-10-24
Payer: COMMERCIAL

## 2020-10-24 PROCEDURE — 90686 IIV4 VACC NO PRSV 0.5 ML IM: CPT | Mod: S$GLB,,, | Performed by: FAMILY MEDICINE

## 2020-10-24 PROCEDURE — 90471 IMMUNIZATION ADMIN: CPT | Mod: S$GLB,,, | Performed by: FAMILY MEDICINE

## 2020-10-24 PROCEDURE — 90686 FLU VACCINE (QUAD) GREATER THAN OR EQUAL TO 3YO PRESERVATIVE FREE IM: ICD-10-PCS | Mod: S$GLB,,, | Performed by: FAMILY MEDICINE

## 2020-10-24 PROCEDURE — 90471 FLU VACCINE (QUAD) GREATER THAN OR EQUAL TO 3YO PRESERVATIVE FREE IM: ICD-10-PCS | Mod: S$GLB,,, | Performed by: FAMILY MEDICINE

## 2020-10-26 ENCOUNTER — PATIENT OUTREACH (OUTPATIENT)
Dept: OTHER | Facility: OTHER | Age: 55
End: 2020-10-26

## 2020-10-29 NOTE — PROGRESS NOTES
"Digital Medicine: Health  Follow-Up    The history is provided by the patient.             Reason for review: Blood glucose at goal  Care Team received low BG alert.          Topics Covered on Call: physical activity and Diet    Additional Follow-up details: Called for follow up and to address low BG alerts. Low BG 35 on 10/5, Low BG 36 on 10/6. Patient says she thinks she did not get enough blood on the strip. Will delete inaccurate readings.    Patient states she really thinks Trulicity is working well for her. She says overall, feels much better on this medication compared to metformin. She also thinks it is helping her appetite.            Diet-no change to diet    No change to diet.  Patient reports eating or drinking the following: Still including healthier foods and working on portion control. Patient says some days she is hungrier than others. On the days she may eat more, patient states she really focuses on eating "the right things".      Physical Activity-Change      Additional physical activity details: Patient says she is walking a little less than before. Around once or twice a week for 30 minutes. Although says she wants to start "bumping it up".       Medication Adherence-Medication adherence was assessed.      Substance, Sleep, Stress-Not assessed      Continue current diet/physical activity routine.       Addressed patient questions and patient has my contact information if needed prior to next outreach. Patient verbalizes understanding.      Explained the importance of self-monitoring and medication adherence. Encouraged the patient to communicate with their health  for lifestyle modifications to help improve or maintain a healthy lifestyle.                   Topic    Eye Exam     Urine Protein Check            Last 6 Patient Entered Readings                                          Most Recent A1c: 6.2% on 6/4/2020  (Goal: 8%)     Recent Readings 10/24/2020 10/24/2020 10/23/2020 " 10/23/2020 10/22/2020    Blood Glucose (mg/dL) 168 163 182 178 131

## 2020-10-30 ENCOUNTER — PATIENT OUTREACH (OUTPATIENT)
Dept: OTHER | Facility: OTHER | Age: 55
End: 2020-10-30

## 2020-10-30 NOTE — PROGRESS NOTES
Digital Medicine: Clinician Follow-Up    At last outreach, increased Trulicity 1.5mg dose. Patient confirms compliance and tolerability. Has had 2 doses of increased dose at this time.       The history is provided by the patient.   Follow-up reason(s): medication change follow-up.     Diabetes    Readings are trending down due to medication adherence.       Patient did make medication change.    Is patient tolerating med change? yes              Last 6 Patient Entered Readings                                          Most Recent A1c: 6.2% on 6/4/2020  (Goal: 7%)     Recent Readings 10/29/2020 10/24/2020 10/24/2020 10/23/2020 10/23/2020    Blood Glucose (mg/dL) 148 168 163 182 178               Depression Screening  Did not address depression screening.    Sleep Apnea Screening    Did not address sleep apnea screening.     Medication Affordability Screening  Did not address medication affordability screening.     Medication Adherence-Medication adherence was assessed.          ASSESSMENT(S)  Patient's A1C goal is less than or equal to 7. Patient's most recent A1C result is at goal. Lab Results    Component                Value               Date                     HGBA1C                   6.2 (H)             06/04/2020          .       A1C is controlled and SMBG is trending down with titration of Trulicity.      Diabetes Plan  Prescribed SMBG testing frequency. Recommended 2-3 FBGs per week at this time.   Additional monitoring needed.  Continue current therapy.  Continue current diet/physical activity routine.  Will monitor in 1-2 months.      Addressed patient questions and patient has my contact information if needed prior to next outreach. Patient verbalizes understanding.                 Topic    Eye Exam     Urine Protein Check      There are no preventive care reminders to display for this patient.        Diabetes Medications             dulaglutide (TRULICITY) 1.5 mg/0.5 mL pen injector Inject 1.5 mg into  the skin once a week. Dose Increase.

## 2020-11-02 ENCOUNTER — PATIENT MESSAGE (OUTPATIENT)
Dept: ADMINISTRATIVE | Facility: OTHER | Age: 55
End: 2020-11-02

## 2020-11-05 ENCOUNTER — PATIENT OUTREACH (OUTPATIENT)
Dept: ADMINISTRATIVE | Facility: HOSPITAL | Age: 55
End: 2020-11-05

## 2020-11-05 NOTE — PROGRESS NOTES
Working [employer] HTN report.     Requested updated bp reading. States has not checked it recently but would check it and give to me tomorrow-11/06/20

## 2020-11-05 NOTE — TELEPHONE ENCOUNTER
----- Message from Miranda Chavez DO sent at 5/28/2020  8:37 AM CDT -----  Please schedule appt with pain management.    Hospitalist

## 2020-11-24 ENCOUNTER — PATIENT OUTREACH (OUTPATIENT)
Dept: OTHER | Facility: OTHER | Age: 55
End: 2020-11-24

## 2020-11-24 ENCOUNTER — TELEPHONE (OUTPATIENT)
Dept: INTERNAL MEDICINE | Facility: CLINIC | Age: 55
End: 2020-11-24

## 2020-11-24 NOTE — PROGRESS NOTES
Digital Medicine: Clinician Follow-Up    Patient reports doing well. Denies issues or concerns at this time.     Tolerating Trulicity well. Notes her readings remain above goal.     The history is provided by the patient.   Follow-up reason(s): routine follow up.     Hypertension    Readings are trending up   Additional Follow-up details: Food Recall:  B: egg mc muffin, boiled egg + fruit  L: meat + 2 veg (corn, potatoes, mustard greens), tacos, occ. Fried chicken  D: meat and veg as above  Snacks: pecans, cashews, yogurt, occ candy bar  Sweets: does have a sweet tooth and finds this is a problem  Nai: H20 and coke zero (more coke zero than water daily)            Last 6 Patient Entered Readings                                          Most Recent A1c: 6.2% on 6/4/2020  (Goal: 7%)     Recent Readings 11/21/2020 11/20/2020 11/19/2020 11/18/2020 11/17/2020    Blood Glucose (mg/dL) 170 212 175 194 172               Depression Screening  Did not address depression screening.    Sleep Apnea Screening    Did not address sleep apnea screening.     Medication Affordability Screening  Did not address medication affordability screening.     Medication Adherence-Medication adherence was assessed.          ASSESSMENT(S)  Patient's A1C goal is less than or equal to 7. Patient's most recent A1C result is above goal. Lab Results    Component                Value               Date                     HGBA1C                   6.2 (H)             06/04/2020          .        A1C is controlled but SMBG shows uncontrolled readings. Will work on diet and monitor prandially.       Diabetes Plan  Prescribed SMBG testing frequency. FBG 3-4 times per week and before lunch, dinner, and bed each once weekly  Continue current therapy.  Provided patient education.  - Reviewed keys to healthy diabetic diet - specifically discussing her diet in regards to changes. Discussed limiting fast food and eating out. Discussed portion control on fruit  and discussed smoothies. Discussed other breakfast options. Reviewed starchy vegetables versus non starchy vegetables in addition to typical lunch and dinner starches. Discussed impact of sugar substitutes on cravings. Discussed alternatives. Patient knows to reduce refined sugars.     Will continue to monitor with dietary improvement and additional monitoring times. May need to titrate Trulicity.      Addressed patient questions and patient has my contact information if needed prior to next outreach. Patient verbalizes understanding.      Explained the importance of self-monitoring and medication adherence. Encouraged the patient to communicate with their health  for lifestyle modifications to help improve or maintain a healthy lifestyle.                   Topic    Eye Exam     Urine Protein Check      There are no preventive care reminders to display for this patient.        Diabetes Medications             dulaglutide (TRULICITY) 1.5 mg/0.5 mL pen injector Inject 1.5 mg into the skin once a week. Dose Increase.

## 2020-11-25 ENCOUNTER — PATIENT MESSAGE (OUTPATIENT)
Dept: INTERNAL MEDICINE | Facility: CLINIC | Age: 55
End: 2020-11-25

## 2020-11-25 ENCOUNTER — OFFICE VISIT (OUTPATIENT)
Dept: INTERNAL MEDICINE | Facility: CLINIC | Age: 55
End: 2020-11-25
Payer: COMMERCIAL

## 2020-11-25 DIAGNOSIS — J01.90 ACUTE BACTERIAL SINUSITIS: Primary | ICD-10-CM

## 2020-11-25 DIAGNOSIS — J30.9 CHRONIC ALLERGIC RHINITIS: ICD-10-CM

## 2020-11-25 DIAGNOSIS — B96.89 ACUTE BACTERIAL SINUSITIS: Primary | ICD-10-CM

## 2020-11-25 PROCEDURE — 99213 OFFICE O/P EST LOW 20 MIN: CPT | Mod: 95,,, | Performed by: INTERNAL MEDICINE

## 2020-11-25 PROCEDURE — 99213 PR OFFICE/OUTPT VISIT, EST, LEVL III, 20-29 MIN: ICD-10-PCS | Mod: 95,,, | Performed by: INTERNAL MEDICINE

## 2020-11-25 RX ORDER — POLYMYXIN B SULFATE AND TRIMETHOPRIM 1; 10000 MG/ML; [USP'U]/ML
1 SOLUTION OPHTHALMIC EVERY 6 HOURS
COMMUNITY
End: 2022-01-20 | Stop reason: SDUPTHER

## 2020-11-25 RX ORDER — AZITHROMYCIN 250 MG/1
TABLET, FILM COATED ORAL
Qty: 6 TABLET | Refills: 0 | Status: SHIPPED | OUTPATIENT
Start: 2020-11-25 | End: 2020-11-30

## 2020-11-25 RX ORDER — POLYMYXIN B SULFATE AND TRIMETHOPRIM 1; 10000 MG/ML; [USP'U]/ML
1 SOLUTION OPHTHALMIC EVERY 6 HOURS
OUTPATIENT
Start: 2020-11-25

## 2020-11-25 RX ORDER — METHYLPREDNISOLONE 4 MG/1
TABLET ORAL
Qty: 1 PACKAGE | Refills: 0 | Status: SHIPPED | OUTPATIENT
Start: 2020-11-25 | End: 2021-01-19

## 2020-11-25 NOTE — PROGRESS NOTES
Subjective:       Patient ID: Leydi Quintero is a 54 y.o. female.    Chief Complaint: Sinus Problem    The patient location is: [place]   The chief complaint leading to consultation is: sinus congestion/eye redness     Visit type: audiovisual    Face to Face time with patient: 5 minutes   5 minutes of total time spent on the encounter, which includes face to face time and non-face to face time preparing to see the patient (eg, review of tests), Obtaining and/or reviewing separately obtained history, Documenting clinical information in the electronic or other health record, Independently interpreting results (not separately reported) and communicating results to the patient/family/caregiver, or Care coordination (not separately reported).     Each patient to whom he or she provides medical services by telemedicine is:  (1) informed of the relationship between the physician and patient and the respective role of any other health care provider with respect to management of the patient; and (2) notified that he or she may decline to receive medical services by telemedicine and may withdraw from such care at any time.    Notes:     Sinus Problem  This is a recurrent problem. The current episode started 1 to 4 weeks ago. The problem has been gradually worsening since onset. The pain is moderate. Associated symptoms include congestion, headaches and sinus pressure. Pertinent negatives include no coughing, neck pain or shortness of breath. Past treatments include spray decongestants (Singulair). The treatment provided mild relief.     Review of Systems   Constitutional: Negative for activity change, fever and unexpected weight change.   HENT: Positive for nasal congestion and sinus pressure/congestion. Negative for hearing loss, rhinorrhea and trouble swallowing.    Eyes: Positive for discharge and redness. Negative for visual disturbance.   Respiratory: Negative for cough, chest tightness, shortness of breath and  wheezing.    Cardiovascular: Negative for chest pain and palpitations.   Gastrointestinal: Negative for blood in stool, constipation, diarrhea and vomiting.   Endocrine: Negative for polydipsia and polyuria.   Genitourinary: Negative for difficulty urinating, dysuria, hematuria and menstrual problem.   Musculoskeletal: Negative for arthralgias, joint swelling and neck pain.   Neurological: Positive for headaches. Negative for weakness.   Psychiatric/Behavioral: Negative for confusion and dysphoric mood.         Objective:      Physical Exam  Constitutional:       General: She is not in acute distress.     Appearance: She is well-developed.   Pulmonary:      Effort: Pulmonary effort is normal. No respiratory distress.   Neurological:      Mental Status: She is alert and oriented to person, place, and time.   Psychiatric:         Behavior: Behavior normal.         Thought Content: Thought content normal.         Judgment: Judgment normal.         Assessment:       1. Acute bacterial sinusitis    2. Chronic allergic rhinitis        Plan:       Leydi was seen today for sinus problem.    Diagnoses and all orders for this visit:    Acute bacterial sinusitis  -     azithromycin (Z-JOSE) 250 MG tablet; Take 2 tablets by mouth on day 1; Take 1 tablet by mouth on days 2-5  -     methylPREDNISolone (MEDROL DOSEPACK) 4 mg tablet; Take as directed  -     Continue Fonase and Singulair  -     Handout provided     Chronic allergic rhinitis  -     methylPREDNISolone (MEDROL DOSEPACK) 4 mg tablet; Take as directed    Advised to closely monitor blood glucoses and follow her diabetic diet while taking steroids.     RTC if symptoms worsen or fail to resolve.

## 2020-11-25 NOTE — PATIENT INSTRUCTIONS
Sinusitis (Antibiotic Treatment)    The sinuses are air-filled spaces within the bones of the face. They connect to the inside of the nose. Sinusitis is an inflammation of the tissue lining the sinus cavity. Sinus inflammation can occur during a cold. It can also be due to allergies to pollens and other particles in the air. Sinusitis can cause symptoms of sinus congestion and fullness. A sinus infection causes fever, headache and facial pain. There is often green or yellow drainage from the nose or into the back of the throat (post-nasal drip). You have been given antibiotics to treat this condition.  Home care:  · Take the full course of antibiotics as instructed. Do not stop taking them, even if you feel better.  · Drink plenty of water, hot tea, and other liquids. This may help thin mucus. It also may promote sinus drainage.  · Heat may help soothe painful areas of the face. Use a towel soaked in hot water. Or,  the shower and direct the hot spray onto your face. Using a vaporizer along with a menthol rub at night may also help.   · An expectorant containing guaifenesin may help thin the mucus and promote drainage from the sinuses.  · Over-the-counter decongestants may be used unless a similar medicine was prescribed. Nasal sprays work the fastest. Use one that contains phenylephrine or oxymetazoline. First blow the nose gently. Then use the spray. Do not use these medicines more often than directed on the label or symptoms may get worse. You may also use tablets containing pseudoephedrine. Avoid products that combine ingredients, because side effects may be increased. Read labels. You can also ask the pharmacist for help. (NOTE: Persons with high blood pressure should not use decongestants. They can raise blood pressure.)  · Over-the-counter antihistamines may help if allergies contributed to your sinusitis.    · Do not use nasal rinses or irrigation during an acute sinus infection, unless told to by  your health care provider. Rinsing may spread the infection to other sinuses.  · Use acetaminophen or ibuprofen to control pain, unless another pain medicine was prescribed. (If you have chronic liver or kidney disease or ever had a stomach ulcer, talk with your doctor before using these medicines. Aspirin should never be used in anyone under 18 years of age who is ill with a fever. It may cause severe liver damage.)  · Don't smoke. This can worsen symptoms.  Follow-up care  Follow up with your healthcare provider or our staff if you are not improving within the next week.  When to seek medical advice  Call your healthcare provider if any of these occur:  · Facial pain or headache becoming more severe  · Stiff neck  · Unusual drowsiness or confusion  · Swelling of the forehead or eyelids  · Vision problems, including blurred or double vision  · Fever of 100.4ºF (38ºC) or higher, or as directed by your healthcare provider  · Seizure  · Breathing problems  · Symptoms not resolving within 10 days  Date Last Reviewed: 4/13/2015  © 5442-3351 [employer]. [address] [place] [address]. All rights reserved. This information is not intended as a substitute for professional medical care. Always follow your healthcare professional's instructions.        Allergic Rhinitis  Allergic rhinitis is an allergic reaction that affects the nose, and often the eyes. Its often known as nasal allergies. Nasal allergies are often due to things in the environment that are breathed in. Depending what you are sensitive to, nasal allergies may occur only during certain seasons. Or they may occur year round. Common indoor allergens include house dust mites, mold, cockroaches, and pet dander. Outdoor allergens include pollen from trees, grasses, and weeds.   Symptoms include a drippy, stuffy, and itchy nose. They also include sneezing and red and itchy eyes. You may feel tired more often. Severe allergies may also  affect your breathing and trigger a condition called asthma.   Tests can be done to see what allergens are affecting you. You may be referred to an allergy specialist for testing and further evaluation.  Home care  Your healthcare provider may prescribe medicines to help relieve allergy symptoms. These may include oral medicines, nasal sprays, or eye drops.  Ask your provider for advice on how to avoid substances that you are allergic to. Below are a few tips for each type of allergen.  Pet dander:  · Do not have pets with fur and feathers.  · If you can't avoid having a pet, keep it out of your bedroom and off upholstered furniture.  Pollen:  · When pollen counts are high, keep windows of your car and home closed. If possible, use an air conditioner instead.  · Wear a filter mask when mowing or doing yard work.  House dust mites:  · Wash bedding every week in warm water and detergent and dry on a hot setting.  · Cover the mattress, box spring, and pillows with allergy covers.   · If possible, sleep in a room with no carpet, curtains, or upholstered furniture.  Cockroaches:  · Store food in sealed containers.  · Remove garbage from the home promptly.  · Fix water leaks  Mold:  · Keep humidity low by using a dehumidifier or air conditioner. Keep the dehumidifier and air conditioner clean and free of mold.  · Clean moldy areas with bleach and water.  In general:  · Vacuum once or twice a week. If possible, use a vacuum with a high-efficiency particulate air (HEPA) filter.  · Do not smoke. Avoid cigarette smoke. Cigarette smoke is an irritant that can make symptoms worse.  Follow-up care  Follow up as advised by the healthcare provider or our staff. If you were referred to an allergy specialist, make this appointment promptly.  When to seek medical advice  Call your healthcare provider right away if the following occur:  · Coughing or wheezing  · Fever greater than 100.4°F (38°C)  · Hives (raised red  bumps)  · Continuing symptoms, new symptoms, or worsening symptoms  Call 911 right away if you have:  · Trouble breathing  · Severe swelling of the face or severe itching of the eyes or mouth  Date Last Reviewed: 3/1/2017  © 6628-9473 [employer]. [address] [place] [address]. All rights reserved. This information is not intended as a substitute for professional medical care. Always follow your healthcare professional's instructions.

## 2020-11-30 ENCOUNTER — PATIENT OUTREACH (OUTPATIENT)
Dept: OTHER | Facility: OTHER | Age: 55
End: 2020-11-30

## 2020-11-30 NOTE — PROGRESS NOTES
Digital Medicine: Health  Follow-Up    The history is provided by the patient.             Reason for review: Blood glucose not at goal  Care Team received low BG alert.          Topics Covered on Call: physical activity and Diet    Additional Follow-up details: Called to address low BG alert 36 11/24. Subsequent reading was 147. Patient states this was an error and she may have not had enough blood on the strip. Deleted inaccurate reading.       Patient reports her urine has a strong ammonia smell. She wonders if this has to do with diabetes medications she is taking. Patient reports she does try to stay hydrated, and feels like no matter how much water she drinks it still smells strong. Also discussed what foods can cause the ammonia smell. Encouraged her to speak with her doctor. Also will forward information to clinician.                 Diet-no change to diet  24 hour dietary recall  No change to diet.  Breakfast is typically between. Oatmeal and boiled egg.  Lunch is typically between. Dexter slaw and chicken .   Dinner is typically between. Skip.   Patient reports eating or drinking the following: Patient states she got a little off track during Thanksgiving, although is focusing on healthy eating now.       Physical Activity-Not assessed    Medication Adherence-Medication Adherence not addressed.      Substance, Sleep, Stress-Not assessed      Continue current diet/physical activity routine.  Provided patient education.       Addressed patient questions and patient has my contact information if needed prior to next outreach. Patient verbalizes understanding.      Explained the importance of self-monitoring and medication adherence. Encouraged the patient to communicate with their health  for lifestyle modifications to help improve or maintain a healthy lifestyle.                   Topic    Eye Exam     Urine Protein Check     Hemoglobin A1C            Last 6 Patient Entered Readings                                           Most Recent A1c: 6.2% on 6/4/2020  (Goal: 8%)     Recent Readings 11/26/2020 11/24/2020 11/23/2020 11/22/2020 11/21/2020    Blood Glucose (mg/dL) 168 147 200 154 170

## 2020-12-15 ENCOUNTER — PATIENT MESSAGE (OUTPATIENT)
Dept: OTHER | Facility: OTHER | Age: 55
End: 2020-12-15

## 2020-12-21 ENCOUNTER — PATIENT MESSAGE (OUTPATIENT)
Dept: ADMINISTRATIVE | Facility: OTHER | Age: 55
End: 2020-12-21

## 2020-12-21 DIAGNOSIS — E11.9 TYPE 2 DIABETES MELLITUS WITHOUT COMPLICATION, WITHOUT LONG-TERM CURRENT USE OF INSULIN: Primary | ICD-10-CM

## 2020-12-22 ENCOUNTER — PATIENT OUTREACH (OUTPATIENT)
Dept: OTHER | Facility: OTHER | Age: 55
End: 2020-12-22

## 2020-12-22 NOTE — PROGRESS NOTES
Digital Medicine: Clinician Follow-Up    Did not receive reply to earlier message from today. Reached out to ensure patient comfortable as we go into holiday.     Patient denies s/s of hyperglycemia and feels well. Reports she was concerned by SMBG - reviewed with patient. 2 significant excursions but one was the day after Thanksgiving. No readings in the past 3 weeks due to patient being out of supplies and not yet due for refill.     The history is provided by the patient.   Follow-up reason(s): addressing patient questions/concerns.     Diabetes  Patient needs assistance troubleshooting: DME  Patient is not experiencing signs/symptoms of hyperglycemia.            Last 6 Patient Entered Readings                                          Most Recent A1c: 6.2% on 6/4/2020  (Goal: 8%)     Recent Readings 12/1/2020 11/30/2020 11/29/2020 11/27/2020 11/26/2020    Blood Glucose (mg/dL) 159 172 155 220 198               Depression Screening  Did not address depression screening.    Sleep Apnea Screening    Did not address sleep apnea screening.     Medication Affordability Screening  Did not address medication affordability screening.     Medication Adherence-Medication adherence was assessed.          ASSESSMENT(S)  Patient's A1C goal is less than or equal to 8. Patient's most recent A1C result is at goal. Lab Results    Component                Value               Date                     HGBA1C                   6.2 (H)             06/04/2020          .       A1C is controlled with mild elevations typically for SMBG. Would like to be able to monitor most recent readings and consider titration of Trulicity. Would want ability to monitor after dose change as well. Patient agreeable.       Diabetes Plan  Continue current therapy.  Continue current diet/physical activity routine. Encouraged dietary and exercise compliance.   - Patient plans to order test strips from [employer] to [facility] until next refill is due.   - agrees to 1-2  weeks of readings prior to medication change. Patient to message when she starts to monitor.     Will Continue to monitor and reach out as needed.      Addressed patient questions and patient has my contact information if needed prior to next outreach. Patient verbalizes understanding.                 Topic    Eye Exam     Urine Protein Check     Hemoglobin A1C      There are no preventive care reminders to display for this patient.        Diabetes Medications             dulaglutide (TRULICITY) 1.5 mg/0.5 mL pen injector Inject 1.5 mg into the skin once a week. Dose Increase.

## 2020-12-23 ENCOUNTER — PATIENT MESSAGE (OUTPATIENT)
Dept: PAIN MEDICINE | Facility: CLINIC | Age: 55
End: 2020-12-23

## 2021-01-02 ENCOUNTER — PATIENT MESSAGE (OUTPATIENT)
Dept: OTHER | Facility: OTHER | Age: 56
End: 2021-01-02

## 2021-01-07 ENCOUNTER — PATIENT OUTREACH (OUTPATIENT)
Dept: ADMINISTRATIVE | Facility: HOSPITAL | Age: 56
End: 2021-01-07

## 2021-01-12 ENCOUNTER — PATIENT OUTREACH (OUTPATIENT)
Dept: ADMINISTRATIVE | Facility: OTHER | Age: 56
End: 2021-01-12

## 2021-01-12 DIAGNOSIS — Z12.31 ENCOUNTER FOR SCREENING MAMMOGRAM FOR MALIGNANT NEOPLASM OF BREAST: Primary | ICD-10-CM

## 2021-01-13 ENCOUNTER — OFFICE VISIT (OUTPATIENT)
Dept: PODIATRY | Facility: CLINIC | Age: 56
End: 2021-01-13
Payer: COMMERCIAL

## 2021-01-13 ENCOUNTER — PATIENT MESSAGE (OUTPATIENT)
Dept: INTERNAL MEDICINE | Facility: CLINIC | Age: 56
End: 2021-01-13

## 2021-01-13 VITALS
SYSTOLIC BLOOD PRESSURE: 177 MMHG | WEIGHT: 238 LBS | DIASTOLIC BLOOD PRESSURE: 96 MMHG | HEART RATE: 78 BPM | HEIGHT: 65 IN | BODY MASS INDEX: 39.65 KG/M2

## 2021-01-13 DIAGNOSIS — M19.079 OSTEOARTHRITIS OF ANKLE AND FOOT, UNSPECIFIED LATERALITY: ICD-10-CM

## 2021-01-13 DIAGNOSIS — G57.91 NEURITIS OF ANKLE, RIGHT: Primary | ICD-10-CM

## 2021-01-13 DIAGNOSIS — M21.41 PES PLANUS OF BOTH FEET: ICD-10-CM

## 2021-01-13 DIAGNOSIS — M21.42 PES PLANUS OF BOTH FEET: ICD-10-CM

## 2021-01-13 PROCEDURE — 3080F DIAST BP >= 90 MM HG: CPT | Mod: CPTII,S$GLB,, | Performed by: PODIATRIST

## 2021-01-13 PROCEDURE — 99214 OFFICE O/P EST MOD 30 MIN: CPT | Mod: S$GLB,,, | Performed by: PODIATRIST

## 2021-01-13 PROCEDURE — 99999 PR PBB SHADOW E&M-EST. PATIENT-LVL IV: ICD-10-PCS | Mod: PBBFAC,,, | Performed by: PODIATRIST

## 2021-01-13 PROCEDURE — 3080F PR MOST RECENT DIASTOLIC BLOOD PRESSURE >= 90 MM HG: ICD-10-PCS | Mod: CPTII,S$GLB,, | Performed by: PODIATRIST

## 2021-01-13 PROCEDURE — 3008F PR BODY MASS INDEX (BMI) DOCUMENTED: ICD-10-PCS | Mod: CPTII,S$GLB,, | Performed by: PODIATRIST

## 2021-01-13 PROCEDURE — 3077F PR MOST RECENT SYSTOLIC BLOOD PRESSURE >= 140 MM HG: ICD-10-PCS | Mod: CPTII,S$GLB,, | Performed by: PODIATRIST

## 2021-01-13 PROCEDURE — 1125F AMNT PAIN NOTED PAIN PRSNT: CPT | Mod: S$GLB,,, | Performed by: PODIATRIST

## 2021-01-13 PROCEDURE — 3077F SYST BP >= 140 MM HG: CPT | Mod: CPTII,S$GLB,, | Performed by: PODIATRIST

## 2021-01-13 PROCEDURE — 99999 PR PBB SHADOW E&M-EST. PATIENT-LVL IV: CPT | Mod: PBBFAC,,, | Performed by: PODIATRIST

## 2021-01-13 PROCEDURE — 3008F BODY MASS INDEX DOCD: CPT | Mod: CPTII,S$GLB,, | Performed by: PODIATRIST

## 2021-01-13 PROCEDURE — 99214 PR OFFICE/OUTPT VISIT, EST, LEVL IV, 30-39 MIN: ICD-10-PCS | Mod: S$GLB,,, | Performed by: PODIATRIST

## 2021-01-13 PROCEDURE — 1125F PR PAIN SEVERITY QUANTIFIED, PAIN PRESENT: ICD-10-PCS | Mod: S$GLB,,, | Performed by: PODIATRIST

## 2021-01-19 ENCOUNTER — OFFICE VISIT (OUTPATIENT)
Dept: INTERNAL MEDICINE | Facility: CLINIC | Age: 56
End: 2021-01-19
Payer: COMMERCIAL

## 2021-01-19 VITALS
HEART RATE: 71 BPM | DIASTOLIC BLOOD PRESSURE: 82 MMHG | WEIGHT: 238.13 LBS | TEMPERATURE: 99 F | HEIGHT: 65 IN | OXYGEN SATURATION: 99 % | RESPIRATION RATE: 18 BRPM | BODY MASS INDEX: 39.67 KG/M2 | SYSTOLIC BLOOD PRESSURE: 138 MMHG

## 2021-01-19 DIAGNOSIS — E11.9 TYPE 2 DIABETES MELLITUS WITHOUT COMPLICATION, WITHOUT LONG-TERM CURRENT USE OF INSULIN: ICD-10-CM

## 2021-01-19 DIAGNOSIS — I10 HYPERTENSION GOAL BP (BLOOD PRESSURE) < 130/80: Chronic | ICD-10-CM

## 2021-01-19 DIAGNOSIS — E78.5 HYPERLIPIDEMIA LDL GOAL <70: Primary | Chronic | ICD-10-CM

## 2021-01-19 DIAGNOSIS — Z12.31 ENCOUNTER FOR SCREENING MAMMOGRAM FOR MALIGNANT NEOPLASM OF BREAST: ICD-10-CM

## 2021-01-19 PROCEDURE — 3008F BODY MASS INDEX DOCD: CPT | Mod: CPTII,S$GLB,, | Performed by: PHYSICIAN ASSISTANT

## 2021-01-19 PROCEDURE — 3008F PR BODY MASS INDEX (BMI) DOCUMENTED: ICD-10-PCS | Mod: CPTII,S$GLB,, | Performed by: PHYSICIAN ASSISTANT

## 2021-01-19 PROCEDURE — 99999 PR PBB SHADOW E&M-EST. PATIENT-LVL IV: ICD-10-PCS | Mod: PBBFAC,,, | Performed by: PHYSICIAN ASSISTANT

## 2021-01-19 PROCEDURE — 3079F DIAST BP 80-89 MM HG: CPT | Mod: CPTII,S$GLB,, | Performed by: PHYSICIAN ASSISTANT

## 2021-01-19 PROCEDURE — 1126F PR PAIN SEVERITY QUANTIFIED, NO PAIN PRESENT: ICD-10-PCS | Mod: S$GLB,,, | Performed by: PHYSICIAN ASSISTANT

## 2021-01-19 PROCEDURE — 3044F HG A1C LEVEL LT 7.0%: CPT | Mod: CPTII,S$GLB,, | Performed by: PHYSICIAN ASSISTANT

## 2021-01-19 PROCEDURE — 99214 OFFICE O/P EST MOD 30 MIN: CPT | Mod: S$GLB,,, | Performed by: PHYSICIAN ASSISTANT

## 2021-01-19 PROCEDURE — 99999 PR PBB SHADOW E&M-EST. PATIENT-LVL IV: CPT | Mod: PBBFAC,,, | Performed by: PHYSICIAN ASSISTANT

## 2021-01-19 PROCEDURE — 3044F PR MOST RECENT HEMOGLOBIN A1C LEVEL <7.0%: ICD-10-PCS | Mod: CPTII,S$GLB,, | Performed by: PHYSICIAN ASSISTANT

## 2021-01-19 PROCEDURE — 3075F PR MOST RECENT SYSTOLIC BLOOD PRESS GE 130-139MM HG: ICD-10-PCS | Mod: CPTII,S$GLB,, | Performed by: PHYSICIAN ASSISTANT

## 2021-01-19 PROCEDURE — 3079F PR MOST RECENT DIASTOLIC BLOOD PRESSURE 80-89 MM HG: ICD-10-PCS | Mod: CPTII,S$GLB,, | Performed by: PHYSICIAN ASSISTANT

## 2021-01-19 PROCEDURE — 3075F SYST BP GE 130 - 139MM HG: CPT | Mod: CPTII,S$GLB,, | Performed by: PHYSICIAN ASSISTANT

## 2021-01-19 PROCEDURE — 1126F AMNT PAIN NOTED NONE PRSNT: CPT | Mod: S$GLB,,, | Performed by: PHYSICIAN ASSISTANT

## 2021-01-19 PROCEDURE — 99214 PR OFFICE/OUTPT VISIT, EST, LEVL IV, 30-39 MIN: ICD-10-PCS | Mod: S$GLB,,, | Performed by: PHYSICIAN ASSISTANT

## 2021-01-28 ENCOUNTER — PATIENT OUTREACH (OUTPATIENT)
Dept: ADMINISTRATIVE | Facility: HOSPITAL | Age: 56
End: 2021-01-28

## 2021-02-03 ENCOUNTER — HOSPITAL ENCOUNTER (OUTPATIENT)
Dept: RADIOLOGY | Facility: HOSPITAL | Age: 56
Discharge: HOME | End: 2021-02-03
Attending: PHYSICIAN ASSISTANT
Payer: COMMERCIAL

## 2021-02-03 DIAGNOSIS — Z12.31 ENCOUNTER FOR SCREENING MAMMOGRAM FOR MALIGNANT NEOPLASM OF BREAST: ICD-10-CM

## 2021-02-03 PROCEDURE — 77067 SCR MAMMO BI INCL CAD: CPT | Mod: TC

## 2021-02-03 PROCEDURE — 77063 BREAST TOMOSYNTHESIS BI: CPT | Mod: 26,,, | Performed by: RADIOLOGY

## 2021-02-03 PROCEDURE — 77067 SCR MAMMO BI INCL CAD: CPT | Mod: 26,,, | Performed by: RADIOLOGY

## 2021-02-03 PROCEDURE — 77063 MAMMO DIGITAL SCREENING BILAT WITH TOMO: ICD-10-PCS | Mod: 26,,, | Performed by: RADIOLOGY

## 2021-02-03 PROCEDURE — 77067 MAMMO DIGITAL SCREENING BILAT WITH TOMO: ICD-10-PCS | Mod: 26,,, | Performed by: RADIOLOGY

## 2021-02-04 ENCOUNTER — TELEPHONE (OUTPATIENT)
Dept: INTERNAL MEDICINE | Facility: CLINIC | Age: 56
End: 2021-02-04

## 2021-02-04 DIAGNOSIS — E87.6 HYPOKALEMIA: Primary | ICD-10-CM

## 2021-02-20 ENCOUNTER — PATIENT MESSAGE (OUTPATIENT)
Dept: INTERNAL MEDICINE | Facility: CLINIC | Age: 56
End: 2021-02-20

## 2021-03-20 DIAGNOSIS — I10 HYPERTENSION GOAL BP (BLOOD PRESSURE) < 130/80: Chronic | ICD-10-CM

## 2021-03-23 DIAGNOSIS — I10 HYPERTENSION GOAL BP (BLOOD PRESSURE) < 130/80: Chronic | ICD-10-CM

## 2021-03-24 DIAGNOSIS — I10 HYPERTENSION GOAL BP (BLOOD PRESSURE) < 130/80: Chronic | ICD-10-CM

## 2021-03-25 RX ORDER — CHLORTHALIDONE 25 MG/1
TABLET ORAL
Qty: 90 TABLET | Refills: 1 | Status: SHIPPED | OUTPATIENT
Start: 2021-03-25 | End: 2021-09-15

## 2021-03-25 RX ORDER — CHLORTHALIDONE 25 MG/1
25 TABLET ORAL DAILY
Qty: 90 TABLET | Refills: 1 | OUTPATIENT
Start: 2021-03-25

## 2021-04-01 ENCOUNTER — OFFICE VISIT (OUTPATIENT)
Dept: INTERNAL MEDICINE | Facility: CLINIC | Age: 56
End: 2021-04-01
Payer: COMMERCIAL

## 2021-04-01 DIAGNOSIS — F32.A ANXIETY AND DEPRESSION: Primary | ICD-10-CM

## 2021-04-01 DIAGNOSIS — E11.9 TYPE 2 DIABETES MELLITUS WITHOUT COMPLICATION, WITHOUT LONG-TERM CURRENT USE OF INSULIN: ICD-10-CM

## 2021-04-01 DIAGNOSIS — I10 HYPERTENSION GOAL BP (BLOOD PRESSURE) < 130/80: Chronic | ICD-10-CM

## 2021-04-01 DIAGNOSIS — F41.9 ANXIETY AND DEPRESSION: Primary | ICD-10-CM

## 2021-04-01 PROCEDURE — 3044F HG A1C LEVEL LT 7.0%: CPT | Mod: CPTII,,, | Performed by: PHYSICIAN ASSISTANT

## 2021-04-01 PROCEDURE — 3044F PR MOST RECENT HEMOGLOBIN A1C LEVEL <7.0%: ICD-10-PCS | Mod: CPTII,,, | Performed by: PHYSICIAN ASSISTANT

## 2021-04-01 PROCEDURE — 99214 OFFICE O/P EST MOD 30 MIN: CPT | Mod: 95,,, | Performed by: PHYSICIAN ASSISTANT

## 2021-04-01 PROCEDURE — 99214 PR OFFICE/OUTPT VISIT, EST, LEVL IV, 30-39 MIN: ICD-10-PCS | Mod: 95,,, | Performed by: PHYSICIAN ASSISTANT

## 2021-04-01 RX ORDER — DULOXETIN HYDROCHLORIDE 60 MG/1
60 CAPSULE, DELAYED RELEASE ORAL DAILY
Qty: 30 CAPSULE | Refills: 1 | Status: SHIPPED | OUTPATIENT
Start: 2021-04-01 | End: 2021-05-20 | Stop reason: SDUPTHER

## 2021-04-04 ENCOUNTER — PATIENT MESSAGE (OUTPATIENT)
Dept: INTERNAL MEDICINE | Facility: CLINIC | Age: 56
End: 2021-04-04

## 2021-04-13 NOTE — PROGRESS NOTES
Subjective:       Patient ID: Leydi Quintero is a 52 y.o. female.    Chief Complaint: Diarrhea (nausea); Leg Pain (bilateral); and Nocturia    HPI Ms. Ibarra presents today for multiple complaints.   She has been having diarrhea and nausea for 4 days as well. Diarrhea isn't bad but any time she goes yesterday 3 times it was very soft and not normal.     Frequent urination, pressure for about 4 days. She has been thirsty a lot.   Pain in both legs   Pain goes from the ankle up to the thigh/hip this is mainly when she turns on that side if she turns on the other side it is the same.   She is exercising and she does get a little sore from that but doesn't feel that this is from that. She can't take aleve/naproxen it bothers her stomach.     Blood pressure elevated. Last appointment this was noted 12/2017 PCP recommended holding Qysmia, changing diet and continuing lasix.   She is exercising now. She stopped the qysmia for weight loss.    Review of Systems   Constitutional: Positive for fatigue. Negative for activity change, appetite change and fever.   HENT: Negative for congestion, ear pain and sinus pressure.    Eyes: Negative for pain and visual disturbance.   Respiratory: Negative for cough, chest tightness and wheezing.    Cardiovascular: Negative for chest pain, palpitations and leg swelling.   Gastrointestinal: Positive for diarrhea and nausea. Negative for abdominal distention, abdominal pain, constipation and vomiting.   Endocrine: Negative for polydipsia and polyuria.   Genitourinary: Negative for difficulty urinating, dyspareunia, dysuria, flank pain and hematuria.   Musculoskeletal: Positive for arthralgias and myalgias. Negative for back pain.   Skin: Negative for rash.   Neurological: Negative for dizziness, tremors, syncope, weakness, numbness and headaches.   Psychiatric/Behavioral: Negative for agitation and confusion.         Past Medical History:   Diagnosis Date    Allergic rhinitis      Anxiety     Asthma     Hyperlipidemia     Hypertension     Sarcoidosis      Family History   Problem Relation Age of Onset    Diabetes Mother     Hypertension Mother     Hypertension Father     Hyperlipidemia Father     Breast cancer Neg Hx     Colon cancer Neg Hx     Ovarian cancer Neg Hx      Objective:        Physical Exam   Constitutional: She is oriented to person, place, and time. She appears well-developed and well-nourished.   HENT:   Head: Normocephalic and atraumatic.   Cardiovascular: Normal rate, regular rhythm and normal heart sounds.    Pulmonary/Chest: Effort normal and breath sounds normal.   Abdominal: Soft. Bowel sounds are normal. She exhibits no distension. There is no tenderness.   Musculoskeletal: Normal range of motion.   Neurological: She is alert and oriented to person, place, and time.   Skin: Skin is warm.   Psychiatric: She has a normal mood and affect. Her behavior is normal.   Vitals reviewed.        Assessment/Plan:     Dysuria  -     Urinalysis  -     Urine culture  -     Urinalysis Microscopic  -     phenazopyridine (PYRIDIUM) 100 MG tablet; Take 1 tablet (100 mg total) by mouth 3 (three) times daily as needed for Pain.  Dispense: 15 tablet; Refill: 0    Hyperlipidemia LDL goal <130  -     Lipid panel; Future; Expected date: 04/04/2018  -     Comprehensive metabolic panel; Future; Expected date: 04/04/2018    Polydipsia  -     Hemoglobin A1c; Future; Expected date: 04/04/2018    Urinary frequency  -     Hemoglobin A1c; Future; Expected date: 04/04/2018    Hypertension goal BP (blood pressure) < 140/90  -     Comprehensive metabolic panel; Future; Expected date: 04/04/2018  -     chlorthalidone (HYGROTEN) 25 MG Tab; Take 1 tablet (25 mg total) by mouth once daily.  Dispense: 90 tablet; Refill: 1    General ill feeling  -     TSH; Future; Expected date: 04/04/2018  -     X-Ray Abdomen Flat And Erect; Future; Expected date: 04/04/2018    Other fatigue  -     TSH; Future;  Expected date: 04/04/2018    Nausea  -     X-Ray Abdomen Flat And Erect; Future; Expected date: 04/04/2018    Diarrhea, unspecified type  -     X-Ray Abdomen Flat And Erect; Future; Expected date: 04/04/2018      Will try tylenol if not helping will send anti-inflammatory not naproxen/aleve  Changed from lasix to Hygroten  Blood pressure check in 2 weeks if still elevated add Ace inhibitor.   She already gets swelling from time to time in lower extremities we don't want to add amlodipine.     Follow-up in about 4 weeks (around 5/2/2018).    Georgia Cortez MD  [facility]   Family Medicine     crackles

## 2021-04-20 DIAGNOSIS — R09.81 SINUS CONGESTION: ICD-10-CM

## 2021-04-23 RX ORDER — MONTELUKAST SODIUM 10 MG/1
TABLET ORAL
Qty: 90 TABLET | Refills: 0 | Status: SHIPPED | OUTPATIENT
Start: 2021-04-23 | End: 2021-07-08

## 2021-05-03 ENCOUNTER — PATIENT MESSAGE (OUTPATIENT)
Dept: ADMINISTRATIVE | Facility: OTHER | Age: 56
End: 2021-05-03

## 2021-05-20 ENCOUNTER — OFFICE VISIT (OUTPATIENT)
Dept: INTERNAL MEDICINE | Facility: CLINIC | Age: 56
End: 2021-05-20
Payer: COMMERCIAL

## 2021-05-20 DIAGNOSIS — F41.9 ANXIETY AND DEPRESSION: Primary | ICD-10-CM

## 2021-05-20 DIAGNOSIS — F32.A ANXIETY AND DEPRESSION: Primary | ICD-10-CM

## 2021-05-20 DIAGNOSIS — E87.6 HYPOKALEMIA: ICD-10-CM

## 2021-05-20 PROCEDURE — 99214 OFFICE O/P EST MOD 30 MIN: CPT | Mod: 95,,, | Performed by: PHYSICIAN ASSISTANT

## 2021-05-20 PROCEDURE — 99214 PR OFFICE/OUTPT VISIT, EST, LEVL IV, 30-39 MIN: ICD-10-PCS | Mod: 95,,, | Performed by: PHYSICIAN ASSISTANT

## 2021-05-20 RX ORDER — DULOXETIN HYDROCHLORIDE 60 MG/1
60 CAPSULE, DELAYED RELEASE ORAL DAILY
Qty: 90 CAPSULE | Refills: 1 | Status: SHIPPED | OUTPATIENT
Start: 2021-05-20 | End: 2021-10-11 | Stop reason: SDUPTHER

## 2021-07-06 DIAGNOSIS — E78.5 HYPERLIPIDEMIA LDL GOAL <70: Chronic | ICD-10-CM

## 2021-07-06 DIAGNOSIS — R09.81 SINUS CONGESTION: ICD-10-CM

## 2021-07-08 RX ORDER — MONTELUKAST SODIUM 10 MG/1
TABLET ORAL
Qty: 90 TABLET | Refills: 1 | Status: SHIPPED | OUTPATIENT
Start: 2021-07-08 | End: 2022-01-03

## 2021-07-08 RX ORDER — PRAVASTATIN SODIUM 80 MG/1
TABLET ORAL
Qty: 90 TABLET | Refills: 1 | Status: SHIPPED | OUTPATIENT
Start: 2021-07-08 | End: 2022-01-03

## 2021-07-08 RX ORDER — METFORMIN HYDROCHLORIDE 500 MG/1
TABLET, EXTENDED RELEASE ORAL
Qty: 180 TABLET | OUTPATIENT
Start: 2021-07-08

## 2021-08-12 ENCOUNTER — PATIENT MESSAGE (OUTPATIENT)
Dept: INTERNAL MEDICINE | Facility: CLINIC | Age: 56
End: 2021-08-12

## 2021-08-16 ENCOUNTER — TELEPHONE (OUTPATIENT)
Dept: PRIMARY CARE CLINIC | Facility: CLINIC | Age: 56
End: 2021-08-16

## 2021-08-16 ENCOUNTER — LAB VISIT (OUTPATIENT)
Dept: LAB | Facility: HOSPITAL | Age: 56
End: 2021-08-16
Attending: INTERNAL MEDICINE
Payer: COMMERCIAL

## 2021-08-16 DIAGNOSIS — E87.6 DRUG-INDUCED HYPOKALEMIA: Primary | ICD-10-CM

## 2021-08-16 DIAGNOSIS — T50.2X5A DIURETIC-INDUCED HYPOKALEMIA: ICD-10-CM

## 2021-08-16 DIAGNOSIS — T50.905A DRUG-INDUCED HYPOKALEMIA: Primary | ICD-10-CM

## 2021-08-16 DIAGNOSIS — E87.6 DIURETIC-INDUCED HYPOKALEMIA: ICD-10-CM

## 2021-08-16 DIAGNOSIS — E87.6 HYPOKALEMIA: ICD-10-CM

## 2021-08-16 LAB
ANION GAP SERPL CALC-SCNC: 15 MMOL/L (ref 8–16)
BUN SERPL-MCNC: 12 MG/DL (ref 6–20)
CALCIUM SERPL-MCNC: 10.3 MG/DL (ref 8.7–10.5)
CHLORIDE SERPL-SCNC: 101 MMOL/L (ref 95–110)
CO2 SERPL-SCNC: 24 MMOL/L (ref 23–29)
CREAT SERPL-MCNC: 0.9 MG/DL (ref 0.5–1.4)
EST. GFR  (AFRICAN AMERICAN): >60 ML/MIN/1.73 M^2
EST. GFR  (NON AFRICAN AMERICAN): >60 ML/MIN/1.73 M^2
GLUCOSE SERPL-MCNC: 127 MG/DL (ref 70–110)
POTASSIUM SERPL-SCNC: 2.6 MMOL/L (ref 3.5–5.1)
SODIUM SERPL-SCNC: 140 MMOL/L (ref 136–145)

## 2021-08-16 PROCEDURE — 80048 BASIC METABOLIC PNL TOTAL CA: CPT | Performed by: PHYSICIAN ASSISTANT

## 2021-08-16 PROCEDURE — 83036 HEMOGLOBIN GLYCOSYLATED A1C: CPT | Performed by: INTERNAL MEDICINE

## 2021-08-16 PROCEDURE — 36415 COLL VENOUS BLD VENIPUNCTURE: CPT | Performed by: PHYSICIAN ASSISTANT

## 2021-08-16 RX ORDER — POTASSIUM CHLORIDE 750 MG/1
10 TABLET, EXTENDED RELEASE ORAL 2 TIMES DAILY
Qty: 90 TABLET | Refills: 0
Start: 2021-08-16 | End: 2021-09-15

## 2021-08-17 LAB
ESTIMATED AVG GLUCOSE: 120 MG/DL (ref 68–131)
HBA1C MFR BLD: 5.8 % (ref 4–5.6)

## 2021-08-19 ENCOUNTER — LAB VISIT (OUTPATIENT)
Dept: LAB | Facility: HOSPITAL | Age: 56
End: 2021-08-19
Attending: FAMILY MEDICINE
Payer: COMMERCIAL

## 2021-08-19 DIAGNOSIS — T50.905A DRUG-INDUCED HYPOKALEMIA: ICD-10-CM

## 2021-08-19 DIAGNOSIS — E87.6 DRUG-INDUCED HYPOKALEMIA: ICD-10-CM

## 2021-08-19 LAB
ANION GAP SERPL CALC-SCNC: 14 MMOL/L (ref 8–16)
BUN SERPL-MCNC: 11 MG/DL (ref 6–20)
CALCIUM SERPL-MCNC: 9.8 MG/DL (ref 8.7–10.5)
CHLORIDE SERPL-SCNC: 104 MMOL/L (ref 95–110)
CO2 SERPL-SCNC: 20 MMOL/L (ref 23–29)
CREAT SERPL-MCNC: 0.9 MG/DL (ref 0.5–1.4)
EST. GFR  (AFRICAN AMERICAN): >60 ML/MIN/1.73 M^2
EST. GFR  (NON AFRICAN AMERICAN): >60 ML/MIN/1.73 M^2
GLUCOSE SERPL-MCNC: 96 MG/DL (ref 70–110)
POTASSIUM SERPL-SCNC: 2.9 MMOL/L (ref 3.5–5.1)
SODIUM SERPL-SCNC: 138 MMOL/L (ref 136–145)

## 2021-08-19 PROCEDURE — 36415 COLL VENOUS BLD VENIPUNCTURE: CPT | Performed by: FAMILY MEDICINE

## 2021-08-19 PROCEDURE — 80048 BASIC METABOLIC PNL TOTAL CA: CPT | Performed by: FAMILY MEDICINE

## 2021-08-20 DIAGNOSIS — E87.6 HYPOKALEMIA: Primary | ICD-10-CM

## 2021-09-13 DIAGNOSIS — I10 HYPERTENSION GOAL BP (BLOOD PRESSURE) < 130/80: Chronic | ICD-10-CM

## 2021-09-16 ENCOUNTER — PATIENT MESSAGE (OUTPATIENT)
Dept: INTERNAL MEDICINE | Facility: CLINIC | Age: 56
End: 2021-09-16

## 2021-09-16 ENCOUNTER — OFFICE VISIT (OUTPATIENT)
Dept: INTERNAL MEDICINE | Facility: CLINIC | Age: 56
End: 2021-09-16
Payer: COMMERCIAL

## 2021-09-16 ENCOUNTER — LAB VISIT (OUTPATIENT)
Dept: LAB | Facility: HOSPITAL | Age: 56
End: 2021-09-16
Attending: INTERNAL MEDICINE
Payer: COMMERCIAL

## 2021-09-16 VITALS
WEIGHT: 227.94 LBS | HEIGHT: 65 IN | SYSTOLIC BLOOD PRESSURE: 110 MMHG | HEART RATE: 86 BPM | OXYGEN SATURATION: 98 % | TEMPERATURE: 99 F | DIASTOLIC BLOOD PRESSURE: 62 MMHG | BODY MASS INDEX: 37.98 KG/M2

## 2021-09-16 DIAGNOSIS — T50.2X5A DIURETIC-INDUCED HYPOKALEMIA: ICD-10-CM

## 2021-09-16 DIAGNOSIS — E87.6 DIURETIC-INDUCED HYPOKALEMIA: ICD-10-CM

## 2021-09-16 DIAGNOSIS — E78.5 HYPERLIPIDEMIA LDL GOAL <70: ICD-10-CM

## 2021-09-16 DIAGNOSIS — R20.2 PARESTHESIA OF RIGHT LEG: ICD-10-CM

## 2021-09-16 DIAGNOSIS — Z12.11 COLON CANCER SCREENING: ICD-10-CM

## 2021-09-16 DIAGNOSIS — E11.9 CONTROLLED TYPE 2 DIABETES MELLITUS WITHOUT COMPLICATION, WITHOUT LONG-TERM CURRENT USE OF INSULIN: ICD-10-CM

## 2021-09-16 DIAGNOSIS — I10 HYPERTENSION GOAL BP (BLOOD PRESSURE) < 130/80: Primary | Chronic | ICD-10-CM

## 2021-09-16 DIAGNOSIS — F41.9 ANXIETY: ICD-10-CM

## 2021-09-16 LAB
ANION GAP SERPL CALC-SCNC: 12 MMOL/L (ref 8–16)
BUN SERPL-MCNC: 10 MG/DL (ref 6–20)
CALCIUM SERPL-MCNC: 9.8 MG/DL (ref 8.7–10.5)
CHLORIDE SERPL-SCNC: 101 MMOL/L (ref 95–110)
CO2 SERPL-SCNC: 28 MMOL/L (ref 23–29)
CREAT SERPL-MCNC: 0.9 MG/DL (ref 0.5–1.4)
EST. GFR  (AFRICAN AMERICAN): >60 ML/MIN/1.73 M^2
EST. GFR  (NON AFRICAN AMERICAN): >60 ML/MIN/1.73 M^2
GLUCOSE SERPL-MCNC: 102 MG/DL (ref 70–110)
POTASSIUM SERPL-SCNC: 2.9 MMOL/L (ref 3.5–5.1)
SODIUM SERPL-SCNC: 141 MMOL/L (ref 136–145)
VIT B12 SERPL-MCNC: 796 PG/ML (ref 210–950)

## 2021-09-16 PROCEDURE — 99999 PR PBB SHADOW E&M-EST. PATIENT-LVL IV: CPT | Mod: PBBFAC,,, | Performed by: INTERNAL MEDICINE

## 2021-09-16 PROCEDURE — 3061F PR NEG MICROALBUMINURIA RESULT DOCUMENTED/REVIEW: ICD-10-PCS | Mod: CPTII,S$GLB,, | Performed by: INTERNAL MEDICINE

## 2021-09-16 PROCEDURE — 99999 PR PBB SHADOW E&M-EST. PATIENT-LVL IV: ICD-10-PCS | Mod: PBBFAC,,, | Performed by: INTERNAL MEDICINE

## 2021-09-16 PROCEDURE — 3066F PR DOCUMENTATION OF TREATMENT FOR NEPHROPATHY: ICD-10-PCS | Mod: CPTII,S$GLB,, | Performed by: INTERNAL MEDICINE

## 2021-09-16 PROCEDURE — 80048 BASIC METABOLIC PNL TOTAL CA: CPT | Performed by: INTERNAL MEDICINE

## 2021-09-16 PROCEDURE — 1160F PR REVIEW ALL MEDS BY PRESCRIBER/CLIN PHARMACIST DOCUMENTED: ICD-10-PCS | Mod: CPTII,S$GLB,, | Performed by: INTERNAL MEDICINE

## 2021-09-16 PROCEDURE — 99214 PR OFFICE/OUTPT VISIT, EST, LEVL IV, 30-39 MIN: ICD-10-PCS | Mod: S$GLB,,, | Performed by: INTERNAL MEDICINE

## 2021-09-16 PROCEDURE — 3074F PR MOST RECENT SYSTOLIC BLOOD PRESSURE < 130 MM HG: ICD-10-PCS | Mod: CPTII,S$GLB,, | Performed by: INTERNAL MEDICINE

## 2021-09-16 PROCEDURE — 3008F BODY MASS INDEX DOCD: CPT | Mod: CPTII,S$GLB,, | Performed by: INTERNAL MEDICINE

## 2021-09-16 PROCEDURE — 82607 VITAMIN B-12: CPT | Performed by: INTERNAL MEDICINE

## 2021-09-16 PROCEDURE — 36415 COLL VENOUS BLD VENIPUNCTURE: CPT | Performed by: INTERNAL MEDICINE

## 2021-09-16 PROCEDURE — 1159F PR MEDICATION LIST DOCUMENTED IN MEDICAL RECORD: ICD-10-PCS | Mod: CPTII,S$GLB,, | Performed by: INTERNAL MEDICINE

## 2021-09-16 PROCEDURE — 3074F SYST BP LT 130 MM HG: CPT | Mod: CPTII,S$GLB,, | Performed by: INTERNAL MEDICINE

## 2021-09-16 PROCEDURE — 1160F RVW MEDS BY RX/DR IN RCRD: CPT | Mod: CPTII,S$GLB,, | Performed by: INTERNAL MEDICINE

## 2021-09-16 PROCEDURE — 99214 OFFICE O/P EST MOD 30 MIN: CPT | Mod: S$GLB,,, | Performed by: INTERNAL MEDICINE

## 2021-09-16 PROCEDURE — 3066F NEPHROPATHY DOC TX: CPT | Mod: CPTII,S$GLB,, | Performed by: INTERNAL MEDICINE

## 2021-09-16 PROCEDURE — 3008F PR BODY MASS INDEX (BMI) DOCUMENTED: ICD-10-PCS | Mod: CPTII,S$GLB,, | Performed by: INTERNAL MEDICINE

## 2021-09-16 PROCEDURE — 3078F DIAST BP <80 MM HG: CPT | Mod: CPTII,S$GLB,, | Performed by: INTERNAL MEDICINE

## 2021-09-16 PROCEDURE — 3078F PR MOST RECENT DIASTOLIC BLOOD PRESSURE < 80 MM HG: ICD-10-PCS | Mod: CPTII,S$GLB,, | Performed by: INTERNAL MEDICINE

## 2021-09-16 PROCEDURE — 3044F PR MOST RECENT HEMOGLOBIN A1C LEVEL <7.0%: ICD-10-PCS | Mod: CPTII,S$GLB,, | Performed by: INTERNAL MEDICINE

## 2021-09-16 PROCEDURE — 3044F HG A1C LEVEL LT 7.0%: CPT | Mod: CPTII,S$GLB,, | Performed by: INTERNAL MEDICINE

## 2021-09-16 PROCEDURE — 1159F MED LIST DOCD IN RCRD: CPT | Mod: CPTII,S$GLB,, | Performed by: INTERNAL MEDICINE

## 2021-09-16 PROCEDURE — 3061F NEG MICROALBUMINURIA REV: CPT | Mod: CPTII,S$GLB,, | Performed by: INTERNAL MEDICINE

## 2021-09-16 RX ORDER — CHLORTHALIDONE 25 MG/1
TABLET ORAL
Qty: 90 TABLET | Refills: 1 | Status: SHIPPED | OUTPATIENT
Start: 2021-09-16 | End: 2021-09-17 | Stop reason: ALTCHOICE

## 2021-09-17 ENCOUNTER — TELEPHONE (OUTPATIENT)
Dept: INTERNAL MEDICINE | Facility: CLINIC | Age: 56
End: 2021-09-17

## 2021-09-17 DIAGNOSIS — E87.6 DIURETIC-INDUCED HYPOKALEMIA: Primary | ICD-10-CM

## 2021-09-17 DIAGNOSIS — T50.2X5A DIURETIC-INDUCED HYPOKALEMIA: Primary | ICD-10-CM

## 2021-09-17 DIAGNOSIS — I10 HYPERTENSION GOAL BP (BLOOD PRESSURE) < 130/80: Chronic | ICD-10-CM

## 2021-09-17 RX ORDER — AMLODIPINE BESYLATE 5 MG/1
5 TABLET ORAL DAILY
Qty: 30 TABLET | Refills: 0 | Status: SHIPPED | OUTPATIENT
Start: 2021-09-17 | End: 2021-10-01 | Stop reason: SDUPTHER

## 2021-09-22 ENCOUNTER — PATIENT OUTREACH (OUTPATIENT)
Dept: ADMINISTRATIVE | Facility: OTHER | Age: 56
End: 2021-09-22

## 2021-09-23 ENCOUNTER — OFFICE VISIT (OUTPATIENT)
Dept: OPHTHALMOLOGY | Facility: CLINIC | Age: 56
End: 2021-09-23
Payer: COMMERCIAL

## 2021-09-23 DIAGNOSIS — E11.9 DIABETES MELLITUS TYPE 2 WITHOUT RETINOPATHY: Primary | ICD-10-CM

## 2021-09-23 DIAGNOSIS — H52.7 REFRACTIVE ERRORS: ICD-10-CM

## 2021-09-23 PROCEDURE — 2023F PR DILATED RETINAL EXAM W/O EVID OF RETINOPATHY: ICD-10-PCS | Mod: CPTII,S$GLB,, | Performed by: OPTOMETRIST

## 2021-09-23 PROCEDURE — 3044F PR MOST RECENT HEMOGLOBIN A1C LEVEL <7.0%: ICD-10-PCS | Mod: CPTII,S$GLB,, | Performed by: OPTOMETRIST

## 2021-09-23 PROCEDURE — 3061F NEG MICROALBUMINURIA REV: CPT | Mod: CPTII,S$GLB,, | Performed by: OPTOMETRIST

## 2021-09-23 PROCEDURE — 3044F HG A1C LEVEL LT 7.0%: CPT | Mod: CPTII,S$GLB,, | Performed by: OPTOMETRIST

## 2021-09-23 PROCEDURE — 92015 DETERMINE REFRACTIVE STATE: CPT | Mod: S$GLB,,, | Performed by: OPTOMETRIST

## 2021-09-23 PROCEDURE — 3066F NEPHROPATHY DOC TX: CPT | Mod: CPTII,S$GLB,, | Performed by: OPTOMETRIST

## 2021-09-23 PROCEDURE — 92015 PR REFRACTION: ICD-10-PCS | Mod: S$GLB,,, | Performed by: OPTOMETRIST

## 2021-09-23 PROCEDURE — 2023F DILAT RTA XM W/O RTNOPTHY: CPT | Mod: CPTII,S$GLB,, | Performed by: OPTOMETRIST

## 2021-09-23 PROCEDURE — 99999 PR PBB SHADOW E&M-EST. PATIENT-LVL II: ICD-10-PCS | Mod: PBBFAC,,, | Performed by: OPTOMETRIST

## 2021-09-23 PROCEDURE — 99999 PR PBB SHADOW E&M-EST. PATIENT-LVL II: CPT | Mod: PBBFAC,,, | Performed by: OPTOMETRIST

## 2021-09-23 PROCEDURE — 3061F PR NEG MICROALBUMINURIA RESULT DOCUMENTED/REVIEW: ICD-10-PCS | Mod: CPTII,S$GLB,, | Performed by: OPTOMETRIST

## 2021-09-23 PROCEDURE — 1159F PR MEDICATION LIST DOCUMENTED IN MEDICAL RECORD: ICD-10-PCS | Mod: CPTII,S$GLB,, | Performed by: OPTOMETRIST

## 2021-09-23 PROCEDURE — 3066F PR DOCUMENTATION OF TREATMENT FOR NEPHROPATHY: ICD-10-PCS | Mod: CPTII,S$GLB,, | Performed by: OPTOMETRIST

## 2021-09-23 PROCEDURE — 92004 PR EYE EXAM, NEW PATIENT,COMPREHESV: ICD-10-PCS | Mod: S$GLB,,, | Performed by: OPTOMETRIST

## 2021-09-23 PROCEDURE — 1159F MED LIST DOCD IN RCRD: CPT | Mod: CPTII,S$GLB,, | Performed by: OPTOMETRIST

## 2021-09-23 PROCEDURE — 92004 COMPRE OPH EXAM NEW PT 1/>: CPT | Mod: S$GLB,,, | Performed by: OPTOMETRIST

## 2021-10-01 ENCOUNTER — LAB VISIT (OUTPATIENT)
Dept: LAB | Facility: HOSPITAL | Age: 56
End: 2021-10-01
Attending: INTERNAL MEDICINE
Payer: COMMERCIAL

## 2021-10-01 ENCOUNTER — OFFICE VISIT (OUTPATIENT)
Dept: INTERNAL MEDICINE | Facility: CLINIC | Age: 56
End: 2021-10-01
Payer: COMMERCIAL

## 2021-10-01 VITALS
HEART RATE: 79 BPM | SYSTOLIC BLOOD PRESSURE: 126 MMHG | DIASTOLIC BLOOD PRESSURE: 76 MMHG | OXYGEN SATURATION: 95 % | HEIGHT: 65 IN | BODY MASS INDEX: 38.98 KG/M2 | RESPIRATION RATE: 16 BRPM | WEIGHT: 233.94 LBS | TEMPERATURE: 98 F

## 2021-10-01 DIAGNOSIS — E87.6 HYPOKALEMIA: ICD-10-CM

## 2021-10-01 DIAGNOSIS — E78.5 HYPERLIPIDEMIA LDL GOAL <70: ICD-10-CM

## 2021-10-01 DIAGNOSIS — I10 HYPERTENSION GOAL BP (BLOOD PRESSURE) < 130/80: Primary | Chronic | ICD-10-CM

## 2021-10-01 DIAGNOSIS — T50.2X5A DIURETIC-INDUCED HYPOKALEMIA: ICD-10-CM

## 2021-10-01 DIAGNOSIS — E87.6 DIURETIC-INDUCED HYPOKALEMIA: ICD-10-CM

## 2021-10-01 DIAGNOSIS — Z23 IMMUNIZATION DUE: ICD-10-CM

## 2021-10-01 DIAGNOSIS — E11.9 CONTROLLED TYPE 2 DIABETES MELLITUS WITHOUT COMPLICATION, WITHOUT LONG-TERM CURRENT USE OF INSULIN: ICD-10-CM

## 2021-10-01 LAB
ANION GAP SERPL CALC-SCNC: 11 MMOL/L (ref 8–16)
BUN SERPL-MCNC: 11 MG/DL (ref 6–20)
CALCIUM SERPL-MCNC: 9.9 MG/DL (ref 8.7–10.5)
CHLORIDE SERPL-SCNC: 107 MMOL/L (ref 95–110)
CO2 SERPL-SCNC: 24 MMOL/L (ref 23–29)
CREAT SERPL-MCNC: 0.9 MG/DL (ref 0.5–1.4)
EST. GFR  (AFRICAN AMERICAN): >60 ML/MIN/1.73 M^2
EST. GFR  (NON AFRICAN AMERICAN): >60 ML/MIN/1.73 M^2
GLUCOSE SERPL-MCNC: 93 MG/DL (ref 70–110)
POTASSIUM SERPL-SCNC: 3.4 MMOL/L (ref 3.5–5.1)
SODIUM SERPL-SCNC: 142 MMOL/L (ref 136–145)

## 2021-10-01 PROCEDURE — 90471 IMMUNIZATION ADMIN: CPT | Mod: S$GLB,,, | Performed by: INTERNAL MEDICINE

## 2021-10-01 PROCEDURE — 3078F PR MOST RECENT DIASTOLIC BLOOD PRESSURE < 80 MM HG: ICD-10-PCS | Mod: CPTII,S$GLB,, | Performed by: INTERNAL MEDICINE

## 2021-10-01 PROCEDURE — 3074F SYST BP LT 130 MM HG: CPT | Mod: CPTII,S$GLB,, | Performed by: INTERNAL MEDICINE

## 2021-10-01 PROCEDURE — 1160F RVW MEDS BY RX/DR IN RCRD: CPT | Mod: CPTII,S$GLB,, | Performed by: INTERNAL MEDICINE

## 2021-10-01 PROCEDURE — 3008F BODY MASS INDEX DOCD: CPT | Mod: CPTII,S$GLB,, | Performed by: INTERNAL MEDICINE

## 2021-10-01 PROCEDURE — 80048 BASIC METABOLIC PNL TOTAL CA: CPT | Performed by: INTERNAL MEDICINE

## 2021-10-01 PROCEDURE — 3061F PR NEG MICROALBUMINURIA RESULT DOCUMENTED/REVIEW: ICD-10-PCS | Mod: CPTII,S$GLB,, | Performed by: INTERNAL MEDICINE

## 2021-10-01 PROCEDURE — 1159F PR MEDICATION LIST DOCUMENTED IN MEDICAL RECORD: ICD-10-PCS | Mod: CPTII,S$GLB,, | Performed by: INTERNAL MEDICINE

## 2021-10-01 PROCEDURE — 1160F PR REVIEW ALL MEDS BY PRESCRIBER/CLIN PHARMACIST DOCUMENTED: ICD-10-PCS | Mod: CPTII,S$GLB,, | Performed by: INTERNAL MEDICINE

## 2021-10-01 PROCEDURE — 3078F DIAST BP <80 MM HG: CPT | Mod: CPTII,S$GLB,, | Performed by: INTERNAL MEDICINE

## 2021-10-01 PROCEDURE — 90471 FLU VACCINE (QUAD) GREATER THAN OR EQUAL TO 3YO PRESERVATIVE FREE IM: ICD-10-PCS | Mod: S$GLB,,, | Performed by: INTERNAL MEDICINE

## 2021-10-01 PROCEDURE — 3074F PR MOST RECENT SYSTOLIC BLOOD PRESSURE < 130 MM HG: ICD-10-PCS | Mod: CPTII,S$GLB,, | Performed by: INTERNAL MEDICINE

## 2021-10-01 PROCEDURE — 3044F PR MOST RECENT HEMOGLOBIN A1C LEVEL <7.0%: ICD-10-PCS | Mod: CPTII,S$GLB,, | Performed by: INTERNAL MEDICINE

## 2021-10-01 PROCEDURE — 1159F MED LIST DOCD IN RCRD: CPT | Mod: CPTII,S$GLB,, | Performed by: INTERNAL MEDICINE

## 2021-10-01 PROCEDURE — 3066F NEPHROPATHY DOC TX: CPT | Mod: CPTII,S$GLB,, | Performed by: INTERNAL MEDICINE

## 2021-10-01 PROCEDURE — 99214 OFFICE O/P EST MOD 30 MIN: CPT | Mod: 25,S$GLB,, | Performed by: INTERNAL MEDICINE

## 2021-10-01 PROCEDURE — 90472 ZOSTER RECOMBINANT VACCINE: ICD-10-PCS | Mod: S$GLB,,, | Performed by: INTERNAL MEDICINE

## 2021-10-01 PROCEDURE — 3061F NEG MICROALBUMINURIA REV: CPT | Mod: CPTII,S$GLB,, | Performed by: INTERNAL MEDICINE

## 2021-10-01 PROCEDURE — 3008F PR BODY MASS INDEX (BMI) DOCUMENTED: ICD-10-PCS | Mod: CPTII,S$GLB,, | Performed by: INTERNAL MEDICINE

## 2021-10-01 PROCEDURE — 90686 IIV4 VACC NO PRSV 0.5 ML IM: CPT | Mod: S$GLB,,, | Performed by: INTERNAL MEDICINE

## 2021-10-01 PROCEDURE — 90750 HZV VACC RECOMBINANT IM: CPT | Mod: S$GLB,,, | Performed by: INTERNAL MEDICINE

## 2021-10-01 PROCEDURE — 3066F PR DOCUMENTATION OF TREATMENT FOR NEPHROPATHY: ICD-10-PCS | Mod: CPTII,S$GLB,, | Performed by: INTERNAL MEDICINE

## 2021-10-01 PROCEDURE — 90750 ZOSTER RECOMBINANT VACCINE: ICD-10-PCS | Mod: S$GLB,,, | Performed by: INTERNAL MEDICINE

## 2021-10-01 PROCEDURE — 99999 PR PBB SHADOW E&M-EST. PATIENT-LVL IV: ICD-10-PCS | Mod: PBBFAC,,, | Performed by: INTERNAL MEDICINE

## 2021-10-01 PROCEDURE — 3044F HG A1C LEVEL LT 7.0%: CPT | Mod: CPTII,S$GLB,, | Performed by: INTERNAL MEDICINE

## 2021-10-01 PROCEDURE — 99214 PR OFFICE/OUTPT VISIT, EST, LEVL IV, 30-39 MIN: ICD-10-PCS | Mod: 25,S$GLB,, | Performed by: INTERNAL MEDICINE

## 2021-10-01 PROCEDURE — 99999 PR PBB SHADOW E&M-EST. PATIENT-LVL IV: CPT | Mod: PBBFAC,,, | Performed by: INTERNAL MEDICINE

## 2021-10-01 PROCEDURE — 90686 FLU VACCINE (QUAD) GREATER THAN OR EQUAL TO 3YO PRESERVATIVE FREE IM: ICD-10-PCS | Mod: S$GLB,,, | Performed by: INTERNAL MEDICINE

## 2021-10-01 PROCEDURE — 36415 COLL VENOUS BLD VENIPUNCTURE: CPT | Performed by: INTERNAL MEDICINE

## 2021-10-01 PROCEDURE — 90472 IMMUNIZATION ADMIN EACH ADD: CPT | Mod: S$GLB,,, | Performed by: INTERNAL MEDICINE

## 2021-10-01 RX ORDER — POTASSIUM CHLORIDE 750 MG/1
10 TABLET, EXTENDED RELEASE ORAL DAILY
COMMUNITY
Start: 2021-09-17 | End: 2022-04-29

## 2021-10-01 RX ORDER — AMLODIPINE BESYLATE 5 MG/1
5 TABLET ORAL DAILY
Qty: 30 TABLET | Refills: 6 | Status: SHIPPED | OUTPATIENT
Start: 2021-10-01 | End: 2022-04-19

## 2021-10-11 ENCOUNTER — PATIENT MESSAGE (OUTPATIENT)
Dept: INTERNAL MEDICINE | Facility: CLINIC | Age: 56
End: 2021-10-11

## 2021-10-11 DIAGNOSIS — F41.9 ANXIETY AND DEPRESSION: ICD-10-CM

## 2021-10-11 DIAGNOSIS — F32.A ANXIETY AND DEPRESSION: ICD-10-CM

## 2021-10-11 RX ORDER — DULOXETIN HYDROCHLORIDE 60 MG/1
60 CAPSULE, DELAYED RELEASE ORAL DAILY
Qty: 90 CAPSULE | Refills: 1 | Status: SHIPPED | OUTPATIENT
Start: 2021-10-11 | End: 2022-04-01

## 2021-10-11 RX ORDER — DULOXETIN HYDROCHLORIDE 60 MG/1
60 CAPSULE, DELAYED RELEASE ORAL DAILY
Qty: 90 CAPSULE | Refills: 1 | Status: CANCELLED | OUTPATIENT
Start: 2021-10-11 | End: 2022-10-11

## 2021-10-26 ENCOUNTER — PATIENT MESSAGE (OUTPATIENT)
Dept: ENDOSCOPY | Facility: HOSPITAL | Age: 56
End: 2021-10-26
Payer: COMMERCIAL

## 2021-10-26 ENCOUNTER — TELEPHONE (OUTPATIENT)
Dept: GASTROENTEROLOGY | Facility: CLINIC | Age: 56
End: 2021-10-26
Payer: COMMERCIAL

## 2021-10-26 ENCOUNTER — PATIENT MESSAGE (OUTPATIENT)
Dept: GASTROENTEROLOGY | Facility: CLINIC | Age: 56
End: 2021-10-26
Payer: COMMERCIAL

## 2021-10-26 ENCOUNTER — TELEPHONE (OUTPATIENT)
Dept: INTERNAL MEDICINE | Facility: CLINIC | Age: 56
End: 2021-10-26
Payer: COMMERCIAL

## 2021-10-26 RX ORDER — SODIUM, POTASSIUM,MAG SULFATES 17.5-3.13G
1 SOLUTION, RECONSTITUTED, ORAL ORAL DAILY
Qty: 1 KIT | Refills: 0 | Status: SHIPPED | OUTPATIENT
Start: 2021-10-26 | End: 2021-10-28

## 2022-01-04 ENCOUNTER — LAB VISIT (OUTPATIENT)
Dept: LAB | Facility: HOSPITAL | Age: 57
End: 2022-01-04
Attending: PHYSICIAN ASSISTANT
Payer: COMMERCIAL

## 2022-01-04 ENCOUNTER — OFFICE VISIT (OUTPATIENT)
Dept: INTERNAL MEDICINE | Facility: CLINIC | Age: 57
End: 2022-01-04
Payer: COMMERCIAL

## 2022-01-04 VITALS
BODY MASS INDEX: 37.73 KG/M2 | RESPIRATION RATE: 18 BRPM | DIASTOLIC BLOOD PRESSURE: 78 MMHG | SYSTOLIC BLOOD PRESSURE: 118 MMHG | HEART RATE: 92 BPM | TEMPERATURE: 97 F | HEIGHT: 65 IN | OXYGEN SATURATION: 96 % | WEIGHT: 226.44 LBS

## 2022-01-04 DIAGNOSIS — R42 DIZZINESS: ICD-10-CM

## 2022-01-04 DIAGNOSIS — J32.9 BACTERIAL SINUSITIS: Primary | ICD-10-CM

## 2022-01-04 DIAGNOSIS — E11.9 TYPE 2 DIABETES MELLITUS WITHOUT COMPLICATION, WITHOUT LONG-TERM CURRENT USE OF INSULIN: ICD-10-CM

## 2022-01-04 DIAGNOSIS — B96.89 BACTERIAL SINUSITIS: Primary | ICD-10-CM

## 2022-01-04 DIAGNOSIS — I10 HYPERTENSION GOAL BP (BLOOD PRESSURE) < 130/80: Chronic | ICD-10-CM

## 2022-01-04 LAB
ALBUMIN SERPL BCP-MCNC: 3.9 G/DL (ref 3.5–5.2)
ALP SERPL-CCNC: 85 U/L (ref 55–135)
ALT SERPL W/O P-5'-P-CCNC: 12 U/L (ref 10–44)
ANION GAP SERPL CALC-SCNC: 11 MMOL/L (ref 8–16)
AST SERPL-CCNC: 15 U/L (ref 10–40)
BASOPHILS # BLD AUTO: 0.04 K/UL (ref 0–0.2)
BASOPHILS NFR BLD: 0.5 % (ref 0–1.9)
BILIRUB SERPL-MCNC: 0.5 MG/DL (ref 0.1–1)
BUN SERPL-MCNC: 8 MG/DL (ref 6–20)
CALCIUM SERPL-MCNC: 9.9 MG/DL (ref 8.7–10.5)
CHLORIDE SERPL-SCNC: 110 MMOL/L (ref 95–110)
CO2 SERPL-SCNC: 22 MMOL/L (ref 23–29)
CREAT SERPL-MCNC: 0.9 MG/DL (ref 0.5–1.4)
CTP QC/QA: YES
DIFFERENTIAL METHOD: ABNORMAL
EOSINOPHIL # BLD AUTO: 0.1 K/UL (ref 0–0.5)
EOSINOPHIL NFR BLD: 1.4 % (ref 0–8)
ERYTHROCYTE [DISTWIDTH] IN BLOOD BY AUTOMATED COUNT: 13.9 % (ref 11.5–14.5)
EST. GFR  (AFRICAN AMERICAN): >60 ML/MIN/1.73 M^2
EST. GFR  (NON AFRICAN AMERICAN): >60 ML/MIN/1.73 M^2
GLUCOSE SERPL-MCNC: 105 MG/DL (ref 70–110)
HCT VFR BLD AUTO: 43.3 % (ref 37–48.5)
HGB BLD-MCNC: 13.7 G/DL (ref 12–16)
IMM GRANULOCYTES # BLD AUTO: 0.02 K/UL (ref 0–0.04)
IMM GRANULOCYTES NFR BLD AUTO: 0.3 % (ref 0–0.5)
LYMPHOCYTES # BLD AUTO: 2.5 K/UL (ref 1–4.8)
LYMPHOCYTES NFR BLD: 30.9 % (ref 18–48)
MCH RBC QN AUTO: 26.6 PG (ref 27–31)
MCHC RBC AUTO-ENTMCNC: 31.6 G/DL (ref 32–36)
MCV RBC AUTO: 84 FL (ref 82–98)
MONOCYTES # BLD AUTO: 0.4 K/UL (ref 0.3–1)
MONOCYTES NFR BLD: 5.4 % (ref 4–15)
NEUTROPHILS # BLD AUTO: 4.9 K/UL (ref 1.8–7.7)
NEUTROPHILS NFR BLD: 61.5 % (ref 38–73)
NRBC BLD-RTO: 0 /100 WBC
PLATELET # BLD AUTO: 310 K/UL (ref 150–450)
PMV BLD AUTO: 11.6 FL (ref 9.2–12.9)
PROT SERPL-MCNC: 8 G/DL (ref 6–8.4)
RBC # BLD AUTO: 5.16 M/UL (ref 4–5.4)
SARS-COV-2 RDRP RESP QL NAA+PROBE: NEGATIVE
SODIUM SERPL-SCNC: 143 MMOL/L (ref 136–145)
WBC # BLD AUTO: 7.93 K/UL (ref 3.9–12.7)

## 2022-01-04 PROCEDURE — 1160F PR REVIEW ALL MEDS BY PRESCRIBER/CLIN PHARMACIST DOCUMENTED: ICD-10-PCS | Mod: CPTII,S$GLB,, | Performed by: PHYSICIAN ASSISTANT

## 2022-01-04 PROCEDURE — U0002 COVID-19 LAB TEST NON-CDC: HCPCS | Mod: QW,S$GLB,, | Performed by: PHYSICIAN ASSISTANT

## 2022-01-04 PROCEDURE — 80053 COMPREHEN METABOLIC PANEL: CPT | Performed by: PHYSICIAN ASSISTANT

## 2022-01-04 PROCEDURE — 85025 COMPLETE CBC W/AUTO DIFF WBC: CPT | Performed by: PHYSICIAN ASSISTANT

## 2022-01-04 PROCEDURE — 99999 PR PBB SHADOW E&M-EST. PATIENT-LVL IV: CPT | Mod: PBBFAC,,, | Performed by: PHYSICIAN ASSISTANT

## 2022-01-04 PROCEDURE — 99999 PR PBB SHADOW E&M-EST. PATIENT-LVL IV: ICD-10-PCS | Mod: PBBFAC,,, | Performed by: PHYSICIAN ASSISTANT

## 2022-01-04 PROCEDURE — 3008F BODY MASS INDEX DOCD: CPT | Mod: CPTII,S$GLB,, | Performed by: PHYSICIAN ASSISTANT

## 2022-01-04 PROCEDURE — 1159F MED LIST DOCD IN RCRD: CPT | Mod: CPTII,S$GLB,, | Performed by: PHYSICIAN ASSISTANT

## 2022-01-04 PROCEDURE — 3008F PR BODY MASS INDEX (BMI) DOCUMENTED: ICD-10-PCS | Mod: CPTII,S$GLB,, | Performed by: PHYSICIAN ASSISTANT

## 2022-01-04 PROCEDURE — U0002: ICD-10-PCS | Mod: QW,S$GLB,, | Performed by: PHYSICIAN ASSISTANT

## 2022-01-04 PROCEDURE — 3074F SYST BP LT 130 MM HG: CPT | Mod: CPTII,S$GLB,, | Performed by: PHYSICIAN ASSISTANT

## 2022-01-04 PROCEDURE — 3072F LOW RISK FOR RETINOPATHY: CPT | Mod: CPTII,S$GLB,, | Performed by: PHYSICIAN ASSISTANT

## 2022-01-04 PROCEDURE — 3072F PR LOW RISK FOR RETINOPATHY: ICD-10-PCS | Mod: CPTII,S$GLB,, | Performed by: PHYSICIAN ASSISTANT

## 2022-01-04 PROCEDURE — 99214 OFFICE O/P EST MOD 30 MIN: CPT | Mod: S$GLB,,, | Performed by: PHYSICIAN ASSISTANT

## 2022-01-04 PROCEDURE — 3078F DIAST BP <80 MM HG: CPT | Mod: CPTII,S$GLB,, | Performed by: PHYSICIAN ASSISTANT

## 2022-01-04 PROCEDURE — 3074F PR MOST RECENT SYSTOLIC BLOOD PRESSURE < 130 MM HG: ICD-10-PCS | Mod: CPTII,S$GLB,, | Performed by: PHYSICIAN ASSISTANT

## 2022-01-04 PROCEDURE — 1160F RVW MEDS BY RX/DR IN RCRD: CPT | Mod: CPTII,S$GLB,, | Performed by: PHYSICIAN ASSISTANT

## 2022-01-04 PROCEDURE — 99214 PR OFFICE/OUTPT VISIT, EST, LEVL IV, 30-39 MIN: ICD-10-PCS | Mod: S$GLB,,, | Performed by: PHYSICIAN ASSISTANT

## 2022-01-04 PROCEDURE — 36415 COLL VENOUS BLD VENIPUNCTURE: CPT | Performed by: PHYSICIAN ASSISTANT

## 2022-01-04 PROCEDURE — 3078F PR MOST RECENT DIASTOLIC BLOOD PRESSURE < 80 MM HG: ICD-10-PCS | Mod: CPTII,S$GLB,, | Performed by: PHYSICIAN ASSISTANT

## 2022-01-04 PROCEDURE — 1159F PR MEDICATION LIST DOCUMENTED IN MEDICAL RECORD: ICD-10-PCS | Mod: CPTII,S$GLB,, | Performed by: PHYSICIAN ASSISTANT

## 2022-01-04 RX ORDER — AMOXICILLIN AND CLAVULANATE POTASSIUM 875; 125 MG/1; MG/1
1 TABLET, FILM COATED ORAL 2 TIMES DAILY
Qty: 14 TABLET | Refills: 0 | Status: SHIPPED | OUTPATIENT
Start: 2022-01-04 | End: 2022-01-11

## 2022-01-04 RX ORDER — FLUTICASONE PROPIONATE 50 MCG
2 SPRAY, SUSPENSION (ML) NASAL DAILY
Qty: 9.9 ML | Refills: 0 | Status: SHIPPED | OUTPATIENT
Start: 2022-01-04 | End: 2022-12-19 | Stop reason: SDUPTHER

## 2022-01-04 RX ORDER — MECLIZINE HYDROCHLORIDE 25 MG/1
25 TABLET ORAL 3 TIMES DAILY PRN
Qty: 30 TABLET | Refills: 0 | Status: SHIPPED | OUTPATIENT
Start: 2022-01-04

## 2022-01-04 NOTE — PROGRESS NOTES
"Subjective:      Patient ID: Leydi Quintero is a 56 y.o. female.    Chief Complaint: No chief complaint on file.    Patient is known to me, being seen today for dizziness and fatigue x1wk without improvement.  Symptoms are intermittent, worse with position changes.      URI symptoms x1wk, has tried singulair     Has received COVID vaccine and booster     BP controlled, blood sugar 160s, denies low sugar readings     Last visit October 2021     Review of Systems   Constitutional: Negative for activity change, chills, diaphoresis, fever and unexpected weight change.   HENT: Positive for congestion, rhinorrhea, sinus pressure and sinus pain. Negative for ear pain, hearing loss, sore throat and trouble swallowing.    Eyes: Positive for discharge. Negative for visual disturbance.   Respiratory: Negative for cough, chest tightness, shortness of breath and wheezing.    Cardiovascular: Negative for chest pain and palpitations.   Gastrointestinal: Negative for abdominal pain, blood in stool, constipation, diarrhea, nausea and vomiting.   Endocrine: Negative for polydipsia and polyuria.   Genitourinary: Negative for difficulty urinating, dysuria, hematuria and menstrual problem.   Musculoskeletal: Positive for arthralgias and joint swelling. Negative for myalgias and neck pain.   Skin: Negative for rash.   Neurological: Positive for dizziness and headaches. Negative for weakness and light-headedness.   Psychiatric/Behavioral: Negative for confusion and dysphoric mood.       Objective:   /78 (BP Location: Left arm, Patient Position: Sitting, BP Method: Large (Manual))   Pulse 92   Temp 97.2 °F (36.2 °C)   Resp 18   Ht 5' 5" (1.651 m)   Wt 102.7 kg (226 lb 6.6 oz)   LMP 11/23/2013   SpO2 96%   BMI 37.68 kg/m²   Physical Exam  Constitutional:       General: She is not in acute distress.     Appearance: She is well-developed and well-nourished. She is not ill-appearing or diaphoretic.   HENT:      Head: " Normocephalic and atraumatic.      Right Ear: Tympanic membrane and ear canal normal. No middle ear effusion. Tympanic membrane is not erythematous.      Left Ear: Tympanic membrane and ear canal normal.  No middle ear effusion. Tympanic membrane is not erythematous.      Ears:      Comments: Moderate cerumen L ear     Nose: Mucosal edema present.      Right Sinus: Maxillary sinus tenderness and frontal sinus tenderness present.      Left Sinus: Maxillary sinus tenderness and frontal sinus tenderness present.      Mouth/Throat:      Mouth: Oropharynx is clear and moist.      Pharynx: Uvula midline. No posterior oropharyngeal erythema.        Comments: Signs of PNDCardiovascular:      Rate and Rhythm: Normal rate and regular rhythm.      Heart sounds: Normal heart sounds. No murmur heard.      Pulmonary:      Effort: Pulmonary effort is normal. No respiratory distress.      Breath sounds: Normal breath sounds. No decreased breath sounds, wheezing, rhonchi or rales.   Lymphadenopathy:      Cervical: No cervical adenopathy.   Skin:     General: Skin is warm and dry.      Findings: No rash.   Psychiatric:         Mood and Affect: Mood and affect normal.         Speech: Speech normal.         Behavior: Behavior normal.         Thought Content: Thought content normal.         Cognition and Memory: Cognition and memory normal.       Assessment:      1. Bacterial sinusitis    2. Dizziness    3. Hypertension goal BP (blood pressure) < 130/80    4. Type 2 diabetes mellitus without complication, without long-term current use of insulin       Plan:   Bacterial sinusitis  -     POCT COVID-19 Rapid Screening  -     amoxicillin-clavulanate 875-125mg (AUGMENTIN) 875-125 mg per tablet; Take 1 tablet by mouth 2 (two) times daily. for 7 days  Dispense: 14 tablet; Refill: 0    Dizziness  -     CBC Auto Differential; Future; Expected date: 01/04/2022  -     Comprehensive Metabolic Panel; Future; Expected date: 01/04/2022  -      meclizine (ANTIVERT) 25 mg tablet; Take 1 tablet (25 mg total) by mouth 3 (three) times daily as needed for Dizziness.  Dispense: 30 tablet; Refill: 0    Hypertension goal BP (blood pressure) < 130/80   Controlled     Type 2 diabetes mellitus without complication, without long-term current use of insulin   A1c 5.8%    Discussed worsening signs/symptoms and when to return to clinic or go to ED.   Patient expresses understanding and agrees with treatment plan.

## 2022-01-20 ENCOUNTER — PATIENT MESSAGE (OUTPATIENT)
Dept: INTERNAL MEDICINE | Facility: CLINIC | Age: 57
End: 2022-01-20
Payer: COMMERCIAL

## 2022-01-20 RX ORDER — POLYMYXIN B SULFATE AND TRIMETHOPRIM 1; 10000 MG/ML; [USP'U]/ML
1 SOLUTION OPHTHALMIC EVERY 6 HOURS
Qty: 10 ML | Refills: 0 | Status: SHIPPED | OUTPATIENT
Start: 2022-01-20

## 2022-01-29 DIAGNOSIS — I10 HYPERTENSION GOAL BP (BLOOD PRESSURE) < 130/80: Chronic | ICD-10-CM

## 2022-01-29 RX ORDER — CHLORTHALIDONE 25 MG/1
TABLET ORAL
Qty: 90 TABLET | Refills: 1 | OUTPATIENT
Start: 2022-01-29

## 2022-01-29 NOTE — TELEPHONE ENCOUNTER
No new care gaps identified.  Powered by [employer]. Reference number: [id number].   1/29/2022 5:15:44 AM CST

## 2022-01-30 NOTE — TELEPHONE ENCOUNTER
Quick DC. Inappropriate Request    Refill Authorization Note   Leydi Quintero  is requesting a refill authorization.  Brief Assessment and Rationale for Refill:  Quick Discontinue  Medication Therapy Plan:       Medication Reconciliation Completed:  No      Comments:   Pended Medication(s)       Requested Prescriptions     Refused Prescriptions Disp Refills    chlorthalidone (HYGROTEN) 25 MG Tab [Pharmacy Med Name: CHLORTHALIDONE 25MG TABLETS] 90 tablet 1     Sig: TAKE 1 TABLET(25 MG) BY MOUTH EVERY DAY     Refused By: LUCIA WALKER     Reason for Refusal: Refill not appropriate        Duplicate Pended Encounter(s)/ Last Prescribed Details: (includes pharmacy & prescriber details)   chlorthalidone (HYGROTEN) 25 MG Tab [[id number]]  DISCONTINUED    Order Details  Dose, Route, Frequency: As Directed   Dispense Quantity: 90 tablet Refills: 1    Note to Pharmacy: Please inactivate all prior scripts with same name and strength including any scripts on hold.         Sig: TAKE 1 TABLET(25 MG) BY MOUTH EVERY DAY         Start Date: 09/16/21 End Date: 09/17/21   Discontinued by: Miranda Chavez DO on 9/17/2021 11:38   Reason: Alternate therapy         Written Date: 09/16/21 Expiration Date: 09/16/22       Diagnosis Association: Hypertension goal BP (blood pressure) < 130/80 (I10)   Original Order:  chlorthalidone (HYGROTEN) 25 MG Tab [[id number]]                Note composed:7:03 PM 01/29/2022

## 2022-03-23 ENCOUNTER — PATIENT OUTREACH (OUTPATIENT)
Dept: ADMINISTRATIVE | Facility: HOSPITAL | Age: 57
End: 2022-03-23
Payer: COMMERCIAL

## 2022-03-23 DIAGNOSIS — E11.9 TYPE 2 DIABETES MELLITUS WITHOUT COMPLICATION, WITHOUT LONG-TERM CURRENT USE OF INSULIN: Primary | ICD-10-CM

## 2022-03-30 DIAGNOSIS — F41.9 ANXIETY AND DEPRESSION: ICD-10-CM

## 2022-03-30 DIAGNOSIS — F32.A ANXIETY AND DEPRESSION: ICD-10-CM

## 2022-03-30 NOTE — TELEPHONE ENCOUNTER
Care Due:                  Date            Visit Type   Department     Provider  --------------------------------------------------------------------------------                                EP -                              PRIMARY      [facility] INTERNAL  Last Visit: 10-      CARE ([facility])   SHEBA Chavez                              MYCHART                              FOLLOWUP/OF  [facility] INTER[facility]  Next Visit: 04-      [facility] VISIT   [facility]       Miranda Chavez                                                            Last  Test          Frequency    Reason                     Performed    Due Date  --------------------------------------------------------------------------------    HBA1C.......  6 months...  dulaglutide..............  08- 02-    Powered by [employer] by [employer]. Reference number: [id number].   3/30/2022 5:15:17 AM CDT

## 2022-03-31 NOTE — TELEPHONE ENCOUNTER
Refill Routing Note   Medication(s) are not appropriate for processing by [facility] for the following reason(s):      - Medication not previously prescribed by PCP    [facility] action(s):  Defer Medication-related problems identified: Requires labs        --->Care Gap information included in message below if applicable.   Medication reconciliation completed: No   Automatic Epic Generated Protocol Data:        Requested Prescriptions   Pending Prescriptions Disp Refills    DULoxetine (CYMBALTA) 60 MG capsule [Pharmacy Med Name: DULOXETINE DR 60MG CAPSULES] 90 capsule 1     Sig: TAKE 1 CAPSULE(60 MG) BY MOUTH EVERY DAY       Psychiatry: Antidepressants - SNRI Failed - 3/31/2022 10:09 AM        Failed - Matches previous order       Previous Authorizing Provider: Leo Mckinley MD (DULoxetine (CYMBALTA) 60 MG capsule)  Previous Pharmacy: [id number] [place], [place] - [address] AT [facility] [address]            Passed - Patient is at least 18 years old        Passed - Last BP in normal range within 360 days     BP Readings from Last 3 Encounters:   01/04/22 118/78   10/01/21 126/76   09/16/21 110/62               Passed - Valid encounter within last 15 months     Recent Visits  Date Type Provider Dept   10/01/21 Office Visit Miranda Chavez DO [facility]   09/16/21 Office Visit Miranda Chavez DO [facility]   11/25/20 Office Visit Miranda Chavez DO [facility]   09/09/20 Office Visit Miranda Chavez DO [facility]   07/30/20 Office Visit Miranda Chavez DO [facility]   05/28/20 Office Visit Miranda Chavez DO [facility]   Showing recent visits within past 720 days and meeting all other requirements  Future Appointments  No visits were found meeting these conditions.  Showing future appointments within next 150 days and meeting all other requirements      Future Appointments              Tomorrow Darlene Peters,  MANDO Perez - Internal Medicine, [facility]    In 5 days DO Chris Elizondo - Internal Medicine, [facility]                Passed - No ED/Hospital visits since last PCP visit     Last PCP Visit: 10/1/2021 Last Admission:  Last ED Visit: 3/7/2015          Passed - Cr is 1.39 or below and within 360 days     Lab Results   Component Value Date    CREATININE 0.9 01/04/2022    CREATININE 0.9 10/01/2021    CREATININE 0.9 09/16/2021              Passed - eGFR is 30 or above and within 360 days     Lab Results   Component Value Date    EGFRNONAA >60 01/04/2022    EGFRNONAA >60.0 10/01/2021    EGFRNONAA >60.0 09/16/2021                      Appointments  past 12m or future 3m with PCP    Date Provider   Last Visit   10/1/2021 Miranda Chavez DO   Next Visit   4/5/2022 Miranda Chavez DO   ED visits in past 90 days: 0        Note composed:10:11 AM 03/31/2022

## 2022-04-01 RX ORDER — DULOXETIN HYDROCHLORIDE 60 MG/1
CAPSULE, DELAYED RELEASE ORAL
Qty: 90 CAPSULE | Refills: 1 | Status: SHIPPED | OUTPATIENT
Start: 2022-04-01 | End: 2022-09-28

## 2022-04-04 ENCOUNTER — PATIENT MESSAGE (OUTPATIENT)
Dept: INTERNAL MEDICINE | Facility: CLINIC | Age: 57
End: 2022-04-04
Payer: COMMERCIAL

## 2022-04-19 DIAGNOSIS — I10 HYPERTENSION GOAL BP (BLOOD PRESSURE) < 130/80: Chronic | ICD-10-CM

## 2022-04-19 RX ORDER — AMLODIPINE BESYLATE 5 MG/1
TABLET ORAL
Qty: 90 TABLET | Refills: 1 | Status: SHIPPED | OUTPATIENT
Start: 2022-04-19 | End: 2022-09-28

## 2022-04-19 NOTE — TELEPHONE ENCOUNTER
Refill Authorization Note   Orlandohaley Quintero  is requesting a refill authorization.  Brief Assessment and Rationale for Refill:  Approve     Medication Therapy Plan:       Medication Reconciliation Completed: No   Comments:     No Care Gaps recommended.     Note composed:12:06 PM 04/19/2022

## 2022-04-19 NOTE — TELEPHONE ENCOUNTER
No new care gaps identified.  Powered by [employer]. Reference number: [id number].   4/19/2022 3:34:40 AM CDT

## 2022-04-25 ENCOUNTER — PATIENT MESSAGE (OUTPATIENT)
Dept: ADMINISTRATIVE | Facility: OTHER | Age: 57
End: 2022-04-25
Payer: COMMERCIAL

## 2022-04-28 ENCOUNTER — LAB VISIT (OUTPATIENT)
Dept: LAB | Facility: HOSPITAL | Age: 57
End: 2022-04-28
Attending: PHYSICIAN ASSISTANT
Payer: COMMERCIAL

## 2022-04-28 ENCOUNTER — OFFICE VISIT (OUTPATIENT)
Dept: INTERNAL MEDICINE | Facility: CLINIC | Age: 57
End: 2022-04-28
Payer: COMMERCIAL

## 2022-04-28 ENCOUNTER — TELEPHONE (OUTPATIENT)
Dept: PAIN MEDICINE | Facility: CLINIC | Age: 57
End: 2022-04-28
Payer: COMMERCIAL

## 2022-04-28 ENCOUNTER — PATIENT OUTREACH (OUTPATIENT)
Dept: ADMINISTRATIVE | Facility: OTHER | Age: 57
End: 2022-04-28
Payer: COMMERCIAL

## 2022-04-28 VITALS
HEIGHT: 65 IN | BODY MASS INDEX: 38.64 KG/M2 | WEIGHT: 231.94 LBS | RESPIRATION RATE: 18 BRPM | DIASTOLIC BLOOD PRESSURE: 86 MMHG | HEART RATE: 81 BPM | TEMPERATURE: 98 F | SYSTOLIC BLOOD PRESSURE: 128 MMHG | OXYGEN SATURATION: 98 %

## 2022-04-28 DIAGNOSIS — Z12.31 ENCOUNTER FOR SCREENING MAMMOGRAM FOR MALIGNANT NEOPLASM OF BREAST: ICD-10-CM

## 2022-04-28 DIAGNOSIS — I10 HYPERTENSION GOAL BP (BLOOD PRESSURE) < 130/80: Chronic | ICD-10-CM

## 2022-04-28 DIAGNOSIS — Z12.11 SCREEN FOR COLON CANCER: ICD-10-CM

## 2022-04-28 DIAGNOSIS — E78.5 HYPERLIPIDEMIA LDL GOAL <70: Primary | Chronic | ICD-10-CM

## 2022-04-28 DIAGNOSIS — E11.9 TYPE 2 DIABETES MELLITUS WITHOUT COMPLICATION, WITHOUT LONG-TERM CURRENT USE OF INSULIN: ICD-10-CM

## 2022-04-28 DIAGNOSIS — E78.5 HYPERLIPIDEMIA LDL GOAL <70: Chronic | ICD-10-CM

## 2022-04-28 DIAGNOSIS — Z23 NEED FOR SHINGLES VACCINE: ICD-10-CM

## 2022-04-28 DIAGNOSIS — M54.16 LUMBAR RADICULOPATHY: ICD-10-CM

## 2022-04-28 LAB
BASOPHILS # BLD AUTO: 0.05 K/UL (ref 0–0.2)
BASOPHILS NFR BLD: 0.7 % (ref 0–1.9)
DIFFERENTIAL METHOD: ABNORMAL
EOSINOPHIL # BLD AUTO: 0.1 K/UL (ref 0–0.5)
EOSINOPHIL NFR BLD: 1.7 % (ref 0–8)
ERYTHROCYTE [DISTWIDTH] IN BLOOD BY AUTOMATED COUNT: 13.3 % (ref 11.5–14.5)
HCT VFR BLD AUTO: 40.9 % (ref 37–48.5)
HGB BLD-MCNC: 12.5 G/DL (ref 12–16)
IMM GRANULOCYTES # BLD AUTO: 0.02 K/UL (ref 0–0.04)
IMM GRANULOCYTES NFR BLD AUTO: 0.3 % (ref 0–0.5)
LYMPHOCYTES # BLD AUTO: 2.4 K/UL (ref 1–4.8)
LYMPHOCYTES NFR BLD: 34.6 % (ref 18–48)
MCH RBC QN AUTO: 26.4 PG (ref 27–31)
MCHC RBC AUTO-ENTMCNC: 30.6 G/DL (ref 32–36)
MCV RBC AUTO: 86 FL (ref 82–98)
MONOCYTES # BLD AUTO: 0.4 K/UL (ref 0.3–1)
MONOCYTES NFR BLD: 6.4 % (ref 4–15)
NEUTROPHILS # BLD AUTO: 3.9 K/UL (ref 1.8–7.7)
NEUTROPHILS NFR BLD: 56.3 % (ref 38–73)
NRBC BLD-RTO: 0 /100 WBC
PLATELET # BLD AUTO: 287 K/UL (ref 150–450)
PMV BLD AUTO: 12.5 FL (ref 9.2–12.9)
RBC # BLD AUTO: 4.74 M/UL (ref 4–5.4)
WBC # BLD AUTO: 6.9 K/UL (ref 3.9–12.7)

## 2022-04-28 PROCEDURE — 1159F MED LIST DOCD IN RCRD: CPT | Mod: CPTII,S$GLB,, | Performed by: PHYSICIAN ASSISTANT

## 2022-04-28 PROCEDURE — 83036 HEMOGLOBIN GLYCOSYLATED A1C: CPT | Performed by: PHYSICIAN ASSISTANT

## 2022-04-28 PROCEDURE — 3008F BODY MASS INDEX DOCD: CPT | Mod: CPTII,S$GLB,, | Performed by: PHYSICIAN ASSISTANT

## 2022-04-28 PROCEDURE — 80061 LIPID PANEL: CPT | Performed by: PHYSICIAN ASSISTANT

## 2022-04-28 PROCEDURE — 3008F PR BODY MASS INDEX (BMI) DOCUMENTED: ICD-10-PCS | Mod: CPTII,S$GLB,, | Performed by: PHYSICIAN ASSISTANT

## 2022-04-28 PROCEDURE — 99999 PR PBB SHADOW E&M-EST. PATIENT-LVL V: CPT | Mod: PBBFAC,,, | Performed by: PHYSICIAN ASSISTANT

## 2022-04-28 PROCEDURE — 99214 PR OFFICE/OUTPT VISIT, EST, LEVL IV, 30-39 MIN: ICD-10-PCS | Mod: 25,S$GLB,, | Performed by: PHYSICIAN ASSISTANT

## 2022-04-28 PROCEDURE — 85025 COMPLETE CBC W/AUTO DIFF WBC: CPT | Performed by: PHYSICIAN ASSISTANT

## 2022-04-28 PROCEDURE — 90750 HZV VACC RECOMBINANT IM: CPT | Mod: S$GLB,,, | Performed by: PHYSICIAN ASSISTANT

## 2022-04-28 PROCEDURE — 3072F LOW RISK FOR RETINOPATHY: CPT | Mod: CPTII,S$GLB,, | Performed by: PHYSICIAN ASSISTANT

## 2022-04-28 PROCEDURE — 3079F PR MOST RECENT DIASTOLIC BLOOD PRESSURE 80-89 MM HG: ICD-10-PCS | Mod: CPTII,S$GLB,, | Performed by: PHYSICIAN ASSISTANT

## 2022-04-28 PROCEDURE — 3074F SYST BP LT 130 MM HG: CPT | Mod: CPTII,S$GLB,, | Performed by: PHYSICIAN ASSISTANT

## 2022-04-28 PROCEDURE — 3074F PR MOST RECENT SYSTOLIC BLOOD PRESSURE < 130 MM HG: ICD-10-PCS | Mod: CPTII,S$GLB,, | Performed by: PHYSICIAN ASSISTANT

## 2022-04-28 PROCEDURE — 36415 COLL VENOUS BLD VENIPUNCTURE: CPT | Performed by: PHYSICIAN ASSISTANT

## 2022-04-28 PROCEDURE — 99214 OFFICE O/P EST MOD 30 MIN: CPT | Mod: 25,S$GLB,, | Performed by: PHYSICIAN ASSISTANT

## 2022-04-28 PROCEDURE — 80053 COMPREHEN METABOLIC PANEL: CPT | Performed by: PHYSICIAN ASSISTANT

## 2022-04-28 PROCEDURE — 90471 IMMUNIZATION ADMIN: CPT | Mod: S$GLB,,, | Performed by: PHYSICIAN ASSISTANT

## 2022-04-28 PROCEDURE — 99999 PR PBB SHADOW E&M-EST. PATIENT-LVL V: ICD-10-PCS | Mod: PBBFAC,,, | Performed by: PHYSICIAN ASSISTANT

## 2022-04-28 PROCEDURE — 3079F DIAST BP 80-89 MM HG: CPT | Mod: CPTII,S$GLB,, | Performed by: PHYSICIAN ASSISTANT

## 2022-04-28 PROCEDURE — 90471 ZOSTER RECOMBINANT VACCINE: ICD-10-PCS | Mod: S$GLB,,, | Performed by: PHYSICIAN ASSISTANT

## 2022-04-28 PROCEDURE — 90750 ZOSTER RECOMBINANT VACCINE: ICD-10-PCS | Mod: S$GLB,,, | Performed by: PHYSICIAN ASSISTANT

## 2022-04-28 PROCEDURE — 1159F PR MEDICATION LIST DOCUMENTED IN MEDICAL RECORD: ICD-10-PCS | Mod: CPTII,S$GLB,, | Performed by: PHYSICIAN ASSISTANT

## 2022-04-28 PROCEDURE — 3072F PR LOW RISK FOR RETINOPATHY: ICD-10-PCS | Mod: CPTII,S$GLB,, | Performed by: PHYSICIAN ASSISTANT

## 2022-04-28 RX ORDER — TIZANIDINE 4 MG/1
4 TABLET ORAL EVERY 8 HOURS PRN
Qty: 20 TABLET | Refills: 0 | Status: SHIPPED | OUTPATIENT
Start: 2022-04-28 | End: 2022-05-19 | Stop reason: SDUPTHER

## 2022-04-28 RX ORDER — DICLOFENAC SODIUM 10 MG/G
2 GEL TOPICAL 4 TIMES DAILY
Qty: 100 G | Refills: 0 | Status: SHIPPED | OUTPATIENT
Start: 2022-04-28 | End: 2023-06-05

## 2022-04-28 NOTE — TELEPHONE ENCOUNTER
----- Message from Zaria Tobin PA-C sent at 4/28/2022 12:39 PM CDT -----    ----- Message -----  From: Lennei Gardiner  Sent: 4/28/2022  12:30 PM CDT  To: Crista TEJADA Staff    Pt stated she received a message to change appt to 7:40 instead of 7:00 4/29/2022. She said 7:40 is a good time but she would like the nurse to call her back to confirm. Call back number is [phone]. Thx.

## 2022-04-29 ENCOUNTER — OFFICE VISIT (OUTPATIENT)
Dept: PAIN MEDICINE | Facility: CLINIC | Age: 57
End: 2022-04-29
Payer: COMMERCIAL

## 2022-04-29 DIAGNOSIS — M46.1 SACROILIITIS: Primary | ICD-10-CM

## 2022-04-29 DIAGNOSIS — E11.9 TYPE 2 DIABETES MELLITUS WITHOUT COMPLICATION, WITHOUT LONG-TERM CURRENT USE OF INSULIN: ICD-10-CM

## 2022-04-29 DIAGNOSIS — R20.2 NUMBNESS AND TINGLING OF FOOT: Primary | ICD-10-CM

## 2022-04-29 DIAGNOSIS — R20.0 NUMBNESS AND TINGLING OF FOOT: Primary | ICD-10-CM

## 2022-04-29 DIAGNOSIS — R20.0 NUMBNESS AND TINGLING OF FOOT: ICD-10-CM

## 2022-04-29 DIAGNOSIS — R20.2 NUMBNESS AND TINGLING OF FOOT: ICD-10-CM

## 2022-04-29 DIAGNOSIS — M53.3 SACROILIAC JOINT PAIN: ICD-10-CM

## 2022-04-29 LAB
ALBUMIN SERPL BCP-MCNC: 3.6 G/DL (ref 3.5–5.2)
ALP SERPL-CCNC: 92 U/L (ref 55–135)
ALT SERPL W/O P-5'-P-CCNC: 15 U/L (ref 10–44)
ANION GAP SERPL CALC-SCNC: 9 MMOL/L (ref 8–16)
AST SERPL-CCNC: 16 U/L (ref 10–40)
BILIRUB SERPL-MCNC: 0.5 MG/DL (ref 0.1–1)
BUN SERPL-MCNC: 11 MG/DL (ref 6–20)
CALCIUM SERPL-MCNC: 9.7 MG/DL (ref 8.7–10.5)
CHLORIDE SERPL-SCNC: 108 MMOL/L (ref 95–110)
CHOLEST SERPL-MCNC: 383 MG/DL (ref 120–199)
CHOLEST/HDLC SERPL: 8 {RATIO} (ref 2–5)
CO2 SERPL-SCNC: 24 MMOL/L (ref 23–29)
CREAT SERPL-MCNC: 0.9 MG/DL (ref 0.5–1.4)
EST. GFR  (AFRICAN AMERICAN): >60 ML/MIN/1.73 M^2
EST. GFR  (NON AFRICAN AMERICAN): >60 ML/MIN/1.73 M^2
ESTIMATED AVG GLUCOSE: 123 MG/DL (ref 68–131)
GLUCOSE SERPL-MCNC: 89 MG/DL (ref 70–110)
HBA1C MFR BLD: 5.9 % (ref 4–5.6)
HDLC SERPL-MCNC: 48 MG/DL (ref 40–75)
HDLC SERPL: 12.5 % (ref 20–50)
LDLC SERPL CALC-MCNC: 298 MG/DL (ref 63–159)
NONHDLC SERPL-MCNC: 335 MG/DL
POTASSIUM SERPL-SCNC: 4 MMOL/L (ref 3.5–5.1)
PROT SERPL-MCNC: 7.1 G/DL (ref 6–8.4)
SODIUM SERPL-SCNC: 141 MMOL/L (ref 136–145)
TRIGL SERPL-MCNC: 185 MG/DL (ref 30–150)

## 2022-04-29 PROCEDURE — 1160F PR REVIEW ALL MEDS BY PRESCRIBER/CLIN PHARMACIST DOCUMENTED: ICD-10-PCS | Mod: CPTII,95,, | Performed by: PHYSICIAN ASSISTANT

## 2022-04-29 PROCEDURE — 1160F RVW MEDS BY RX/DR IN RCRD: CPT | Mod: CPTII,95,, | Performed by: PHYSICIAN ASSISTANT

## 2022-04-29 PROCEDURE — 99214 OFFICE O/P EST MOD 30 MIN: CPT | Mod: 95,,, | Performed by: PHYSICIAN ASSISTANT

## 2022-04-29 PROCEDURE — 3072F LOW RISK FOR RETINOPATHY: CPT | Mod: CPTII,95,, | Performed by: PHYSICIAN ASSISTANT

## 2022-04-29 PROCEDURE — 99214 PR OFFICE/OUTPT VISIT, EST, LEVL IV, 30-39 MIN: ICD-10-PCS | Mod: 95,,, | Performed by: PHYSICIAN ASSISTANT

## 2022-04-29 PROCEDURE — 1159F PR MEDICATION LIST DOCUMENTED IN MEDICAL RECORD: ICD-10-PCS | Mod: CPTII,95,, | Performed by: PHYSICIAN ASSISTANT

## 2022-04-29 PROCEDURE — 1159F MED LIST DOCD IN RCRD: CPT | Mod: CPTII,95,, | Performed by: PHYSICIAN ASSISTANT

## 2022-04-29 PROCEDURE — 3072F PR LOW RISK FOR RETINOPATHY: ICD-10-PCS | Mod: CPTII,95,, | Performed by: PHYSICIAN ASSISTANT

## 2022-04-29 RX ORDER — PREGABALIN 75 MG/1
75 CAPSULE ORAL 2 TIMES DAILY
Qty: 60 CAPSULE | Refills: 1 | Status: SHIPPED | OUTPATIENT
Start: 2022-04-29 | End: 2022-07-20 | Stop reason: SDUPTHER

## 2022-04-29 NOTE — PATIENT INSTRUCTIONS
__________________________________________________________________________________________________________________________________________________________________________________________

## 2022-04-29 NOTE — PROGRESS NOTES
The patient location is: home  The chief complaint leading to consultation is: chronic pain     Visit type: audiovisual    Face to Face time with patient: 10-15 minutes  20 minutes of total time spent on the encounter, which includes face to face time and non-face to face time preparing to see the patient (eg, review of tests), Obtaining and/or reviewing separately obtained history, Documenting clinical information in the electronic or other health record, Independently interpreting results (not separately reported) and communicating results to the patient/family/caregiver, or Care coordination (not separately reported).     Each patient to whom he or she provides medical services by telemedicine is:  (1) informed of the relationship between the physician and patient and the respective role of any other health care provider with respect to management of the patient; and (2) notified that he or she may decline to receive medical services by telemedicine and may withdraw from such care at any time.        Chief Pain Complaint:  Low back pain into buttocks  Bilateral foot numbness    Interval History (4/29/2022):  Leydi Quintero presents today for follow-up visit.  Patient was last seen almost 2 years ago. At that visit, the plan was to get EMG of the legs to evaluate numbness in the feet, which she canceled due to COVID at the time.  She feels the pain worsens with activity.  Pain is intermittent, comes and goes and is never constant.  She has done physical therapy in the past with relief, but the pain returns shortly after she completes the sessions.  She could not tolerate gabapentin in the past.  Lyrica was not approved by insurance.  She was recently given Zanaflex from her primary care, which she took last night, and she got a good night's rest for the 1st time in a while.  She was also given Voltaren gel, which was not yet approved by insurance.    History of Present Illness (6/18/20):   Ms. Quintero  returns to clinic for follow-up.  She has obtained her lumbar MRI which we will review today.  She continues have pain located primarily low back in the posterior lower legs.  She rates her pain currently as a 4/10.  She continues to have pain located in the axial lumbar spine in addition to the bilateral lower extremities.  MRI shows minor facet arthropathy L3/4-L4/5 in addition to minimal left L4/5 neural foraminal stenosis.  She has started on gabapentin however this was causing excessive drowsiness in addition to an increase in appetite therefore she has been switched to Lyrica 50 mg capsules twice daily which she has found to be much more helpful.  She continues to have pain in her lower legs in a nondermatomal distribution but reports that radiates from her low back distally into her feet.  She reports the right leg is worse than the left leg currently.  She completed physical therapy of greater than 6 weeks in February of 2020 and continues to perform therapy exercises at home as tolerated.    Initial HPI (6/4/20):  This patient is a 54 y.o. female who presents today complaining of the above noted pain/s. The patient describes the pain as follows.  Ms. Quintero is new patient clinic with complaints of low back and leg pain for approximately one year.  There was no inciting event and currently rates her pain as 8/10.  She describes mostly a sharp, aching, with numbness and tingling in her legs bilaterally.  Her symptoms worse with walking and standing for long periods of time her somewhat improved with rest and propping her feet up.  She has not really tried anything with medications however she has been physical therapy which he completed 4 months ago greater than 6 weeks physical therapy with some benefit.  She continues to walk for exercise at home in addition to performing physical therapy exercises.  She has tried Tylenol over-the-counter with minimal symptomatic pain relief.  She finds that her right leg  seems to be worse in the left leg and she endorses having numbness, weakness, tingling in bilateral lower extremities.  She does wear compression socks which did provide some benefit however these of loss her effectiveness.  She denies having had surgery injections in her lumbar spine.  She finds that heat and massage do provide some benefit.    Previous Therapy:  Medications:  Tylenol, gabapentin, Lyrica  Injections: None  Surgeries: No spinal surgery   Physical Therapy: Completed in the Past, completed greater than 6 weeks of physical therapy approximately 4 months ago       Imaging / Labs / Studies (reviewed on 4/29/2022):    06/16/2020 MRI Lumbar Spine Without Contrast  COMPARISON:  None    FINDINGS:  The distal cord and conus reveal normal signal and morphology. The lumbar vertebra reveal normal alignment, shape and signal intensity.    T12-L1: Unremarkable.    L1-2:  Unremarkable.  Incidental demonstration of a 3 cm right renal cysts.    L2-3:  Unremarkable.    L3-4:  Minor facet hypertrophy.    L4-5:  Mild disc desiccation with annular bulge.  Left foraminal annular fissure.  Minor left foraminal stenosis.  Minor facet hypertrophy.    L5-S1:  Unremarkable.    Impression  Mild L4-5 degenerative disc disease with annular bulge and left foraminal annular fissure.  Minor left foraminal stenosis.  Minor multilevel facet arthrosis.        Review of Systems:  Constitutional: Negative for fever.   Eyes: Negative for blurred vision.   Respiratory: Negative for cough and wheezing.    Cardiovascular: Negative for chest pain and orthopnea.   Gastrointestinal: Negative for constipation, diarrhea, nausea and vomiting.   Genitourinary: Negative for dysuria.   Musculoskeletal: Positive for back pain.        Lumbar radiculopathy   Skin: Negative for itching and rash.   Neurological: Positive for tingling and weakness.   Endo/Heme/Allergies: Does not bruise/bleed easily.       Physical Exam:  Telemedicine Exam  There were no  vitals filed for this visit.  There is no height or weight on file to calculate BMI.   (reviewed on 4/29/2022)     GENERAL: Well appearing, in no acute distress, alert and oriented x3.  Cooperative.  PSYCH:  Mood and affect appropriate.  SKIN: Skin color & texture with no obvious abnormalities.    HEAD/FACE:  Normocephalic, atraumatic.    PULM:  No difficulty breathing. No nasal flaring. No obvious wheezing.  BACK: Palpation over the lumbar paraspinous muscles causes pain. Some pain with flexion. Some pain with extension.  Patient performed Facet loading causes some pain. Patient performed Jeferson's/ Kailash's positive bilaterally. TTP over SI joint: Present bilaterally.  EXTREMITIES: No obvious deformities. Moving all extremities well, appears to have symmetric strength throughout.  MUSCULOSKELETAL: No obvious atrophy abnormalities are noted.   NEURO: No obvious neurologic deficit.   GAIT: sitting.     Physical Exam: last in clinic visit:  General  Constitutional: She is oriented to person, place, and time. She appears well-developed and well-nourished.   HENT:   Head: Normocephalic and atraumatic.   Eyes: EOM are normal.   Neck: Neck supple.   Pulmonary/Chest: Effort normal.   Abdominal: She exhibits no distension.   Neurological: She is alert and oriented to person, place, and time. No cranial nerve deficit.   Psychiatric: She has a normal mood and affect.     General Musculoskeletal Exam   Gait: normal     Back (L-Spine & T-Spine) / Neck (C-Spine) Exam     Back (L-Spine & T-Spine) Range of Motion   Extension: normal   Flexion: normal   Lateral bend right: normal   Lateral bend left: normal     Spinal Sensation   Right Side Sensation  L-Spine Level: normal  Left Side Sensation  L-Spine Level: normal    Comments:  Sensation intact to light touch bilateral lower extremities; positive straight leg raise for radicular symptoms bilaterally; increased pain lumbar extension and flexion    Muscle Strength   Right Lower  Extremity   Hip Flexion: 5/5   Quadriceps:  5/5   Hamstrin/5   EHL:  5/5  Left Lower Extremity   Hip Flexion: 5/5   Quadriceps:  5/5   Hamstrin/5   EHL:  5/5    Reflexes   Left Side  Achilles:  2+  Ankle Clonus:  absent  Quadriceps:  2+  Right Side   Achilles:  2+  Ankle Clonus:  absent  Quadriceps:  2+      Assessment  1. 56 y.o. year old patient with PMH of   Past Medical History:   Diagnosis Date    Allergic rhinitis     Anxiety     Asthma     Diabetes mellitus, type 2     Hyperlipidemia     Hypertension     Personal history of colonic polyps 2014    Sarcoidosis       presenting with pain located lumbar spine, bilateral lower extremities.   No diagnosis found.   2. Pain Generators / Etiology: Lumbar Radiculopathy and Lumbar Spondylosis  3. Failed Meds (E- Effective, NE- Not Effective):  Tylenol-minimally effective  4. Physical Therapy - Completed in the Past greater than 6 weeks physical therapy approximately 4 months ago  5. Psychological comorbidities - None  6. Anticoagulants / Antiplatelets: None     PLAN:  1. Interventional:   - Schedule bilateral SIJ injection.   - Previously scheduled for Bilateral L3-5 MBB, which was not completed.     2. Pharmacologic:   - Start Lyrica 75mg BID to help with numbness in the feet.  Consider further Increase if no improvement after 3 weeks.  Patient informed not to stop taking if she does not find significant pain relief.  Has not tolerated gabapentin in the past.  - Continue Zanaflex 4 mg QHS PRN, which helped her get some rest last night.    - Will complete prior Auth for Voltaren 1% gel-recently prescribed by PCP.  - Anticoagulation use: None.     3. Rehabilitative: Encouraged regular exercise. She previously completed greater than 6 weeks physical therapy in the past with minimal symptomatic relief; she continues to walk for exercise and performs physical therapy exercises at home on a daily basis.     4. Diagnostic: BLE EMG/NCS ordered at last  visit - not completed.  Will reorder to evaluate foot numbness, which does not appear to be from lumbar radicular sources.  Has a history of diabetes.    5. Follow up: 4 weeks post-injection - will send online ticket scheduling on [employer]     - This condition does not require this patient to take time off of work, and the primary goal of our Pain Management services is to improve the patient's functional capacity.   - I discussed the risks, benefits, and alternatives to potential treatment options. All questions and concerns were fully addressed today in clinic.           Disclaimer:  This note was prepared using voice recognition system and is likely to have sound alike errors that may have been overlooked even after proof reading.  Please call me with any questions.

## 2022-04-29 NOTE — PROGRESS NOTES
Health Maintenance Due   Topic Date Due    Colorectal Cancer Screening  07/24/2019    TETANUS VACCINE  05/12/2021    Diabetes Urine Screening  02/03/2022    Mammogram  02/03/2022    Lipid Panel  02/03/2022    Hemoglobin A1c  02/16/2022    COVID-19 Vaccine (4 - Booster for Moderna series) 03/09/2022     Updates were requested from care everywhere.  Chart was reviewed for overdue Proactive [facility] Encounters (DONAL) topics (CRS, Breast Cancer Screening, Eye exam)  Health Maintenance has been updated.  LINKS immunization registry triggered.  Immunizations were reconciled.

## 2022-05-13 ENCOUNTER — TELEPHONE (OUTPATIENT)
Dept: PREADMISSION TESTING | Facility: HOSPITAL | Age: 57
End: 2022-05-13
Payer: COMMERCIAL

## 2022-05-13 ENCOUNTER — HOSPITAL ENCOUNTER (OUTPATIENT)
Dept: PREADMISSION TESTING | Facility: HOSPITAL | Age: 57
Discharge: HOME | End: 2022-05-13
Attending: INTERNAL MEDICINE
Payer: COMMERCIAL

## 2022-05-13 ENCOUNTER — OFFICE VISIT (OUTPATIENT)
Dept: INTERNAL MEDICINE | Facility: CLINIC | Age: 57
End: 2022-05-13
Payer: COMMERCIAL

## 2022-05-13 VITALS
HEART RATE: 83 BPM | OXYGEN SATURATION: 96 % | WEIGHT: 233.44 LBS | DIASTOLIC BLOOD PRESSURE: 80 MMHG | SYSTOLIC BLOOD PRESSURE: 126 MMHG | TEMPERATURE: 99 F | HEIGHT: 65 IN | RESPIRATION RATE: 18 BRPM | BODY MASS INDEX: 38.89 KG/M2

## 2022-05-13 DIAGNOSIS — F41.9 ANXIETY AND DEPRESSION: ICD-10-CM

## 2022-05-13 DIAGNOSIS — Z12.11 SCREEN FOR COLON CANCER: Primary | ICD-10-CM

## 2022-05-13 DIAGNOSIS — E78.2 MIXED HYPERLIPIDEMIA: Primary | ICD-10-CM

## 2022-05-13 DIAGNOSIS — E66.01 SEVERE OBESITY (BMI 35.0-39.9) WITH COMORBIDITY: ICD-10-CM

## 2022-05-13 DIAGNOSIS — I10 HYPERTENSION GOAL BP (BLOOD PRESSURE) < 130/80: ICD-10-CM

## 2022-05-13 DIAGNOSIS — E11.9 CONTROLLED TYPE 2 DIABETES MELLITUS WITHOUT COMPLICATION, WITHOUT LONG-TERM CURRENT USE OF INSULIN: ICD-10-CM

## 2022-05-13 DIAGNOSIS — Z12.11 COLON CANCER SCREENING: ICD-10-CM

## 2022-05-13 DIAGNOSIS — F32.A ANXIETY AND DEPRESSION: ICD-10-CM

## 2022-05-13 PROCEDURE — 3072F PR LOW RISK FOR RETINOPATHY: ICD-10-PCS | Mod: CPTII,S$GLB,, | Performed by: INTERNAL MEDICINE

## 2022-05-13 PROCEDURE — 3079F DIAST BP 80-89 MM HG: CPT | Mod: CPTII,S$GLB,, | Performed by: INTERNAL MEDICINE

## 2022-05-13 PROCEDURE — 3074F SYST BP LT 130 MM HG: CPT | Mod: CPTII,S$GLB,, | Performed by: INTERNAL MEDICINE

## 2022-05-13 PROCEDURE — 3066F NEPHROPATHY DOC TX: CPT | Mod: CPTII,S$GLB,, | Performed by: INTERNAL MEDICINE

## 2022-05-13 PROCEDURE — 3066F PR DOCUMENTATION OF TREATMENT FOR NEPHROPATHY: ICD-10-PCS | Mod: CPTII,S$GLB,, | Performed by: INTERNAL MEDICINE

## 2022-05-13 PROCEDURE — 99214 PR OFFICE/OUTPT VISIT, EST, LEVL IV, 30-39 MIN: ICD-10-PCS | Mod: S$GLB,,, | Performed by: INTERNAL MEDICINE

## 2022-05-13 PROCEDURE — 99999 PR PBB SHADOW E&M-EST. PATIENT-LVL V: CPT | Mod: PBBFAC,,, | Performed by: INTERNAL MEDICINE

## 2022-05-13 PROCEDURE — 1160F RVW MEDS BY RX/DR IN RCRD: CPT | Mod: CPTII,S$GLB,, | Performed by: INTERNAL MEDICINE

## 2022-05-13 PROCEDURE — 3061F PR NEG MICROALBUMINURIA RESULT DOCUMENTED/REVIEW: ICD-10-PCS | Mod: CPTII,S$GLB,, | Performed by: INTERNAL MEDICINE

## 2022-05-13 PROCEDURE — 3079F PR MOST RECENT DIASTOLIC BLOOD PRESSURE 80-89 MM HG: ICD-10-PCS | Mod: CPTII,S$GLB,, | Performed by: INTERNAL MEDICINE

## 2022-05-13 PROCEDURE — 3074F PR MOST RECENT SYSTOLIC BLOOD PRESSURE < 130 MM HG: ICD-10-PCS | Mod: CPTII,S$GLB,, | Performed by: INTERNAL MEDICINE

## 2022-05-13 PROCEDURE — 99214 OFFICE O/P EST MOD 30 MIN: CPT | Mod: S$GLB,,, | Performed by: INTERNAL MEDICINE

## 2022-05-13 PROCEDURE — 3044F PR MOST RECENT HEMOGLOBIN A1C LEVEL <7.0%: ICD-10-PCS | Mod: CPTII,S$GLB,, | Performed by: INTERNAL MEDICINE

## 2022-05-13 PROCEDURE — 1160F PR REVIEW ALL MEDS BY PRESCRIBER/CLIN PHARMACIST DOCUMENTED: ICD-10-PCS | Mod: CPTII,S$GLB,, | Performed by: INTERNAL MEDICINE

## 2022-05-13 PROCEDURE — 1159F PR MEDICATION LIST DOCUMENTED IN MEDICAL RECORD: ICD-10-PCS | Mod: CPTII,S$GLB,, | Performed by: INTERNAL MEDICINE

## 2022-05-13 PROCEDURE — 3072F LOW RISK FOR RETINOPATHY: CPT | Mod: CPTII,S$GLB,, | Performed by: INTERNAL MEDICINE

## 2022-05-13 PROCEDURE — 99999 PR PBB SHADOW E&M-EST. PATIENT-LVL V: ICD-10-PCS | Mod: PBBFAC,,, | Performed by: INTERNAL MEDICINE

## 2022-05-13 PROCEDURE — 1159F MED LIST DOCD IN RCRD: CPT | Mod: CPTII,S$GLB,, | Performed by: INTERNAL MEDICINE

## 2022-05-13 PROCEDURE — 3044F HG A1C LEVEL LT 7.0%: CPT | Mod: CPTII,S$GLB,, | Performed by: INTERNAL MEDICINE

## 2022-05-13 PROCEDURE — 3008F PR BODY MASS INDEX (BMI) DOCUMENTED: ICD-10-PCS | Mod: CPTII,S$GLB,, | Performed by: INTERNAL MEDICINE

## 2022-05-13 PROCEDURE — 3008F BODY MASS INDEX DOCD: CPT | Mod: CPTII,S$GLB,, | Performed by: INTERNAL MEDICINE

## 2022-05-13 PROCEDURE — 3061F NEG MICROALBUMINURIA REV: CPT | Mod: CPTII,S$GLB,, | Performed by: INTERNAL MEDICINE

## 2022-05-13 NOTE — TELEPHONE ENCOUNTER
PAT call completed.  Patient educated on procedure instructions.  Medical history discussed and patient informed of arrival time of 7:00 AM on Wednesday, June 29, 2022 at [facility], check in on the 1st floor, and was made aware of the limited-visitor policy, and that  is to remain during the entire visit.  All questions and concerns addressed.  Endoscopy instructions reviewed. Instructed no solid food, only clear liquids, the day before the procedure, then at 6 PM, the day before the procedure, drink the first half of the bowel prep, then at 2 AM, the morning of procedure, drink the second half of the prep, then do not eat or drink anything until after the procedure.  Also instructed to only take blood pressure medication Amlodipine the morning of procedure with just a few small sips of water, hold diabetic medications, and to monitor blood sugar closely.  Pre-procedure covid testing not needed, patient is covid vaccinated. Patient verbalized understanding of all instructions.

## 2022-05-13 NOTE — PROGRESS NOTES
Date of Service: 02/18/2022    PREOPERATIVE DIAGNOSIS:   Painful bunion deformity, left extensor tendinitis, left big toe joint.    POSTOPERATIVE DIAGNOSIS:   Painful bunion deformity, left extensor tendinitis, left big toe joint.    PROCEDURES:   1.  Bunionectomy, Akin osteotomy with the use of 2.5 screw, left, with local IV sedation.  2.  Midfoot capsulotomy, left.  3.  Extensor tenolysis, left.    SURGEON:   Tarun Rushing DPM.    ASSISTANT:  None.    ANESTHETIC USED:  20 mL mixed 2% lidocaine plain and 0.5% Marcaine plain infiltrated block of the left foot.    HEMOSTASIS USED:   Pneumatic ankle tourniquet set at 250 mmHg, left.    BLOOD LOSS:   Minimal.    COMPLICATIONS:   None.    SPECIMENS:   None.    DICTATION OF OPERATION:   The patient was brought to the OR, placed on the surgical table in supine position.  The above-mentioned local anesthetic was administered to the left foot and the left foot was then prepped and draped in the usual sterile manner.  Attention was then directed to the left extremity where Esmarch was placed around the extremity for exsanguination.  Pneumatic ankle tourniquet, left, was inflated to 250 mmHg.  Attention was directed to the dorsal aspect of the first metatarsal joint where a 3.0 dorsolinear incision was performed.  This was then deepened by blunt and sharp dissection.  All bleeders were identified, cauterized or ligated.  Dissection was carried out to the joint capsule and a dorsal capsulotomy incision was then performed.  This was then sharply reflected to expose the head of the first metatarsal and hallux itself noticing bony spurring throughout the dorsal and medial aspect and lateral aspect of the joint.  These were identified and removed using power equipment. A lateral release was performed at the joint level to facilitate a more rectus hallux position which was slightly improved.  There was still somewhat of a high intermetatarsal angle.  We decided to do a  Leydi Victoria [place]  56 y.o. Black or  female  [id number]    Chief Complaint:  Chief Complaint   Patient presents with    Follow-up       History of Present Illness:  HLD--worsened. She admits to non compliance with pravastatin but is back on it now.   Lab Results   Component Value Date    LDLCALC 298.0 (H) 04/28/2022     HTN--stable on amlodipine.   DM--stable on Trulicity.     Laboratory:  Lab Results   Component Value Date    WBC 6.90 04/28/2022    HGB 12.5 04/28/2022    HCT 40.9 04/28/2022     04/28/2022    CHOL 383 (H) 04/28/2022    TRIG 185 (H) 04/28/2022    HDL 48 04/28/2022    ALT 15 04/28/2022    AST 16 04/28/2022     04/28/2022    K 4.0 04/28/2022     04/28/2022    CREATININE 0.9 04/28/2022    BUN 11 04/28/2022    CO2 24 04/28/2022    TSH 1.493 04/04/2018    HGBA1C 5.9 (H) 04/28/2022     Anxiety/depression--stable on duloxetine.     History:  Past Medical History:   Diagnosis Date    Allergic rhinitis     Anxiety     Asthma     Diabetes mellitus, type 2     Hyperlipidemia     Hypertension     Personal history of colonic polyps 07/24/2014    Sarcoidosis        Medications:  Current Outpatient Medications on File Prior to Visit   Medication Sig Dispense Refill    albuterol-ipratropium (DUO-NEB) 2.5 mg-0.5 mg/3 mL nebulizer solution Take 3 mLs by nebulization every 6 (six) hours as needed for Wheezing. Rescue 1 Box 3    amLODIPine (NORVASC) 5 MG tablet TAKE 1 TABLET(5 MG) BY MOUTH EVERY DAY 90 tablet 1    blood sugar diagnostic Strp Once daily glucose testing. 50 strip 6    DULoxetine (CYMBALTA) 60 MG capsule TAKE 1 CAPSULE(60 MG) BY MOUTH EVERY DAY 90 capsule 1    fluticasone propionate (FLONASE) 50 mcg/actuation nasal spray 2 sprays (100 mcg total) by Each Nostril route once daily. 9.9 mL 0    lancets (LANCETS,THIN) Misc Once daily glucose testing. 50 each 6    meclizine (ANTIVERT) 25 mg tablet Take 1 tablet (25 mg total) by mouth 3 (three) times daily as  midfoot capsulotomy in first metatarsal cuneiform joint area.  The incision was extended proximal and a medial capsulotomy of first metatarsal cuneiform joint was performed medially.  Care was taken to avoid any neurovascular structures.  This helped to reduce the IM angle.  At this point, due to loading the forefoot, there was still lateral deviation at the hallux level due to the high interphalangeal angle so I did a wedge osteotomy base medial, apex lateral.  Approximately 1 mm wedge was removed and this was then closed and fixated with a 2.5 screw from medial, proximal to lateral distal direction in strict AO fashion.  Once this was performed, the osteotomy was stabilized.  There was still some constriction at the extensor tendon with range of motion. There was still lateral deviation. I decided to do a extensor tenolysis by blunt dissection of the extensor tendon and surrounding tissue that were contracted.  Once performed, the toe on a more rectus flexible position with good range of motion.  No other abnormalities noted.  The area was copiously flushed with normal saline and the capsular tissue was reapproximated with 3-0 Vicryl.  Skin was reapproximated using 5-0 Vicryl and Steri-Strips.  Double antibiotic ointment, Adaptic, 4 x 4's, and Melissa were used as a compressive dressing around the left foot.  The tourniquet was released.  All normal color returned to the digits instantaneously.  Cap refill time was 2 seconds.  The patient tolerated the surgical procedure and anesthetic well without complication. Postoperative instructions were given.  Follow up in outpatient in 1 week.      Dictated By: Tarun Rushing DPM  Signing Provider: STEWART Lamar/kim ([id number])   DD: 02/18/2022 10:35:54 AM TD: 02/18/2022 11:05:31 AM      Copy Sent To:  Tarun Rushing DPM   needed for Dizziness. 30 tablet 0    montelukast (SINGULAIR) 10 mg tablet TAKE 1 TABLET(10 MG) BY MOUTH EVERY EVENING 90 tablet 1    polymyxin B sulf-trimethoprim (POLYTRIM) 10,000 unit- 1 mg/mL Drop Place 1 drop into both eyes every 6 (six) hours. 10 mL 0    potassium chloride SA (K-DUR,KLOR-CON) 10 MEQ tablet TAKE 1 TABLET BY MOUTH EVERY DAY 90 tablet 0    pravastatin (PRAVACHOL) 80 MG tablet TAKE 1 TABLET(80 MG) BY MOUTH EVERY EVENING 90 tablet 1    pregabalin (LYRICA) 75 MG capsule Take 1 capsule (75 mg total) by mouth 2 (two) times daily. 60 capsule 1    TRUE METRIX AIR GLUCOSE METER [facility] [place] DIRECTED TO TEST BLOOD SUGAR ONCE DAILY 1 each 0    TRULICITY 3 mg/0.5 mL pen injector INJECT 3MG INTO THE SKIN EVERY 7 DAYS 12 pen 1    diclofenac sodium (VOLTAREN) 1 % Gel Apply 2 g topically 4 (four) times daily. for 10 days 100 g 0    ipratropium (ATROVENT) 0.02 % nebulizer solution Take 2.5 mLs (500 mcg total) by nebulization once. Rescue for 1 dose 1 vial 0     No current facility-administered medications on file prior to visit.       Allergies:  Review of patient's allergies indicates:   Allergen Reactions    Lipitor [atorvastatin]      Other reaction(s): Muscle pain       Review of Systems   Constitutional: Negative for fever and weight loss.   Respiratory: Negative for shortness of breath.    Cardiovascular: Negative for chest pain and leg swelling.   Neurological: Negative for dizziness and headaches.   Psychiatric/Behavioral: Positive for depression. The patient is nervous/anxious.        Exam:  Vitals:    05/13/22 0822   BP: 126/80   Pulse: 83   Resp: 18   Temp: 98.6 °F (37 °C)     Weight: 105.9 kg (233 lb 7.5 oz)   Body mass index is 38.85 kg/m².      Physical Exam  Vitals reviewed.   Constitutional:       General: She is not in acute distress.     Appearance: She is well-developed. She is obese. She is not ill-appearing.   Eyes:      General: No scleral icterus.  Cardiovascular:      Rate and  Rhythm: Normal rate and regular rhythm.      Heart sounds: Normal heart sounds.   Pulmonary:      Effort: Pulmonary effort is normal. No respiratory distress.      Breath sounds: Normal breath sounds. No wheezing or rales.   Skin:     General: Skin is warm and dry.   Neurological:      Mental Status: She is alert and oriented to person, place, and time.   Psychiatric:         Behavior: Behavior normal.       Assessment:  The primary encounter diagnosis was Mixed hyperlipidemia. Diagnoses of Hypertension goal BP (blood pressure) < 130/80, Controlled type 2 diabetes mellitus without complication, without long-term current use of insulin, Anxiety and depression, Severe obesity (BMI 35.0-39.9) with comorbidity, and Colon cancer screening were also pertinent to this visit.    Plan:  Mixed hyperlipidemia  -     Counseled regarding medication compliance  -     Lifestyle modifications discussed    Hypertension goal BP (blood pressure) < 130/80  -     Continue amlodipine    Controlled type 2 diabetes mellitus without complication, without long-term current use of insulin  -     Continue Trulicity    Anxiety and depression  -     Continue duloxetine    Severe obesity (BMI 35.0-39.9) with comorbidity  -     Lifestyle modifications discussed    Colon cancer screening  -     Ambulatory referral/consult to Endo Procedure ; Future; Expected date: 05/14/2022    Follow up in about 3 months (around 8/13/2022).

## 2022-05-13 NOTE — PRE-PROCEDURE INSTRUCTIONS
PAT call completed.  Patient educated on procedure instructions.  Medical history discussed and patient informed of arrival time of 7:00 AM on Wednesday, June 29, 2022 at [facility], check in on the 1st floor, and was made aware of the limited-visitor policy, and that  is to remain during the entire visit.  All questions and concerns addressed.  Endoscopy instructions reviewed. Instructed no solid food, only clear liquids, the day before the procedure, then at 6 PM, the day before the procedure, drink the first half of the bowel prep, then at 2 AM, the morning of procedure, drink the second half of the prep, then do not eat or drink anything until after the procedure.  Also instructed to only take blood pressure medication Amlodipine the morning of procedure with just a few small sips of water, hold diabetic medications, and to monitor blood sugar closely.  Pre-procedure covid testing not needed, patient is covid vaccinated. Patient verbalized understanding of all instructions.

## 2022-05-20 ENCOUNTER — HOSPITAL ENCOUNTER (OUTPATIENT)
Dept: RADIOLOGY | Facility: HOSPITAL | Age: 57
Discharge: HOME | End: 2022-05-20
Attending: PHYSICIAN ASSISTANT
Payer: COMMERCIAL

## 2022-05-20 DIAGNOSIS — Z12.31 ENCOUNTER FOR SCREENING MAMMOGRAM FOR MALIGNANT NEOPLASM OF BREAST: ICD-10-CM

## 2022-05-20 PROCEDURE — 77067 MAMMO DIGITAL SCREENING BILAT WITH TOMO: ICD-10-PCS | Mod: 26,,, | Performed by: RADIOLOGY

## 2022-05-20 PROCEDURE — 77063 MAMMO DIGITAL SCREENING BILAT WITH TOMO: ICD-10-PCS | Mod: 26,,, | Performed by: RADIOLOGY

## 2022-05-20 PROCEDURE — 77063 BREAST TOMOSYNTHESIS BI: CPT | Mod: 26,,, | Performed by: RADIOLOGY

## 2022-05-20 PROCEDURE — 77067 SCR MAMMO BI INCL CAD: CPT | Mod: TC

## 2022-05-20 PROCEDURE — 77067 SCR MAMMO BI INCL CAD: CPT | Mod: 26,,, | Performed by: RADIOLOGY

## 2022-05-20 PROCEDURE — 77063 BREAST TOMOSYNTHESIS BI: CPT | Mod: TC

## 2022-05-23 ENCOUNTER — TELEPHONE (OUTPATIENT)
Dept: PAIN MEDICINE | Facility: CLINIC | Age: 57
End: 2022-05-23
Payer: COMMERCIAL

## 2022-05-23 NOTE — TELEPHONE ENCOUNTER
Procedure scheduled for 6/16/22 with Dr. Cameron and injection follow up scheduled for 7/20/22 @ 9:20.

## 2022-05-30 ENCOUNTER — HOSPITAL ENCOUNTER (OUTPATIENT)
Dept: PREADMISSION TESTING | Facility: HOSPITAL | Age: 57
Discharge: HOME | End: 2022-05-30
Attending: INTERNAL MEDICINE
Payer: COMMERCIAL

## 2022-05-30 DIAGNOSIS — Z12.11 COLON CANCER SCREENING: ICD-10-CM

## 2022-06-07 NOTE — PRE-PROCEDURE INSTRUCTIONS
Spoke with patient regarding procedure scheduled on 6.16     Arrival time 0600     Has patient been sick with fever or on antibiotics within the last 7 days? No     Does the patient have any open wounds, sores or rashes? no     Does the patient have any recent fractures? no     Has patient received a vaccination within the last 7 days? No     Received the COVID vaccination?      Has the patient stopped all medications as directed? na     Does patient have a pacemaker and or defibrillator? no     Does the patient have a ride to and from procedure and someone reliable to remain with patient? emely     Is the patient diabetic? yes     Does the patient have sleep apnea? Or use O2 at home? no     Is the patient receiving sedation? yes     Is the patient instructed to remain NPO beginning at midnight the night before their procedure? yes     Procedure location confirmed with patient? Yes     Covid- Denies signs/symptoms. Instructed to notify PAT/MD if any changes.

## 2022-06-16 ENCOUNTER — HOSPITAL ENCOUNTER (OUTPATIENT)
Facility: HOSPITAL | Age: 57
Discharge: HOME | End: 2022-06-16
Attending: PHYSICAL MEDICINE & REHABILITATION | Admitting: PHYSICAL MEDICINE & REHABILITATION
Payer: COMMERCIAL

## 2022-06-16 VITALS
DIASTOLIC BLOOD PRESSURE: 88 MMHG | RESPIRATION RATE: 18 BRPM | HEART RATE: 82 BPM | SYSTOLIC BLOOD PRESSURE: 170 MMHG | WEIGHT: 230.25 LBS | OXYGEN SATURATION: 97 % | TEMPERATURE: 98 F | BODY MASS INDEX: 38.36 KG/M2 | HEIGHT: 65 IN

## 2022-06-16 DIAGNOSIS — M46.1 SACROILIITIS: Primary | ICD-10-CM

## 2022-06-16 LAB — POCT GLUCOSE: 128 MG/DL (ref 70–110)

## 2022-06-16 PROCEDURE — 63600175 PHARM REV CODE 636 W HCPCS: Performed by: PHYSICAL MEDICINE & REHABILITATION

## 2022-06-16 PROCEDURE — 27096 INJECT SACROILIAC JOINT: CPT | Mod: 50,,, | Performed by: PHYSICAL MEDICINE & REHABILITATION

## 2022-06-16 PROCEDURE — 25500020 PHARM REV CODE 255: Performed by: PHYSICAL MEDICINE & REHABILITATION

## 2022-06-16 PROCEDURE — 27096 INJECT SACROILIAC JOINT: CPT | Mod: LT

## 2022-06-16 PROCEDURE — A9585 GADOBUTROL INJECTION: HCPCS | Performed by: PHYSICAL MEDICINE & REHABILITATION

## 2022-06-16 PROCEDURE — 25000003 PHARM REV CODE 250: Performed by: PHYSICAL MEDICINE & REHABILITATION

## 2022-06-16 PROCEDURE — 27096 INJECT SACROILIAC JOINT: CPT | Mod: RT | Performed by: PHYSICAL MEDICINE & REHABILITATION

## 2022-06-16 PROCEDURE — 27096 PR INJECTION,SACROILIAC JOINT: ICD-10-PCS | Mod: 50,,, | Performed by: PHYSICAL MEDICINE & REHABILITATION

## 2022-06-16 RX ORDER — INDOMETHACIN 25 MG/1
CAPSULE ORAL
Status: DISCONTINUED | OUTPATIENT
Start: 2022-06-16 | End: 2022-06-16 | Stop reason: HOSPADM

## 2022-06-16 RX ORDER — BUPIVACAINE HYDROCHLORIDE 2.5 MG/ML
INJECTION, SOLUTION EPIDURAL; INFILTRATION; INTRACAUDAL
Status: DISCONTINUED | OUTPATIENT
Start: 2022-06-16 | End: 2022-06-16 | Stop reason: HOSPADM

## 2022-06-16 RX ORDER — METHYLPREDNISOLONE ACETATE 80 MG/ML
INJECTION, SUSPENSION INTRA-ARTICULAR; INTRALESIONAL; INTRAMUSCULAR; SOFT TISSUE
Status: DISCONTINUED | OUTPATIENT
Start: 2022-06-16 | End: 2022-06-16 | Stop reason: HOSPADM

## 2022-06-16 RX ORDER — GADOBUTROL 604.72 MG/ML
INJECTION INTRAVENOUS
Status: DISCONTINUED | OUTPATIENT
Start: 2022-06-16 | End: 2022-06-16 | Stop reason: HOSPADM

## 2022-06-16 RX ORDER — ONDANSETRON 2 MG/ML
4 INJECTION INTRAMUSCULAR; INTRAVENOUS ONCE AS NEEDED
Status: DISCONTINUED | OUTPATIENT
Start: 2022-06-16 | End: 2022-06-16 | Stop reason: HOSPADM

## 2022-06-16 NOTE — DISCHARGE SUMMARY
Discharge Note  Short Stay      SUMMARY     Admit Date: 6/16/2022    Attending Physician: Sebastien Cameron MD        Discharge Physician: Seabstien Cameron MD        Discharge Date: 6/16/2022 7:16 AM    Procedure(s) (LRB):  Bilateral SIJ Injection (Bilateral)    Final Diagnosis: Sacroiliac joint pain [M53.3]    Disposition: Home or self care    Patient Instructions:   Current Discharge Medication List      CONTINUE these medications which have NOT CHANGED    Details   amLODIPine (NORVASC) 5 MG tablet TAKE 1 TABLET(5 MG) BY MOUTH EVERY DAY  Qty: 90 tablet, Refills: 1    Associated Diagnoses: Hypertension goal BP (blood pressure) < 130/80      albuterol-ipratropium (DUO-NEB) 2.5 mg-0.5 mg/3 mL nebulizer solution Take 3 mLs by nebulization every 6 (six) hours as needed for Wheezing. Rescue  Qty: 1 Box, Refills: 3    Associated Diagnoses: Mild intermittent asthma with exacerbation      blood sugar diagnostic Strp Once daily glucose testing.  Qty: 50 strip, Refills: 6    Associated Diagnoses: Controlled type 2 diabetes mellitus without complication, without long-term current use of insulin      diclofenac sodium (VOLTAREN) 1 % Gel Apply 2 g topically 4 (four) times daily. for 10 days  Qty: 100 g, Refills: 0    Associated Diagnoses: Lumbar radiculopathy      DULoxetine (CYMBALTA) 60 MG capsule TAKE 1 CAPSULE(60 MG) BY MOUTH EVERY DAY  Qty: 90 capsule, Refills: 1    Associated Diagnoses: Anxiety and depression      fluticasone propionate (FLONASE) 50 mcg/actuation nasal spray 2 sprays (100 mcg total) by Each Nostril route once daily.  Qty: 9.9 mL, Refills: 0    Associated Diagnoses: Bacterial sinusitis      ipratropium (ATROVENT) 0.02 % nebulizer solution Take 2.5 mLs (500 mcg total) by nebulization once. Rescue for 1 dose  Qty: 1 vial, Refills: 0    Associated Diagnoses: Mild intermittent asthma with exacerbation      lancets (LANCETS,THIN) Misc Once daily glucose testing.  Qty: 50 each, Refills: 6    Associated  Diagnoses: Controlled type 2 diabetes mellitus without complication, without long-term current use of insulin      meclizine (ANTIVERT) 25 mg tablet Take 1 tablet (25 mg total) by mouth 3 (three) times daily as needed for Dizziness.  Qty: 30 tablet, Refills: 0    Associated Diagnoses: Dizziness      montelukast (SINGULAIR) 10 mg tablet TAKE 1 TABLET(10 MG) BY MOUTH EVERY EVENING  Qty: 90 tablet, Refills: 1    Associated Diagnoses: Sinus congestion      polymyxin B sulf-trimethoprim (POLYTRIM) 10,000 unit- 1 mg/mL Drop Place 1 drop into both eyes every 6 (six) hours.  Qty: 10 mL, Refills: 0      potassium chloride SA (K-DUR,KLOR-CON) 10 MEQ tablet TAKE 1 TABLET BY MOUTH EVERY DAY  Qty: 90 tablet, Refills: 0      pravastatin (PRAVACHOL) 80 MG tablet TAKE 1 TABLET(80 MG) BY MOUTH EVERY EVENING  Qty: 90 tablet, Refills: 1    Associated Diagnoses: Hyperlipidemia LDL goal <70      pregabalin (LYRICA) 75 MG capsule Take 1 capsule (75 mg total) by mouth 2 (two) times daily.  Qty: 60 capsule, Refills: 1    Associated Diagnoses: Numbness and tingling of foot      TRUE METRIX AIR GLUCOSE METER Misc USE AS DIRECTED TO TEST BLOOD SUGAR ONCE DAILY  Qty: 1 each, Refills: 0    Associated Diagnoses: Type 2 diabetes mellitus with hyperglycemia, without long-term current use of insulin      TRULICITY 3 mg/0.5 mL pen injector INJECT 3MG INTO THE SKIN EVERY 7 DAYS  Qty: 12 pen, Refills: 1    Associated Diagnoses: Type 2 diabetes mellitus without complication, without long-term current use of insulin                 Discharge Diagnosis: Sacroiliac joint pain [M53.3]  Condition on Discharge: Stable with no complications to procedure   Diet on Discharge: Same as before.  Activity: as per instruction sheet.  Discharge to: Home with a responsible adult.  Follow up: 2-4 weeks       Please call the office at [phone] if you experience any weakness or loss of sensation, fever > 101.5, pain uncontrolled with oral medications, persistent  nausea/vomiting/or diarrhea, redness or drainage from the incisions, or any other worrisome concerns. If physician on call was not reached or could not communicate with our office for any reason please go to the nearest emergency department

## 2022-06-16 NOTE — DISCHARGE INSTRUCTIONS

## 2022-06-16 NOTE — H&P
HPI  Patient presenting for Procedure(s) (LRB):  Bilateral SIJ Injection (Bilateral)         No health changes since previous encounter    Past Medical History:   Diagnosis Date    Allergic rhinitis     Anxiety     Asthma     Diabetes mellitus, type 2     Hyperlipidemia     Hypertension     Personal history of colonic polyps 07/24/2014    Sarcoidosis      Past Surgical History:   Procedure Laterality Date    FOOT SURGERY  at 11y/o    arch lift     Review of patient's allergies indicates:   Allergen Reactions    Lipitor [atorvastatin]      Other reaction(s): Muscle pain        No current facility-administered medications on file prior to encounter.     Current Outpatient Medications on File Prior to Encounter   Medication Sig Dispense Refill    amLODIPine (NORVASC) 5 MG tablet TAKE 1 TABLET(5 MG) BY MOUTH EVERY DAY 90 tablet 1    albuterol-ipratropium (DUO-NEB) 2.5 mg-0.5 mg/3 mL nebulizer solution Take 3 mLs by nebulization every 6 (six) hours as needed for Wheezing. Rescue (Patient not taking: Reported on 5/13/2022) 1 Box 3    blood sugar diagnostic Strp Once daily glucose testing. 50 strip 6    diclofenac sodium (VOLTAREN) 1 % Gel Apply 2 g topically 4 (four) times daily. for 10 days 100 g 0    DULoxetine (CYMBALTA) 60 MG capsule TAKE 1 CAPSULE(60 MG) BY MOUTH EVERY DAY 90 capsule 1    fluticasone propionate (FLONASE) 50 mcg/actuation nasal spray 2 sprays (100 mcg total) by Each Nostril route once daily. 9.9 mL 0    ipratropium (ATROVENT) 0.02 % nebulizer solution Take 2.5 mLs (500 mcg total) by nebulization once. Rescue for 1 dose 1 vial 0    lancets (LANCETS,THIN) Misc Once daily glucose testing. 50 each 6    meclizine (ANTIVERT) 25 mg tablet Take 1 tablet (25 mg total) by mouth 3 (three) times daily as needed for Dizziness. 30 tablet 0    montelukast (SINGULAIR) 10 mg tablet TAKE 1 TABLET(10 MG) BY MOUTH EVERY EVENING 90 tablet 1    polymyxin B sulf-trimethoprim (POLYTRIM) 10,000 unit- 1  "mg/mL Drop Place 1 drop into both eyes every 6 (six) hours. 10 mL 0    potassium chloride SA (K-DUR,KLOR-CON) 10 MEQ tablet TAKE 1 TABLET BY MOUTH EVERY DAY 90 tablet 0    pravastatin (PRAVACHOL) 80 MG tablet TAKE 1 TABLET(80 MG) BY MOUTH EVERY EVENING 90 tablet 1    TRUE METRIX AIR GLUCOSE METER Misc USE AS DIRECTED TO TEST BLOOD SUGAR ONCE DAILY 1 each 0    TRULICITY 3 mg/0.5 mL pen injector INJECT 3MG INTO THE SKIN EVERY 7 DAYS 12 pen 1        PMHx, PSHx, Allergies, Medications reviewed in epic    ROS negative except pain complaints in HPI    OBJECTIVE:    BP (!) 167/83 (BP Location: Right arm, Patient Position: Sitting)   Pulse 79   Temp 97.9 °F (36.6 °C) (Temporal)   Resp 16   Ht 5' 5" (1.651 m)   Wt 104.5 kg (230 lb 4.3 oz)   LMP 11/23/2013   SpO2 97%   Breastfeeding No   BMI 38.32 kg/m²     PHYSICAL EXAMINATION:    GENERAL: Well appearing, in no acute distress, alert and oriented x3.  PSYCH:  Mood and affect appropriate.  SKIN: Skin color, texture, turgor normal, no rashes or lesions which will impact the procedure.  CV: RRR with palpation of the radial artery.  PULM: No evidence of respiratory difficulty, symmetric chest rise. Clear to auscultation.  NEURO: Cranial nerves grossly intact.    Plan:    Proceed with procedure as planned Procedure(s) (LRB):  Bilateral SIJ Injection (Bilateral)    Sebastien Cameron MD  06/16/2022            "

## 2022-06-16 NOTE — OP NOTE
Sacroiliac Joint Injection under Fluoroscopy    Date of procedure 06/16/2022    Time-out taken to identify patient and procedure side prior to starting the procedure.                                                                 PROCEDURE:  Bilateral sacroiliac joint injection under fluoroscopy.    REASON FOR PROCEDURE: bilateral Sacroiliac joint pain [M53.3]    PHYSICIAN: Sebastien Cameron MD    ASSISTANTS: None    MEDICATIONS INJECTED:  Depo-Medrol 40mg and 4 mL Bupivacaine 0.25%    LOCAL ANESTHETIC USED: Xylocaine 1% 5mL    SEDATION MEDICATIONS: None    ESTIMATED BLOOD LOSS:  None.    COMPLICATIONS:  None.    TECHNIQUE:   Laying in the prone position, the patient was prepped and draped in the usual sterile fashion using ChloraPrep and fenestrated drape.  The area was determined under fluoroscopy.  Local Xylocaine was injected by raising a wheel and going down to the periosteum using a 27-gauge hypodermic needle.  The 3.5 inch 22-gauge spinal needle was introduce into the bilateral sacroiliac joint.  Negative pressure applied to confirm no intravascular placement.  Omnipaque was injected to confirm placement and to confirm that there was no vascular runoff.  The medication was then injected slowly.  The patient tolerated the procedure well.      The patient was monitored for approximately 30 minutes after the procedure.  Patient was given post procedure and discharge instructions to follow at home.  We will see the patient back in two weeks or the patient may call to inform of status. The patient was discharged in a stable condition

## 2022-06-27 DIAGNOSIS — E78.5 HYPERLIPIDEMIA LDL GOAL <70: Chronic | ICD-10-CM

## 2022-07-01 DIAGNOSIS — E78.5 HYPERLIPIDEMIA LDL GOAL <70: Chronic | ICD-10-CM

## 2022-07-01 RX ORDER — PRAVASTATIN SODIUM 80 MG/1
80 TABLET ORAL NIGHTLY
Qty: 90 TABLET | Refills: 1 | Status: SHIPPED | OUTPATIENT
Start: 2022-07-01 | End: 2022-10-28 | Stop reason: ALTCHOICE

## 2022-07-06 NOTE — PROGRESS NOTES
"Subjective:      Patient ID: Leydi Quintero is a 56 y.o. female.    Chief Complaint: Peripheral Neuropathy and Medication Refill    Patient is known to me, being seen today for follow up.     HTN- amlodipine 5mg, controlled   HLD- on statin therapy   DM- A1c 5.8%, trulicity 3mg  Blood sugar 160-170s fasting, admits diet has been poor d/t being busy     Currently on cymbalta for anxiety and depression, has helped with neuropathy somewhat  Only concern is upset stomach but that is more recent (has been on cymbalta for over a year)    Complains of pain in lower back and buttocks, numbness/tingling in feet   Previously saw Pain Med and completed PT    Due for labs      Last visit Jan 2022 with myself    Review of Systems   Constitutional: Negative for chills, diaphoresis and fever.   HENT: Negative for congestion, rhinorrhea and sore throat.    Respiratory: Negative for cough, shortness of breath and wheezing.    Cardiovascular: Negative for chest pain and leg swelling.   Gastrointestinal: Negative for abdominal pain, constipation, diarrhea, nausea and vomiting.   Musculoskeletal: Positive for back pain.   Skin: Negative for rash.   Neurological: Positive for numbness. Negative for dizziness, light-headedness and headaches.       Objective:   /86   Pulse 81   Temp 98.3 °F (36.8 °C) (Tympanic)   Resp 18   Ht 5' 5" (1.651 m)   Wt 105.2 kg (231 lb 14.8 oz)   LMP 11/23/2013   SpO2 98%   BMI 38.59 kg/m²   Physical Exam  Constitutional:       General: She is not in acute distress.     Appearance: Normal appearance. She is well-developed. She is not ill-appearing.   HENT:      Head: Normocephalic and atraumatic.   Cardiovascular:      Rate and Rhythm: Normal rate and regular rhythm.      Pulses:           Dorsalis pedis pulses are 2+ on the right side and 2+ on the left side.      Heart sounds: Normal heart sounds. No murmur heard.  Pulmonary:      Effort: Pulmonary effort is normal. No respiratory " distress.      Breath sounds: Normal breath sounds. No decreased breath sounds.   Musculoskeletal:      Right lower leg: No edema.      Left lower leg: No edema.   Feet:      Right foot:      Protective Sensation: 10 sites tested. 10 sites sensed.      Skin integrity: Skin integrity normal.      Toenail Condition: Right toenails are normal.      Left foot:      Protective Sensation: 10 sites tested. 10 sites sensed.      Skin integrity: Skin integrity normal.      Toenail Condition: Left toenails are normal.   Skin:     General: Skin is warm and dry.      Findings: No rash.   Psychiatric:         Speech: Speech normal.         Behavior: Behavior normal.         Thought Content: Thought content normal.       Assessment:      1. Hyperlipidemia LDL goal <70    2. Hypertension goal BP (blood pressure) < 130/80    3. Type 2 diabetes mellitus without complication, without long-term current use of insulin    4. Encounter for screening mammogram for malignant neoplasm of breast    5. Screen for colon cancer    6. Need for shingles vaccine    7. Lumbar radiculopathy       Plan:   Hyperlipidemia LDL goal <70  -     Lipid Panel; Future; Expected date: 04/28/2022    Hypertension goal BP (blood pressure) < 130/80  -     CBC Auto Differential; Future; Expected date: 04/28/2022  -     Comprehensive Metabolic Panel; Future; Expected date: 04/28/2022    Type 2 diabetes mellitus without complication, without long-term current use of insulin  -     Hemoglobin A1C; Future; Expected date: 04/28/2022  -     Microalbumin/Creatinine Ratio, Urine; Future; Expected date: 04/28/2022    Encounter for screening mammogram for malignant neoplasm of breast  -     Mammo Digital Screening Bilat w/ Devon; Future; Expected date: 04/28/2022    Screen for colon cancer  -     Ambulatory referral/consult to Endo Procedure ; Future; Expected date: 04/29/2022    Need for shingles vaccine  -     (In Office Administered) Zoster Recombinant  Vaccine    Lumbar radiculopathy  -     diclofenac sodium (VOLTAREN) 1 % Gel; Apply 2 g topically 4 (four) times daily. for 10 days  Dispense: 100 g; Refill: 0  -     tiZANidine (ZANAFLEX) 4 MG tablet; Take 1 tablet (4 mg total) by mouth every 8 (eight) hours as needed (spasm).  Dispense: 20 tablet; Refill: 0      F/u Pain Med to be scheduled     Discussed need for tetanus vaccine, declines at this time     Declines dietician   Discussed lifestyle modifications   Consider add on diabetes meds or increase trulicity pending A1c     Discussed worsening signs/symptoms and when to return to clinic or go to ED.   Patient expresses understanding and agrees with treatment plan.    3 = A little assistance

## 2022-07-13 NOTE — PROGRESS NOTES
Procedure instructions reviewed. Place, time, clear liquids on day before procedure, nothing to eat or drink after 2 am dose of prep on am of procedure, no chewing gum or sucking on candy, take BP medication with sip of water at least 2 hours after am dose of prep on am of procedure and inhaler if needed, do not take diabetic medication on am of procedure, have someone to drive them home and bowel prep instructions reviewed with pt. Pt verbalized understanding.

## 2022-07-18 RX ORDER — POTASSIUM CHLORIDE 750 MG/1
TABLET, EXTENDED RELEASE ORAL
Qty: 90 TABLET | Refills: 3 | Status: SHIPPED | OUTPATIENT
Start: 2022-07-18 | End: 2023-09-15

## 2022-07-18 NOTE — TELEPHONE ENCOUNTER
Refill Decision Note   Leydi Quintero  is requesting a refill authorization.  Brief Assessment and Rationale for Refill:  Approve     Medication Therapy Plan:       Medication Reconciliation Completed: No   Comments:     No Care Gaps recommended.     Note composed:12:21 PM 07/18/2022

## 2022-07-18 NOTE — TELEPHONE ENCOUNTER
No new care gaps identified.  [facility] Embedded Care Gaps. Reference number: [id number]. 7/18/2022   12:07:16 PM CDT

## 2022-07-19 ENCOUNTER — ANESTHESIA EVENT (OUTPATIENT)
Dept: ENDOSCOPY | Facility: HOSPITAL | Age: 57
End: 2022-07-19
Payer: COMMERCIAL

## 2022-07-19 ENCOUNTER — ANESTHESIA (OUTPATIENT)
Dept: ENDOSCOPY | Facility: HOSPITAL | Age: 57
End: 2022-07-19
Payer: COMMERCIAL

## 2022-07-19 ENCOUNTER — TELEPHONE (OUTPATIENT)
Dept: PAIN MEDICINE | Facility: CLINIC | Age: 57
End: 2022-07-19
Payer: COMMERCIAL

## 2022-07-19 ENCOUNTER — HOSPITAL ENCOUNTER (OUTPATIENT)
Facility: HOSPITAL | Age: 57
Discharge: HOME | End: 2022-07-19
Attending: INTERNAL MEDICINE | Admitting: INTERNAL MEDICINE
Payer: COMMERCIAL

## 2022-07-19 DIAGNOSIS — Z12.11 COLON CANCER SCREENING: Primary | ICD-10-CM

## 2022-07-19 LAB — GLUCOSE SERPL-MCNC: 117 MG/DL (ref 70–110)

## 2022-07-19 PROCEDURE — 27201012 HC FORCEPS, HOT/COLD, DISP: Performed by: INTERNAL MEDICINE

## 2022-07-19 PROCEDURE — 45380 COLONOSCOPY AND BIOPSY: CPT | Performed by: INTERNAL MEDICINE

## 2022-07-19 PROCEDURE — 63600175 PHARM REV CODE 636 W HCPCS: Performed by: NURSE ANESTHETIST, CERTIFIED REGISTERED

## 2022-07-19 PROCEDURE — 25000003 PHARM REV CODE 250: Performed by: NURSE ANESTHETIST, CERTIFIED REGISTERED

## 2022-07-19 PROCEDURE — 88305 TISSUE EXAM BY PATHOLOGIST: CPT | Mod: 26,,, | Performed by: STUDENT IN AN ORGANIZED HEALTH CARE EDUCATION/TRAINING PROGRAM

## 2022-07-19 PROCEDURE — 45380 COLONOSCOPY AND BIOPSY: CPT | Mod: 33,,, | Performed by: INTERNAL MEDICINE

## 2022-07-19 PROCEDURE — 25000003 PHARM REV CODE 250: Performed by: INTERNAL MEDICINE

## 2022-07-19 PROCEDURE — 88305 TISSUE EXAM BY PATHOLOGIST: ICD-10-PCS | Mod: 26,,, | Performed by: STUDENT IN AN ORGANIZED HEALTH CARE EDUCATION/TRAINING PROGRAM

## 2022-07-19 PROCEDURE — 37000009 HC ANESTHESIA EA ADD 15 MINS: Performed by: INTERNAL MEDICINE

## 2022-07-19 PROCEDURE — 88305 TISSUE EXAM BY PATHOLOGIST: CPT | Performed by: STUDENT IN AN ORGANIZED HEALTH CARE EDUCATION/TRAINING PROGRAM

## 2022-07-19 PROCEDURE — 37000008 HC ANESTHESIA 1ST 15 MINUTES: Performed by: INTERNAL MEDICINE

## 2022-07-19 PROCEDURE — 45380 PR COLONOSCOPY,BIOPSY: ICD-10-PCS | Mod: 33,,, | Performed by: INTERNAL MEDICINE

## 2022-07-19 PROCEDURE — 82962 GLUCOSE BLOOD TEST: CPT | Performed by: INTERNAL MEDICINE

## 2022-07-19 RX ORDER — SODIUM CHLORIDE, SODIUM LACTATE, POTASSIUM CHLORIDE, CALCIUM CHLORIDE 600; 310; 30; 20 MG/100ML; MG/100ML; MG/100ML; MG/100ML
INJECTION, SOLUTION INTRAVENOUS CONTINUOUS PRN
Status: DISCONTINUED | OUTPATIENT
Start: 2022-07-19 | End: 2022-07-19

## 2022-07-19 RX ORDER — LIDOCAINE HYDROCHLORIDE 10 MG/ML
INJECTION, SOLUTION EPIDURAL; INFILTRATION; INTRACAUDAL; PERINEURAL
Status: DISCONTINUED | OUTPATIENT
Start: 2022-07-19 | End: 2022-07-19

## 2022-07-19 RX ORDER — DEXTROMETHORPHAN/PSEUDOEPHED 2.5-7.5/.8
DROPS ORAL
Status: COMPLETED | OUTPATIENT
Start: 2022-07-19 | End: 2022-07-19

## 2022-07-19 RX ORDER — PROPOFOL 10 MG/ML
VIAL (ML) INTRAVENOUS
Status: DISCONTINUED | OUTPATIENT
Start: 2022-07-19 | End: 2022-07-19

## 2022-07-19 RX ADMIN — SIMETHICONE 40 MG: 20 SUSPENSION/ DROPS ORAL at 09:07

## 2022-07-19 RX ADMIN — PROPOFOL 50 MG: 10 INJECTION, EMULSION INTRAVENOUS at 09:07

## 2022-07-19 RX ADMIN — PROPOFOL 80 MG: 10 INJECTION, EMULSION INTRAVENOUS at 09:07

## 2022-07-19 RX ADMIN — PROPOFOL 20 MG: 10 INJECTION, EMULSION INTRAVENOUS at 09:07

## 2022-07-19 RX ADMIN — LIDOCAINE HYDROCHLORIDE 50 MG: 10 INJECTION, SOLUTION EPIDURAL; INFILTRATION; INTRACAUDAL; PERINEURAL at 09:07

## 2022-07-19 RX ADMIN — PROPOFOL 30 MG: 10 INJECTION, EMULSION INTRAVENOUS at 09:07

## 2022-07-19 RX ADMIN — SODIUM CHLORIDE, SODIUM LACTATE, POTASSIUM CHLORIDE, AND CALCIUM CHLORIDE: .6; .31; .03; .02 INJECTION, SOLUTION INTRAVENOUS at 09:07

## 2022-07-19 NOTE — ANESTHESIA POSTPROCEDURE EVALUATION
Anesthesia Post Evaluation    Patient: Leydi Quintero    Procedure(s) Performed: Procedure(s) (LRB):  COLONOSCOPY (N/A)    Final Anesthesia Type: MAC      Patient location during evaluation: GI PACU  Patient participation: Yes- Able to Participate  Level of consciousness: awake and alert  Post-procedure vital signs: reviewed and stable  Pain management: adequate  Airway patency: patent    PONV status at discharge: No PONV  Anesthetic complications: no      Cardiovascular status: blood pressure returned to baseline and hemodynamically stable  Respiratory status: unassisted, spontaneous ventilation and room air  Hydration status: euvolemic  Follow-up not needed.          Vitals Value Taken Time   /76 07/19/22 0945   Temp 36.5 °C (97.7 °F) 07/19/22 0945   Pulse 73 07/19/22 0945   Resp 18 07/19/22 0945   SpO2 95 % 07/19/22 0945         No case tracking events are documented in the log.      Pain/Costa Score: Costa Score: 9 (7/19/2022  9:45 AM)

## 2022-07-19 NOTE — PR[employer]ATION PATIENT INSTRUCTIONS
Discharge Summary/Instructions after an Endoscopic Procedure  Patient Name: Leydi Quintero  Patient MRN: [id number]  Patient YOB: 1965 Tuesday, July 19, 2022 Elisabeth Rodriguez MD  Dear patient,  As a result of recent federal legislation (The Federal Cures Act), you may   receive lab or pathology results from your procedure in your [facility]   account before your physician is able to contact you. Your physician or   their representative will relay the results to you with their   recommendations at their soonest availability.  Thank you,  RESTRICTIONS:  During your procedure today, you received medications for sedation.  These   medications may affect your judgment, balance and coordination.  Therefore,   for 24 hours, you have the following restrictions:   - DO NOT drive a car, operate machinery, make legal/financial decisions,   sign important papers or drink alcohol.    ACTIVITY:  Today: no heavy lifting, straining or running due to procedural   sedation/anesthesia.  The following day: return to full activity including work.  DIET:  Eat and drink normally unless instructed otherwise.     TREATMENT FOR COMMON SIDE EFFECTS:  - Mild abdominal pain, nausea, belching, bloating or excessive gas:  rest,   eat lightly and use a heating pad.  - Sore Throat: treat with throat lozenges and/or gargle with warm salt   water.  - Because air was used during the procedure, expelling large amounts of air   from your rectum or belching is normal.  - If a bowel prep was taken, you may not have a bowel movement for 1-3 days.    This is normal.  SYMPTOMS TO WATCH FOR AND REPORT TO YOUR PHYSICIAN:  1. Abdominal pain or bloating, other than gas cramps.  2. Chest pain.  3. Back pain.  4. Signs of infection such as: chills or fever occurring within 24 hours   after the procedure.  5. Rectal bleeding, which would show as bright red, maroon, or black stools.   (A tablespoon of blood from the rectum is not serious, especially if    hemorrhoids are present.)  6. Vomiting.  7. Weakness or dizziness.  GO DIRECTLY TO THE NEAREST EMERGENCY ROOM IF YOU HAVE ANY OF THE FOLLOWING:      Difficulty breathing              Chills and/or fever over 101 F   Persistent vomiting and/or vomiting blood   Severe abdominal pain   Severe chest pain   Black, tarry stools   Bleeding- more than one tablespoon   Any other symptom or condition that you feel may need urgent attention  Your doctor recommends these additional instructions:  If any biopsies were taken, your doctors clinic will contact you in 1 to 2   weeks with any results.  - Patient has a contact number available for emergencies.  The signs and   symptoms of potential delayed complications were discussed with the   patient.  Return to normal activities tomorrow.  Written discharge   instructions were provided to the patient.   - Discharge patient to home (via wheelchair).   - Resume previous diet today.   - Continue present medications.   - Await pathology results.   - Repeat colonoscopy in 5 years for surveillance.  For questions, problems or results please call your physician Elisabeth Rodriguez MD at Work:  [phone]  If you have any questions about the above instructions, call the GI   department at [phone] or call the endoscopy unit at [phone]   from 7am until 3 pm.  [facility] [place], EMERGENCY ROOM PHONE NUMBER:   [phone]  IF A COMPLICATION OR EMERGENCY SITUATION ARISES AND YOU ARE UNABLE TO REACH   YOUR PHYSICIAN - GO DIRECTLY TO THE EMERGENCY ROOM.  I have read or have had read to me these discharge instructions for my   procedure and have received a written copy.  I understand these   instructions and will follow-up with my physician if I have any questions.     __________________________________       _____________________________________  Nurse Signature                                          Patient/Designated   Responsible Party Signature  Elisabeth  MD Elisabeth Rodriguez MD  7/19/2022 9:49:47 AM  This report has been verified and signed electronically.  Dear patient,  As a result of recent federal legislation (The Federal Cures Act), you may   receive lab or pathology results from your procedure in your [facility]   account before your physician is able to contact you. Your physician or   their representative will relay the results to you with their   recommendations at their soonest availability.  Thank you,  PROVATION

## 2022-07-19 NOTE — H&P
PRE PROCEDURE H&P    Patient Name: Leydi Quintero  MRN: [id number]  : 1965  Date of Procedure:  2022  Referring Physician: Miranda Chavez DO  Primary Physician: Miranda Chavez DO  Procedure Physician: Elisabeth Rodriguez MD       Planned Procedure: Colonoscopy  Diagnosis: previous adenomatous polyp  Chief Complaint: Same as above    HPI: Patient is an 56 y.o. female is here for the above.     Last colonoscopy: 6 years ago   Family history: negative   Anticoagulation: none     Past Medical History:   Past Medical History:   Diagnosis Date    Allergic rhinitis     Anxiety     Asthma     Diabetes mellitus, type 2     Hyperlipidemia     Hypertension     Personal history of colonic polyps 2014    Sarcoidosis         Past Surgical History:  Past Surgical History:   Procedure Laterality Date    FOOT SURGERY  at 11y/o    arch lift    INJECTION OF ANESTHETIC AGENT INTO SACROILIAC JOINT Bilateral 2022    Procedure: Bilateral SIJ Injection;  Surgeon: Sebastien Cameron MD;  Location: [facility];  Service: Pain Management;  Laterality: Bilateral;        Home Medications:  Prior to Admission medications    Medication Sig Start Date End Date Taking? Authorizing Provider   amLODIPine (NORVASC) 5 MG tablet TAKE 1 TABLET(5 MG) BY MOUTH EVERY DAY 22  Yes Miranda Chavez DO   DULoxetine (CYMBALTA) 60 MG capsule TAKE 1 CAPSULE(60 MG) BY MOUTH EVERY DAY 22  Yes Miranda Chavez,    fluticasone propionate (FLONASE) 50 mcg/actuation nasal spray 2 sprays (100 mcg total) by Each Nostril route once daily. 22  Yes Darlene Peters PA-C   polymyxin B sulf-trimethoprim (POLYTRIM) 10,000 unit- 1 mg/mL Drop Place 1 drop into both eyes every 6 (six) hours. 22  Yes Darlene Peters PA-C   pravastatin (PRAVACHOL) 80 MG tablet Take 1 tablet (80 mg total) by mouth every evening. 22  Yes Darlene Peters PA-C   pregabalin (LYRICA) 75 MG capsule Take 1 capsule (75 mg total) by mouth  2 (two) times daily. 4/29/22  Yes Sebastien Cameron MD   TRULICITY 3 mg/0.5 mL pen injector INJECT 3MG INTO THE SKIN EVERY 7 DAYS 4/27/22  Yes Miranda Chavez DO   albuterol-ipratropium (DUO-NEB) 2.5 mg-0.5 mg/3 mL nebulizer solution Take 3 mLs by nebulization every 6 (six) hours as needed for Wheezing. Rescue  Patient not taking: Reported on 5/13/2022 3/24/20   Miranda Chavez DO   blood sugar diagnostic Strp Once daily glucose testing. 12/6/19   Miranda Chavez DO   diclofenac sodium (VOLTAREN) 1 % Gel Apply 2 g topically 4 (four) times daily. for 10 days 4/28/22 5/8/22  Darlene Peters PA-C   ipratropium (ATROVENT) 0.02 % nebulizer solution Take 2.5 mLs (500 mcg total) by nebulization once. Rescue for 1 dose 10/25/18 9/16/21  Georgia Cortez MD   lancets (LANCETS,THIN) Misc Once daily glucose testing. 12/6/19   Miranda Chavez DO   meclizine (ANTIVERT) 25 mg tablet Take 1 tablet (25 mg total) by mouth 3 (three) times daily as needed for Dizziness. 1/4/22   Darlene Peetrs PA-C   montelukast (SINGULAIR) 10 mg tablet TAKE 1 TABLET(10 MG) BY MOUTH EVERY EVENING 1/3/22   Darlene Peters PA-C   potassium chloride SA (K-DUR,KLOR-CON) 10 MEQ tablet TAKE 1 TABLET BY MOUTH EVERY DAY 7/18/22   Miranda Chavez DO   TRUE METRIX AIR GLUCOSE METER Misc USE AS DIRECTED TO TEST BLOOD SUGAR ONCE DAILY 7/17/19   Miranda Chavez DO        Allergies:  Review of patient's allergies indicates:   Allergen Reactions    Lipitor [atorvastatin]      Other reaction(s): Muscle pain        Social History:   Social History     Socioeconomic History    Marital status:     Number of children: 1   Occupational History     Employer: [employer]   Tobacco Use    Smoking status: Never Smoker    Smokeless tobacco: Never Used   Substance and Sexual Activity    Alcohol use: No     Alcohol/week: 0.0 standard drinks    Drug use: No    Sexual activity: Yes     Partners: Male     Birth control/protection:  "Post-menopausal       Family History:  Family History   Problem Relation Age of Onset    Diabetes Mother     Hypertension Mother     Hypertension Father     Hyperlipidemia Father     Breast cancer Sister 58    Colon cancer Neg Hx     Ovarian cancer Neg Hx        ROS: No acute cardiac events, no acute respiratory complaints.     Physical Exam (all patients):    BP (!) 140/86 (BP Location: Left arm, Patient Position: Sitting)   Pulse 76   Temp 98.1 °F (36.7 °C)   Resp 19   Ht 5' 5" (1.651 m)   Wt 99.8 kg (220 lb)   LMP 11/23/2013   SpO2 98%   Breastfeeding No   BMI 36.61 kg/m²   Lungs: Clear to auscultation bilaterally, respirations unlabored  Heart: Regular rate and rhythm, S1 and S2 normal, no obvious murmurs  Abdomen:         Soft, non-tender, bowel sounds normal, no masses, no organomegaly    Lab Results   Component Value Date    WBC 6.90 04/28/2022    MCV 86 04/28/2022    RDW 13.3 04/28/2022     04/28/2022    GLU 89 04/28/2022    HGBA1C 5.9 (H) 04/28/2022    BUN 11 04/28/2022     04/28/2022    K 4.0 04/28/2022     04/28/2022        SEDATION PLAN: per anesthesia      History reviewed, vital signs satisfactory, cardiopulmonary status satisfactory, sedation options, risks and plans have been discussed with the patient  All their questions were answered and the patient agrees to the sedation procedures as planned and the patient is deemed an appropriate candidate for the sedation as planned.    Procedure explained to patient, informed consent obtained and placed in chart.    Elisabeth Rodriguez  7/19/2022  9:16 AM   "

## 2022-07-19 NOTE — ANESTHESIA PREPROCEDURE EVALUATION
07/19/2022  Leydi Quintero is a 56 y.o., female.      Pre-op Assessment    I have reviewed the Patient Summary Reports.     I have reviewed the Nursing Notes. I have reviewed the NPO Status.   I have reviewed the Medications.     Review of Systems  Anesthesia Hx:  No problems with previous Anesthesia  Denies Family Hx of Anesthesia complications.   Denies Personal Hx of Anesthesia complications.   Social:  Non-Smoker    Hematology/Oncology:  Hematology Normal   Oncology Normal     EENT/Dental:EENT/Dental Normal   Cardiovascular:   Exercise tolerance: good Hypertension ECG has been reviewed.    Pulmonary:   Asthma asymptomatic and mild    Renal/:  Renal/ Normal     Hepatic/GI:  Hepatic/GI Normal    Musculoskeletal:  Musculoskeletal Normal    Neurological:  Neurology Normal    Endocrine:   Diabetes, well controlled, type 2  Obesity / BMI > 30  Dermatological:  Skin Normal    Psych:  Psychiatric Normal           Physical Exam  General: Well nourished, Cooperative, Alert and Oriented    Airway:  Mallampati: II   Mouth Opening: Normal  TM Distance: Normal  Tongue: Normal  Neck ROM: Normal ROM    Dental:  Intact  Pt denies loose or removable dentition  Heart:  Rate: Normal  Rhythm: Regular Rhythm        Anesthesia Plan  Type of Anesthesia, risks & benefits discussed:    Anesthesia Type: MAC  Intra-op Monitoring Plan: Standard ASA Monitors  Post Op Pain Control Plan: multimodal analgesia  Induction:  IV  Informed Consent: Informed consent signed with the Patient and all parties understand the risks and agree with anesthesia plan.  All questions answered.   ASA Score: 2  Day of Surgery Review of History & Physical: H&P Update referred to the surgeon/provider.I have interviewed and examined the patient. I have reviewed the patient's H&P dated: There are no significant changes.     Ready For Surgery From  Anesthesia Perspective.     .

## 2022-07-19 NOTE — TRANSFER OF CARE
"Anesthesia Transfer of Care Note    Patient: Leydi Quintero    Procedure(s) Performed: Procedure(s) (LRB):  COLONOSCOPY (N/A)    Patient location: GI    Anesthesia Type: MAC    Transport from OR: Transported from OR on room air with adequate spontaneous ventilation    Post pain: adequate analgesia    Post assessment: no apparent anesthetic complications and tolerated procedure well    Post vital signs: stable    Level of consciousness: awake    Nausea/Vomiting: no nausea/vomiting    Complications: none    Transfer of care protocol was followed      Last vitals:   Visit Vitals  BP (!) 140/86 (BP Location: Left arm, Patient Position: Sitting)   Pulse 76   Temp 36.7 °C (98.1 °F)   Resp 19   Ht 5' 5" (1.651 m)   Wt 99.8 kg (220 lb)   LMP 11/23/2013   SpO2 98%   Breastfeeding No   BMI 36.61 kg/m²     "

## 2022-07-20 ENCOUNTER — OFFICE VISIT (OUTPATIENT)
Dept: PAIN MEDICINE | Facility: CLINIC | Age: 57
End: 2022-07-20
Payer: COMMERCIAL

## 2022-07-20 VITALS
HEIGHT: 65 IN | WEIGHT: 220 LBS | TEMPERATURE: 98 F | BODY MASS INDEX: 36.65 KG/M2 | SYSTOLIC BLOOD PRESSURE: 134 MMHG | RESPIRATION RATE: 16 BRPM | OXYGEN SATURATION: 98 % | HEART RATE: 66 BPM | DIASTOLIC BLOOD PRESSURE: 96 MMHG

## 2022-07-20 VITALS — RESPIRATION RATE: 18 BRPM

## 2022-07-20 DIAGNOSIS — R20.0 NUMBNESS AND TINGLING OF FOOT: ICD-10-CM

## 2022-07-20 DIAGNOSIS — M54.59 LUMBAR FACET JOINT PAIN: ICD-10-CM

## 2022-07-20 DIAGNOSIS — R20.2 NUMBNESS AND TINGLING OF FOOT: ICD-10-CM

## 2022-07-20 DIAGNOSIS — M47.816 LUMBAR SPONDYLOSIS: ICD-10-CM

## 2022-07-20 DIAGNOSIS — M46.1 SACROILIITIS: Primary | ICD-10-CM

## 2022-07-20 LAB — POCT GLUCOSE: 117 MG/DL (ref 70–110)

## 2022-07-20 PROCEDURE — 3066F PR DOCUMENTATION OF TREATMENT FOR NEPHROPATHY: ICD-10-PCS | Mod: CPTII,95,, | Performed by: PHYSICIAN ASSISTANT

## 2022-07-20 PROCEDURE — 3072F PR LOW RISK FOR RETINOPATHY: ICD-10-PCS | Mod: CPTII,95,, | Performed by: PHYSICIAN ASSISTANT

## 2022-07-20 PROCEDURE — 3061F PR NEG MICROALBUMINURIA RESULT DOCUMENTED/REVIEW: ICD-10-PCS | Mod: CPTII,95,, | Performed by: PHYSICIAN ASSISTANT

## 2022-07-20 PROCEDURE — 3044F HG A1C LEVEL LT 7.0%: CPT | Mod: CPTII,95,, | Performed by: PHYSICIAN ASSISTANT

## 2022-07-20 PROCEDURE — 3044F PR MOST RECENT HEMOGLOBIN A1C LEVEL <7.0%: ICD-10-PCS | Mod: CPTII,95,, | Performed by: PHYSICIAN ASSISTANT

## 2022-07-20 PROCEDURE — 99214 PR OFFICE/OUTPT VISIT, EST, LEVL IV, 30-39 MIN: ICD-10-PCS | Mod: 95,,, | Performed by: PHYSICIAN ASSISTANT

## 2022-07-20 PROCEDURE — 99214 OFFICE O/P EST MOD 30 MIN: CPT | Mod: 95,,, | Performed by: PHYSICIAN ASSISTANT

## 2022-07-20 PROCEDURE — 3072F LOW RISK FOR RETINOPATHY: CPT | Mod: CPTII,95,, | Performed by: PHYSICIAN ASSISTANT

## 2022-07-20 PROCEDURE — 3061F NEG MICROALBUMINURIA REV: CPT | Mod: CPTII,95,, | Performed by: PHYSICIAN ASSISTANT

## 2022-07-20 PROCEDURE — 3066F NEPHROPATHY DOC TX: CPT | Mod: CPTII,95,, | Performed by: PHYSICIAN ASSISTANT

## 2022-07-20 RX ORDER — PREGABALIN 75 MG/1
75 CAPSULE ORAL 2 TIMES DAILY
Qty: 60 CAPSULE | Refills: 1 | Status: SHIPPED | OUTPATIENT
Start: 2022-07-20

## 2022-07-20 NOTE — H&P (VIEW-ONLY)
The patient location is: home  The chief complaint leading to consultation is: chronic pain     Visit type: audiovisual    Face to Face time with patient: 10-15 minutes  20 minutes of total time spent on the encounter, which includes face to face time and non-face to face time preparing to see the patient (eg, review of tests), Obtaining and/or reviewing separately obtained history, Documenting clinical information in the electronic or other health record, Independently interpreting results (not separately reported) and communicating results to the patient/family/caregiver, or Care coordination (not separately reported).     Each patient to whom he or she provides medical services by telemedicine is:  (1) informed of the relationship between the physician and patient and the respective role of any other health care provider with respect to management of the patient; and (2) notified that he or she may decline to receive medical services by telemedicine and may withdraw from such care at any time.        Chief Pain Complaint:  Low back pain into buttocks - occasionally down leg but mostly in lower back & buttock  Bilateral foot numbness     Interval History (7/20/2022): Leydi Quintero presents today for follow-up on telemedicine visit.  she underwent bilateral SIJ injection on 6/16/22.  The patient reports that she is/was better following the procedure. Reports 90% pain relief on left, 50% pain relief on right.  The changes lasted 4 weeks so far.  The changes have continued through this visit.   Lyrica was started at previous visit, but she ran out so is no longer taking. She continues taking Zanaflex 4mg PRN.    Interval History (4/29/2022):  Leydi Quintero presents today for follow-up visit.  Patient was last seen almost 2 years ago. At that visit, the plan was to get EMG of the legs to evaluate numbness in the feet, which she canceled due to COVID at the time.  She feels the pain worsens with  activity.  Pain is intermittent, comes and goes and is never constant.  She has done physical therapy in the past with relief, but the pain returns shortly after she completes the sessions.  She could not tolerate gabapentin in the past.  Lyrica was not approved by insurance.  She was recently given Zanaflex from her primary care, which she took last night, and she got a good night's rest for the 1st time in a while.  She was also given Voltaren gel, which was not yet approved by insurance.    History of Present Illness (6/18/20):   Ms. Quintero returns to clinic for follow-up.  She has obtained her lumbar MRI which we will review today.  She continues have pain located primarily low back in the posterior lower legs.  She rates her pain currently as a 4/10.  She continues to have pain located in the axial lumbar spine in addition to the bilateral lower extremities.  MRI shows minor facet arthropathy L3/4-L4/5 in addition to minimal left L4/5 neural foraminal stenosis.  She has started on gabapentin however this was causing excessive drowsiness in addition to an increase in appetite therefore she has been switched to Lyrica 50 mg capsules twice daily which she has found to be much more helpful.  She continues to have pain in her lower legs in a nondermatomal distribution but reports that radiates from her low back distally into her feet.  She reports the right leg is worse than the left leg currently.  She completed physical therapy of greater than 6 weeks in February of 2020 and continues to perform therapy exercises at home as tolerated.    Initial HPI (6/4/20):  This patient is a 54 y.o. female who presents today complaining of the above noted pain/s. The patient describes the pain as follows.  Ms. Quintero is new patient clinic with complaints of low back and leg pain for approximately one year.  There was no inciting event and currently rates her pain as 8/10.  She describes mostly a sharp, aching, with numbness and  tingling in her legs bilaterally.  Her symptoms worse with walking and standing for long periods of time her somewhat improved with rest and propping her feet up.  She has not really tried anything with medications however she has been physical therapy which he completed 4 months ago greater than 6 weeks physical therapy with some benefit.  She continues to walk for exercise at home in addition to performing physical therapy exercises.  She has tried Tylenol over-the-counter with minimal symptomatic pain relief.  She finds that her right leg seems to be worse in the left leg and she endorses having numbness, weakness, tingling in bilateral lower extremities.  She does wear compression socks which did provide some benefit however these of loss her effectiveness.  She denies having had surgery injections in her lumbar spine.  She finds that heat and massage do provide some benefit.    Previous Therapy:  Medications:  Tylenol, gabapentin, Lyrica  Injections:   - bilateral SIJ injection on 6/16/22 with 90% pain relief on left, 50% on right  Surgeries: No spinal surgery   Physical Therapy: Completed in the Past, completed greater than 6 weeks of physical therapy approximately 4 months ago       Imaging / Labs / Studies (reviewed on 7/20/2022):    06/16/2020 MRI Lumbar Spine Without Contrast  COMPARISON: None    FINDINGS:  The distal cord and conus reveal normal signal and morphology. The lumbar vertebra reveal normal alignment, shape and signal intensity.    T12-L1: Unremarkable.    L1-2:  Unremarkable.  Incidental demonstration of a 3 cm right renal cysts.    L2-3:  Unremarkable.    L3-4:  Minor facet hypertrophy.    L4-5:  Mild disc desiccation with annular bulge.  Left foraminal annular fissure.  Minor left foraminal stenosis.  Minor facet hypertrophy.    L5-S1:  Unremarkable.    Impression  Mild L4-5 degenerative disc disease with annular bulge and left foraminal annular fissure.  Minor left foraminal stenosis.   Minor multilevel facet arthrosis.        Review of Systems:  Constitutional: Negative for fever.   Eyes: Negative for blurred vision.   Respiratory: Negative for cough and wheezing.    Cardiovascular: Negative for chest pain and orthopnea.   Gastrointestinal: Negative for constipation, diarrhea, nausea and vomiting.   Genitourinary: Negative for dysuria.   Musculoskeletal: Positive for back pain.        Lumbar radiculopathy   Skin: Negative for itching and rash.   Neurological: Positive for tingling and weakness.   Endo/Heme/Allergies: Does not bruise/bleed easily.       Physical Exam:  Telemedicine Exam  Vitals:    07/20/22 1221   Resp: 18     There is no height or weight on file to calculate BMI.   (reviewed on 7/20/2022)     GENERAL: Well appearing, in no acute distress, alert and oriented x3.  Cooperative.  PSYCH:  Mood and affect appropriate.  SKIN: Skin color & texture with no obvious abnormalities.    HEAD/FACE:  Normocephalic, atraumatic.    PULM:  No difficulty breathing. No nasal flaring. No obvious wheezing.  BACK: Palpation over the lumbar paraspinous muscles causes pain. Some pain with flexion. Some pain with extension.  Patient performed Facet loading causes some pain. Patient performed Jeferson's/ Kailash's positive bilaterally.  EXTREMITIES: No obvious deformities. Moving all extremities well, appears to have symmetric strength throughout.  MUSCULOSKELETAL: No obvious atrophy abnormalities are noted.   NEURO: No obvious neurologic deficit.   GAIT: sitting.     Physical Exam: last in clinic visit:  General  Constitutional: She is oriented to person, place, and time. She appears well-developed and well-nourished.   HENT:   Head: Normocephalic and atraumatic.   Eyes: EOM are normal.   Neck: Neck supple.   Pulmonary/Chest: Effort normal.   Abdominal: She exhibits no distension.   Neurological: She is alert and oriented to person, place, and time. No cranial nerve deficit.   Psychiatric: She has a normal  mood and affect.     General Musculoskeletal Exam   Gait: normal     Back (L-Spine & T-Spine) / Neck (C-Spine) Exam     Back (L-Spine & T-Spine) Range of Motion   Extension: normal   Flexion: normal   Lateral bend right: normal   Lateral bend left: normal     Spinal Sensation   Right Side Sensation  L-Spine Level: normal  Left Side Sensation  L-Spine Level: normal    Comments:  Sensation intact to light touch bilateral lower extremities; positive straight leg raise for radicular symptoms bilaterally; increased pain lumbar extension and flexion    Muscle Strength   Right Lower Extremity   Hip Flexion: 5/5   Quadriceps:  5/5   Hamstrin/5   EHL:  5/5  Left Lower Extremity   Hip Flexion: 5/5   Quadriceps:  5/5   Hamstrin/5   EHL:  5/5    Reflexes   Left Side  Achilles:  2+  Ankle Clonus:  absent  Quadriceps:  2+  Right Side   Achilles:  2+  Ankle Clonus:  absent  Quadriceps:  2+      Assessment  1. 56 y.o. year old patient with PMH of   Past Medical History:   Diagnosis Date    Allergic rhinitis     Anxiety     Asthma     Diabetes mellitus, type 2     Hyperlipidemia     Hypertension     Personal history of colonic polyps 2014    Sarcoidosis       presenting with pain located lumbar spine:    ICD-10-CM ICD-9-CM    1. Sacroiliitis  M46.1 720.2    2. Lumbar spondylosis  M47.816 721.3 IR Facet Inj Lumbar 3rd Vert Bi      IR Facet Inj Lumbar 2nd Vert Bi      IR Facet Inj Lumbar 1st Vert Bi      Case Request-RAD/Other Procedure Area: diagnostic bilateral L3-5 MBB      IR Facet Inj Lumbar 3rd Vert Bi      IR Facet Inj Lumbar 2nd Vert Bi      IR Facet Inj Lumbar 1st Vert Bi      Case Request-RAD/Other Procedure Area: diagnostic bilateral L3-5 MBB (2nd)      IR RF Ablation Lumbar with Imaging      IR RF Ablation Lumbar with Imaging      IR RF Ablation Lumbar with Imaging      Case Request-RAD/Other Procedure Area: Left L3-5 Lumbar RFA      IR RF Ablation Lumbar with Imaging      IR RF Ablation Lumbar with  Imaging      IR RF Ablation Lumbar with Imaging      Case Request-RAD/Other Procedure Area: Right L3-5 Lumbar RFA   3. Lumbar facet joint pain  M54.59 719.48       2. Pain Generators / Etiology: Lumbar Radiculopathy and Lumbar Spondylosis  3. Failed Meds (E- Effective, NE- Not Effective):  Tylenol-minimally effective  4. Physical Therapy - Completed in the Past greater than 6 weeks physical therapy approximately 4 months ago  5. Psychological comorbidities - None  6. Anticoagulants / Antiplatelets: None     PLAN:  1. Interventional:   - S/p bilateral SIJ injection on 6/16/22 with 90% pain relief on left, 50% on right  - Schedule diagnostic bilateral L3-5 MBB. Will call for % relief to determine RFA eligibility.   - Will also place orders for 2nd diagnostic bilateral L3-5 MBB that will be required by insurance, along with right L3-5 RFA then left L3-5 RFA. If appropriate to move forward, staff can schedule those procedures when calling to document % pain relief.  - If pain not improved after MBB, will have her come into clinic for further evaluation. Consider MYNOR.   - Previously scheduled for Bilateral L3-5 MBB, which was not completed.     2. Pharmacologic:   - ReStart Lyrica 75mg BID to help with numbness in the feet.  Consider further Increase if no improvement after 3 weeks.  Patient informed not to stop taking if she does not find significant pain relief.  Has not tolerated gabapentin in the past.  - Continue Zanaflex 4 mg QHS PRN, which helped her get some rest last night.    - Will complete prior Auth for Voltaren 1% gel-recently prescribed by PCP.  - Anticoagulation use: None.     3. Rehabilitative: Encouraged regular exercise. She previously completed greater than 6 weeks physical therapy in the past with minimal symptomatic relief; she continues to walk for exercise and performs physical therapy exercises at home on a daily basis.     4. Diagnostic: BLE EMG/NCS ordered at last visit - not completed.  Will  reorder to evaluate foot numbness, which does not appear to be from lumbar radicular sources.  Has a history of diabetes.    5. Follow up: Will call for % relief to determine RFA eligibility     - This condition does not require this patient to take time off of work, and the primary goal of our Pain Management services is to improve the patient's functional capacity.   - I discussed the risks, benefits, and alternatives to potential treatment options. All questions and concerns were fully addressed today in clinic.           Disclaimer:  This note was prepared using voice recognition system and is likely to have sound alike errors that may have been overlooked even after proof reading.  Please call me with any questions.

## 2022-07-20 NOTE — PATIENT INSTRUCTIONS
"Diagnostic Medial Branch Block - Patient Information    What are facet joints?  Bones called vertebrae make up your spine. Each vertebra has facets (flat surfaces) that touch where the vertebrae fit together. These form a structure called a facet joint on each side of the vertebrae.Back or neck pain may be caused by a problem with your facet joints. If these joints are blocked or numbed, they will not be able to transfer the painful sensation to the brain.   Therefore, this procedure is completed to see if your back pain is (solely or in part) caused by the facet joints.        How is the procedure performed?  The patient lies on his/her stomach. The skin of the back or neck is cleansed with antiseptic solution and a local anesthetic in injected to numb the area. A small needle is then guided using an X-ray to the targeted facet joints which are then numbed. An anesthetic is then injected into the joint.   The injection takes about 15 minutes to complete.    Where will your procedure be performed?  The treatment is done in a hospital or surgery center. Youll be asked to fill out some forms, including a consent form. You may also be examined prior to.         Will the injection hurt?  There is some discomfort with needle insertion which we minimize by numbing the skin over the joint with a   local anesthetic. You may elect to have a small amount of sedating medication to help with discomfort and to   help you relax.     How long does the effect last?  The pain relief will only last a few hours/ less than 12 hours. Pain relief in the first few hours after   the injection is the most important as this tells us our diagnosis of facet joint mediated pain is correct. If the "test injection" provides pain relief, we can discuss other options available for extended pain relief, such a radiofrequency ablation (RFA).    What is the next step after the injection?  Our staff will call you the next day to document pain relief " obtained after the procedure. If this pain relief is significant, we can move forward with next part of the treatment: RFA (radiofrequency ablation), which can provide an extended pain relief from 6 months to 18 months.     What are the risks and side effects?  Serious side effects and complications are rare. The most common problem after the injection is having pain in   the area of the injection for a few days. The other complications are infection, bleeding and nerve injury. These complications are minimized by stopping blood thinners, using sterile technique, and fluoroscopy for xray needle guidance.    After the Procedure:  Most often, you can go home in about an hour. Our procedure center requires you to have an adult friend or relative drive you to and from the facility. The anesthetic wears off in a few hours. When it does, your back or neck may feel more sore than usual. This is normal. Take it easy for the rest of the day.

## 2022-07-20 NOTE — TELEPHONE ENCOUNTER
"Contacted patient; first diagnostic injection scheduled July 28th.  Instructions given verbally and also sent via [employer].  Patient verbalized understanding.      ----- Message from Zaria Tobin PA-C sent at 7/20/2022 12:18 PM CDT -----  - Schedule diagnostic bilateral L3-5 MBB. Will call for % relief to determine RFA eligibility.     Set reminder to staff inbox for day after procedure to get % pain relief. Include "if pain relief >75%, set up 2nd diagnostic bilateral L3-5 MBB that will be required by insurance."     After calling to get results of 2nd MBB, can go ahead and schedule right L3-5 RFA then left L3-5 RFA.       "

## 2022-07-21 ENCOUNTER — PATIENT MESSAGE (OUTPATIENT)
Dept: PAIN MEDICINE | Facility: CLINIC | Age: 57
End: 2022-07-21
Payer: COMMERCIAL

## 2022-07-21 NOTE — PRE-PROCEDURE INSTRUCTIONS
Spoke with patient regarding procedure scheduled on 7.28    Arrival time 0740    Has patient been sick with fever or on antibiotics within the last 7 days? No    Does the patient have any open wounds, sores or rashes? No    Does the patient have any recent fractures? no    Has patient received a vaccination within the last 7 days? No    Received the COVID vaccination? yes    Has the patient stopped all medications as directed? NA    Does patient have a pacemaker and or defibrillator? no    Does the patient have a ride to and from procedure and someone reliable to remain with patient? Daughter or      Is the patient diabetic? yes    Does the patient have sleep apnea? Or use O2 at home? No and no     Is the patient receiving sedation? yes    Is the patient instructed to remain NPO beginning at midnight the night before their procedure? yes    Procedure location confirmed with patient? Yes    Covid- Denies signs/symptoms. Instructed to notify PAT/MD if any changes.

## 2022-07-25 ENCOUNTER — TELEPHONE (OUTPATIENT)
Dept: PAIN MEDICINE | Facility: CLINIC | Age: 57
End: 2022-07-25
Payer: COMMERCIAL

## 2022-07-26 NOTE — PRE-PROCEDURE INSTRUCTIONS
Spoke with patient regarding procedure scheduled on 7.28     Arrival time 1020     Has patient been sick with fever or on antibiotics within the last 7 days? No     Does the patient have any open wounds, sores or rashes? No     Does the patient have any recent fractures? no     Has patient received a vaccination within the last 7 days? No     Received the COVID vaccination?      Has the patient stopped all medications as directed? NA     Does patient have a pacemaker and or defibrillator? no     Does the patient have a ride to and from procedure and someone reliable to remain with patient? jolie     Is the patient diabetic? yes     Does the patient have sleep apnea? Or use O2 at home? no       Is the patient receiving sedation? yes     Is the patient instructed to remain NPO beginning at midnight the night before their procedure? yes     Procedure location confirmed with patient? Yes     Covid- Denies signs/symptoms. Instructed to notify PAT/MD if any changes.

## 2022-07-27 DIAGNOSIS — R09.81 SINUS CONGESTION: ICD-10-CM

## 2022-07-28 ENCOUNTER — HOSPITAL ENCOUNTER (OUTPATIENT)
Facility: HOSPITAL | Age: 57
Discharge: HOME | End: 2022-07-28
Attending: ANESTHESIOLOGY | Admitting: ANESTHESIOLOGY
Payer: COMMERCIAL

## 2022-07-28 VITALS
TEMPERATURE: 97 F | RESPIRATION RATE: 16 BRPM | DIASTOLIC BLOOD PRESSURE: 94 MMHG | HEART RATE: 80 BPM | OXYGEN SATURATION: 97 % | SYSTOLIC BLOOD PRESSURE: 160 MMHG

## 2022-07-28 DIAGNOSIS — M47.816 LUMBAR SPONDYLOSIS: ICD-10-CM

## 2022-07-28 LAB — POCT GLUCOSE: 108 MG/DL (ref 70–110)

## 2022-07-28 PROCEDURE — 82962 GLUCOSE BLOOD TEST: CPT | Performed by: ANESTHESIOLOGY

## 2022-07-28 PROCEDURE — 64493 PR INJ DX/THER AGNT PARAVERT FACET JOINT,IMG GUIDE,LUMBAR/SAC,1ST LVL: ICD-10-PCS | Mod: 50,,, | Performed by: ANESTHESIOLOGY

## 2022-07-28 PROCEDURE — 64494 INJ PARAVERT F JNT L/S 2 LEV: CPT | Mod: 50 | Performed by: ANESTHESIOLOGY

## 2022-07-28 PROCEDURE — 63600175 PHARM REV CODE 636 W HCPCS: Performed by: ANESTHESIOLOGY

## 2022-07-28 PROCEDURE — 64494 INJ PARAVERT F JNT L/S 2 LEV: CPT | Mod: 50,,, | Performed by: ANESTHESIOLOGY

## 2022-07-28 PROCEDURE — 64493 INJ PARAVERT F JNT L/S 1 LEV: CPT | Mod: 50 | Performed by: ANESTHESIOLOGY

## 2022-07-28 PROCEDURE — 64493 INJ PARAVERT F JNT L/S 1 LEV: CPT | Mod: 50,,, | Performed by: ANESTHESIOLOGY

## 2022-07-28 PROCEDURE — 64494 PR INJ DX/THER AGNT PARAVERT FACET JOINT,IMG GUIDE,LUMBAR/SAC, 2ND LEVEL: ICD-10-PCS | Mod: 50,,, | Performed by: ANESTHESIOLOGY

## 2022-07-28 PROCEDURE — 25000003 PHARM REV CODE 250: Performed by: ANESTHESIOLOGY

## 2022-07-28 PROCEDURE — 99152 MOD SED SAME PHYS/QHP 5/>YRS: CPT | Performed by: ANESTHESIOLOGY

## 2022-07-28 RX ORDER — LIDOCAINE HYDROCHLORIDE 10 MG/ML
INJECTION, SOLUTION EPIDURAL; INFILTRATION; INTRACAUDAL; PERINEURAL
Status: DISCONTINUED | OUTPATIENT
Start: 2022-07-28 | End: 2022-07-28 | Stop reason: HOSPADM

## 2022-07-28 RX ORDER — FENTANYL CITRATE 50 UG/ML
INJECTION, SOLUTION INTRAMUSCULAR; INTRAVENOUS
Status: DISCONTINUED | OUTPATIENT
Start: 2022-07-28 | End: 2022-07-28 | Stop reason: HOSPADM

## 2022-07-28 RX ORDER — BUPIVACAINE HYDROCHLORIDE 5 MG/ML
INJECTION, SOLUTION EPIDURAL; INTRACAUDAL
Status: DISCONTINUED | OUTPATIENT
Start: 2022-07-28 | End: 2022-07-28 | Stop reason: HOSPADM

## 2022-07-28 RX ORDER — ONDANSETRON 2 MG/ML
4 INJECTION INTRAMUSCULAR; INTRAVENOUS ONCE AS NEEDED
Status: DISCONTINUED | OUTPATIENT
Start: 2022-07-28 | End: 2022-07-28 | Stop reason: HOSPADM

## 2022-07-28 RX ORDER — MIDAZOLAM HYDROCHLORIDE 1 MG/ML
INJECTION, SOLUTION INTRAMUSCULAR; INTRAVENOUS
Status: DISCONTINUED | OUTPATIENT
Start: 2022-07-28 | End: 2022-07-28 | Stop reason: HOSPADM

## 2022-07-28 NOTE — PLAN OF CARE
Discharge criteria met. Pt verbalized understanding of discharge instructions. Pt to be wheeled to vehicle.

## 2022-07-28 NOTE — DISCHARGE SUMMARY
Discharge Note  Short Stay      SUMMARY     Admit Date: 7/28/2022    Attending Physician: Nick Kruger MD        Discharge Physician: Nick Kruger MD        Discharge Date: 7/28/2022 11:54 AM    Procedure(s) (LRB):  diagnostic bilateral L3-5 MBB (Bilateral)    Final Diagnosis: Lumbar spondylosis [M47.816]    Disposition: Home or self care    Patient Instructions:   Current Discharge Medication List      CONTINUE these medications which have NOT CHANGED    Details   amLODIPine (NORVASC) 5 MG tablet TAKE 1 TABLET(5 MG) BY MOUTH EVERY DAY  Qty: 90 tablet, Refills: 1    Associated Diagnoses: Hypertension goal BP (blood pressure) < 130/80      DULoxetine (CYMBALTA) 60 MG capsule TAKE 1 CAPSULE(60 MG) BY MOUTH EVERY DAY  Qty: 90 capsule, Refills: 1    Associated Diagnoses: Anxiety and depression      TRULICITY 3 mg/0.5 mL pen injector INJECT 3MG INTO THE SKIN EVERY 7 DAYS  Qty: 12 pen, Refills: 1    Associated Diagnoses: Type 2 diabetes mellitus without complication, without long-term current use of insulin      albuterol-ipratropium (DUO-NEB) 2.5 mg-0.5 mg/3 mL nebulizer solution Take 3 mLs by nebulization every 6 (six) hours as needed for Wheezing. Rescue  Qty: 1 Box, Refills: 3    Associated Diagnoses: Mild intermittent asthma with exacerbation      blood sugar diagnostic Strp Once daily glucose testing.  Qty: 50 strip, Refills: 6    Associated Diagnoses: Controlled type 2 diabetes mellitus without complication, without long-term current use of insulin      diclofenac sodium (VOLTAREN) 1 % Gel Apply 2 g topically 4 (four) times daily. for 10 days  Qty: 100 g, Refills: 0    Associated Diagnoses: Lumbar radiculopathy      fluticasone propionate (FLONASE) 50 mcg/actuation nasal spray 2 sprays (100 mcg total) by Each Nostril route once daily.  Qty: 9.9 mL, Refills: 0    Associated Diagnoses: Bacterial sinusitis      ipratropium (ATROVENT) 0.02 % nebulizer solution Take 2.5 mLs (500 mcg total) by nebulization  once. Rescue for 1 dose  Qty: 1 vial, Refills: 0    Associated Diagnoses: Mild intermittent asthma with exacerbation      lancets (LANCETS,THIN) Misc Once daily glucose testing.  Qty: 50 each, Refills: 6    Associated Diagnoses: Controlled type 2 diabetes mellitus without complication, without long-term current use of insulin      meclizine (ANTIVERT) 25 mg tablet Take 1 tablet (25 mg total) by mouth 3 (three) times daily as needed for Dizziness.  Qty: 30 tablet, Refills: 0    Associated Diagnoses: Dizziness      montelukast (SINGULAIR) 10 mg tablet TAKE 1 TABLET(10 MG) BY MOUTH EVERY EVENING  Qty: 90 tablet, Refills: 1    Associated Diagnoses: Sinus congestion      polymyxin B sulf-trimethoprim (POLYTRIM) 10,000 unit- 1 mg/mL Drop Place 1 drop into both eyes every 6 (six) hours.  Qty: 10 mL, Refills: 0      potassium chloride SA (K-DUR,KLOR-CON) 10 MEQ tablet TAKE 1 TABLET BY MOUTH EVERY DAY  Qty: 90 tablet, Refills: 3      pravastatin (PRAVACHOL) 80 MG tablet Take 1 tablet (80 mg total) by mouth every evening.  Qty: 90 tablet, Refills: 1    Associated Diagnoses: Hyperlipidemia LDL goal <70      pregabalin (LYRICA) 75 MG capsule Take 1 capsule (75 mg total) by mouth 2 (two) times daily.  Qty: 60 capsule, Refills: 1    Associated Diagnoses: Numbness and tingling of foot      TRUE METRIX AIR GLUCOSE METER Misc USE AS DIRECTED TO TEST BLOOD SUGAR ONCE DAILY  Qty: 1 each, Refills: 0    Associated Diagnoses: Type 2 diabetes mellitus with hyperglycemia, without long-term current use of insulin                 Discharge Diagnosis: Lumbar spondylosis [M47.816]  Condition on Discharge: Stable with no complications to procedure   Diet on Discharge: Same as before.  Activity: as per instruction sheet.  Discharge to: Home with a responsible adult.  Follow up: 2-4 weeks       Please call the office at [phone] if you experience any weakness or loss of sensation, fever > 101.5, pain uncontrolled with oral medications,  persistent nausea/vomiting/or diarrhea, redness or drainage from the incisions, or any other worrisome concerns. If physician on call was not reached or could not communicate with our office for any reason please go to the nearest emergency department

## 2022-07-28 NOTE — OP NOTE
LUMBAR Medial Branch Block Under Fluoroscopy. Bilateral L4/L5 and L5/S1    INFORMED CONSENT: The procedure, risks, benefits and options were discussed with patient. There are no contraindications to the procedure. The patient expressed understanding and agreed to proceed. The personnel performing the procedure was discussed.    Date of procedure 07/28/2022    Time-out taken to identify patient and procedure side prior to starting the procedure.                                                                PROCEDURE:  Bilateral medial branch block at the transverse processes at the level of L4, L5, and the sacral ala    Pre Procedure diagnosis:    Lumbar spondylosis [M47.816]  1. Lumbar spondylosis        Post-Procedure diagnosis:   same    PHYSICIAN: Nick Kruger MD  ASSISTANTS: None    MEDICATIONS INJECTED: 0.5% bupivicane, 1mL at each level    LOCAL ANESTHETIC USED: Lidocaine, 1%, 1ml at each level    ESTIMATED BLOOD LOSS:  None.    COMPLICATIONS:  None.    Sedation: Conscious sedation provided by M.D    SEDATION MEDICATIONS: local/IV sedation: Versed 2 mg and fentanyl 25 mcg IV.  Conscious sedation ordered by MD.  Patient reevaluated and sedation administered by MD and monitored by RN.  Total sedation time was less than 15 min.    Total sedation time was >10 but <20 min      TECHNIQUE: Laying in a prone position, the patient was prepped and draped in the usual sterile fashion using ChloraPrep and fenestrated drape.  The level was determined under fluoroscopic guidance.  Local anesthetic was given by going down to the hub of the 27-gauge 1.25in needle and raising a wheel.  A 25-gauge 3.5inch needle was introduced to the anatomic local of the medial branch at each of the above levels using fluoroscopy in the AP, oblique, and lateral views.  After negative aspiration, medication was injected slowly. The patient tolerated the procedure well.       The patient was monitored after the procedure.  Patient was  given post procedure and discharge instructions to follow at home.  We will see the patient back in two weeks or the patient may call to inform of status. The patient was discharged in a stable condition

## 2022-07-28 NOTE — DISCHARGE INSTRUCTIONS

## 2022-07-28 NOTE — INTERVAL H&P NOTE
The patient has been examined and the H&P has been reviewed:    I concur with the findings and no changes have occurred since H&P was written.    Anesthesia and Procedure risks, benefits and alternative options discussed and understood by patient/family.          There are no hospital problems to display for this patient.

## 2022-07-29 ENCOUNTER — TELEPHONE (OUTPATIENT)
Dept: PAIN MEDICINE | Facility: CLINIC | Age: 57
End: 2022-07-29
Payer: COMMERCIAL

## 2022-07-29 LAB
FINAL PATHOLOGIC DIAGNOSIS: NORMAL
GROSS: NORMAL
Lab: NORMAL

## 2022-07-29 RX ORDER — MONTELUKAST SODIUM 10 MG/1
10 TABLET ORAL NIGHTLY
Qty: 90 TABLET | Refills: 1 | Status: SHIPPED | OUTPATIENT
Start: 2022-07-29

## 2022-07-29 NOTE — TELEPHONE ENCOUNTER
"----- Message from Harrison Mcmanus MA sent at 7/29/2022  8:12 AM CDT -----  Regarding: FW: Pain Relief     get % pain relief. Include "if pain relief >75%, set up 2nd diagnostic bilateral L3-5 MBB that will be required by insurance."     After calling to get results of 2nd MBB, can go ahead and schedule right L3-5 RFA then left L3-5 RFA"     #PLEASE CONTACT PATIENT FOR PAIN RELIEF"     ----- Message -----  From: Noe Delgadillo LPN  Sent: 7/29/2022   8:00 AM CDT  To: Nisha Crowe Staff, Crista TEJADA Staff  Subject: Pain Relief                                      "- Schedule diagnostic bilateral L3-5 MBB. Will call for % relief to determine RFA eligibility.     Set reminder to staff inbox for day after procedure to get % pain relief. Include "if pain relief >75%, set up 2nd diagnostic bilateral L3-5 MBB that will be required by insurance."     After calling to get results of 2nd MBB, can go ahead and schedule right L3-5 RFA then left L3-5 RFA"    #PLEASE CONTACT PATIENT FOR PAIN RELIEF"         "

## 2022-07-29 NOTE — TELEPHONE ENCOUNTER
LM for pt to get percent of relief and if 75% or over you can schedule second MBB diagnostic. Orders are in

## 2022-08-12 ENCOUNTER — LAB VISIT (OUTPATIENT)
Dept: LAB | Facility: HOSPITAL | Age: 57
End: 2022-08-12
Attending: INTERNAL MEDICINE
Payer: COMMERCIAL

## 2022-08-12 DIAGNOSIS — I10 HYPERTENSION GOAL BP (BLOOD PRESSURE) < 130/80: Chronic | ICD-10-CM

## 2022-08-12 DIAGNOSIS — E11.9 CONTROLLED TYPE 2 DIABETES MELLITUS WITHOUT COMPLICATION, WITHOUT LONG-TERM CURRENT USE OF INSULIN: ICD-10-CM

## 2022-08-12 DIAGNOSIS — E78.5 HYPERLIPIDEMIA LDL GOAL <70: ICD-10-CM

## 2022-08-12 LAB
CHOLEST SERPL-MCNC: 240 MG/DL (ref 120–199)
CHOLEST/HDLC SERPL: 5 {RATIO} (ref 2–5)
ESTIMATED AVG GLUCOSE: 126 MG/DL (ref 68–131)
HBA1C MFR BLD: 6 % (ref 4–5.6)
HDLC SERPL-MCNC: 48 MG/DL (ref 40–75)
HDLC SERPL: 20 % (ref 20–50)
LDLC SERPL CALC-MCNC: 155.4 MG/DL (ref 63–159)
NONHDLC SERPL-MCNC: 192 MG/DL
TRIGL SERPL-MCNC: 183 MG/DL (ref 30–150)

## 2022-08-12 PROCEDURE — 80061 LIPID PANEL: CPT | Performed by: INTERNAL MEDICINE

## 2022-08-12 PROCEDURE — 83036 HEMOGLOBIN GLYCOSYLATED A1C: CPT | Performed by: INTERNAL MEDICINE

## 2022-08-12 PROCEDURE — 36415 COLL VENOUS BLD VENIPUNCTURE: CPT | Performed by: INTERNAL MEDICINE

## 2022-09-06 ENCOUNTER — PATIENT MESSAGE (OUTPATIENT)
Dept: OTHER | Facility: OTHER | Age: 57
End: 2022-09-06
Payer: COMMERCIAL

## 2022-10-10 ENCOUNTER — PATIENT MESSAGE (OUTPATIENT)
Dept: OTHER | Facility: OTHER | Age: 57
End: 2022-10-10
Payer: COMMERCIAL

## 2022-10-18 ENCOUNTER — PATIENT MESSAGE (OUTPATIENT)
Dept: ADMINISTRATIVE | Facility: HOSPITAL | Age: 57
End: 2022-10-18
Payer: COMMERCIAL

## 2022-10-26 ENCOUNTER — LAB VISIT (OUTPATIENT)
Dept: LAB | Facility: HOSPITAL | Age: 57
End: 2022-10-26
Attending: INTERNAL MEDICINE
Payer: COMMERCIAL

## 2022-10-26 DIAGNOSIS — E78.5 HYPERLIPIDEMIA LDL GOAL <70: ICD-10-CM

## 2022-10-26 DIAGNOSIS — E11.9 CONTROLLED TYPE 2 DIABETES MELLITUS WITHOUT COMPLICATION, WITHOUT LONG-TERM CURRENT USE OF INSULIN: ICD-10-CM

## 2022-10-26 DIAGNOSIS — I10 HYPERTENSION GOAL BP (BLOOD PRESSURE) < 130/80: Chronic | ICD-10-CM

## 2022-10-26 LAB
ALBUMIN SERPL BCP-MCNC: 3.6 G/DL (ref 3.5–5.2)
ALP SERPL-CCNC: 84 U/L (ref 55–135)
ALT SERPL W/O P-5'-P-CCNC: 14 U/L (ref 10–44)
ANION GAP SERPL CALC-SCNC: 7 MMOL/L (ref 8–16)
AST SERPL-CCNC: 17 U/L (ref 10–40)
BILIRUB SERPL-MCNC: 0.4 MG/DL (ref 0.1–1)
BUN SERPL-MCNC: 9 MG/DL (ref 6–20)
CALCIUM SERPL-MCNC: 9.5 MG/DL (ref 8.7–10.5)
CHLORIDE SERPL-SCNC: 111 MMOL/L (ref 95–110)
CHOLEST SERPL-MCNC: 223 MG/DL (ref 120–199)
CHOLEST/HDLC SERPL: 4.8 {RATIO} (ref 2–5)
CO2 SERPL-SCNC: 24 MMOL/L (ref 23–29)
CREAT SERPL-MCNC: 0.9 MG/DL (ref 0.5–1.4)
EST. GFR  (NO RACE VARIABLE): >60 ML/MIN/1.73 M^2
ESTIMATED AVG GLUCOSE: 123 MG/DL (ref 68–131)
GLUCOSE SERPL-MCNC: 117 MG/DL (ref 70–110)
HBA1C MFR BLD: 5.9 % (ref 4–5.6)
HDLC SERPL-MCNC: 46 MG/DL (ref 40–75)
HDLC SERPL: 20.6 % (ref 20–50)
LDLC SERPL CALC-MCNC: 139.6 MG/DL (ref 63–159)
NONHDLC SERPL-MCNC: 177 MG/DL
POTASSIUM SERPL-SCNC: 4.3 MMOL/L (ref 3.5–5.1)
PROT SERPL-MCNC: 7 G/DL (ref 6–8.4)
SODIUM SERPL-SCNC: 142 MMOL/L (ref 136–145)
TRIGL SERPL-MCNC: 187 MG/DL (ref 30–150)

## 2022-10-26 PROCEDURE — 83036 HEMOGLOBIN GLYCOSYLATED A1C: CPT | Performed by: INTERNAL MEDICINE

## 2022-10-26 PROCEDURE — 36415 COLL VENOUS BLD VENIPUNCTURE: CPT | Performed by: INTERNAL MEDICINE

## 2022-10-26 PROCEDURE — 80053 COMPREHEN METABOLIC PANEL: CPT | Performed by: INTERNAL MEDICINE

## 2022-10-26 PROCEDURE — 80061 LIPID PANEL: CPT | Performed by: INTERNAL MEDICINE

## 2022-10-28 ENCOUNTER — OFFICE VISIT (OUTPATIENT)
Dept: INTERNAL MEDICINE | Facility: CLINIC | Age: 57
End: 2022-10-28
Payer: COMMERCIAL

## 2022-10-28 VITALS
OXYGEN SATURATION: 99 % | BODY MASS INDEX: 39.08 KG/M2 | WEIGHT: 234.56 LBS | HEART RATE: 80 BPM | TEMPERATURE: 98 F | RESPIRATION RATE: 20 BRPM | DIASTOLIC BLOOD PRESSURE: 80 MMHG | HEIGHT: 65 IN | SYSTOLIC BLOOD PRESSURE: 132 MMHG

## 2022-10-28 DIAGNOSIS — N32.81 OVERACTIVE BLADDER: ICD-10-CM

## 2022-10-28 DIAGNOSIS — Z00.00 ANNUAL PHYSICAL EXAM: Primary | ICD-10-CM

## 2022-10-28 DIAGNOSIS — E78.5 HYPERLIPIDEMIA LDL GOAL <70: ICD-10-CM

## 2022-10-28 DIAGNOSIS — Z23 IMMUNIZATION DUE: ICD-10-CM

## 2022-10-28 DIAGNOSIS — I10 HYPERTENSION GOAL BP (BLOOD PRESSURE) < 130/80: ICD-10-CM

## 2022-10-28 DIAGNOSIS — E11.9 CONTROLLED TYPE 2 DIABETES MELLITUS WITHOUT COMPLICATION, WITHOUT LONG-TERM CURRENT USE OF INSULIN: ICD-10-CM

## 2022-10-28 LAB
BILIRUB UR QL STRIP: NEGATIVE
CLARITY UR REFRACT.AUTO: ABNORMAL
COLOR UR AUTO: YELLOW
GLUCOSE UR QL STRIP: NEGATIVE
HGB UR QL STRIP: NEGATIVE
KETONES UR QL STRIP: NEGATIVE
LEUKOCYTE ESTERASE UR QL STRIP: NEGATIVE
NITRITE UR QL STRIP: NEGATIVE
PH UR STRIP: 7 [PH] (ref 5–8)
PROT UR QL STRIP: ABNORMAL
SP GR UR STRIP: 1.02 (ref 1–1.03)
URN SPEC COLLECT METH UR: ABNORMAL

## 2022-10-28 PROCEDURE — 3072F LOW RISK FOR RETINOPATHY: CPT | Mod: CPTII,S$GLB,, | Performed by: INTERNAL MEDICINE

## 2022-10-28 PROCEDURE — 99213 PR OFFICE/OUTPT VISIT, EST, LEVL III, 20-29 MIN: ICD-10-PCS | Mod: 25,S$GLB,, | Performed by: INTERNAL MEDICINE

## 2022-10-28 PROCEDURE — 3044F PR MOST RECENT HEMOGLOBIN A1C LEVEL <7.0%: ICD-10-PCS | Mod: CPTII,S$GLB,, | Performed by: INTERNAL MEDICINE

## 2022-10-28 PROCEDURE — 99396 PR PREVENTIVE VISIT,EST,40-64: ICD-10-PCS | Mod: 25,S$GLB,, | Performed by: INTERNAL MEDICINE

## 2022-10-28 PROCEDURE — 3061F NEG MICROALBUMINURIA REV: CPT | Mod: CPTII,S$GLB,, | Performed by: INTERNAL MEDICINE

## 2022-10-28 PROCEDURE — 3079F DIAST BP 80-89 MM HG: CPT | Mod: CPTII,S$GLB,, | Performed by: INTERNAL MEDICINE

## 2022-10-28 PROCEDURE — 1160F RVW MEDS BY RX/DR IN RCRD: CPT | Mod: CPTII,S$GLB,, | Performed by: INTERNAL MEDICINE

## 2022-10-28 PROCEDURE — 81003 URINALYSIS AUTO W/O SCOPE: CPT | Performed by: INTERNAL MEDICINE

## 2022-10-28 PROCEDURE — 99396 PREV VISIT EST AGE 40-64: CPT | Mod: 25,S$GLB,, | Performed by: INTERNAL MEDICINE

## 2022-10-28 PROCEDURE — 99999 PR PBB SHADOW E&M-EST. PATIENT-LVL V: ICD-10-PCS | Mod: PBBFAC,,, | Performed by: INTERNAL MEDICINE

## 2022-10-28 PROCEDURE — 90686 IIV4 VACC NO PRSV 0.5 ML IM: CPT | Mod: S$GLB,,, | Performed by: INTERNAL MEDICINE

## 2022-10-28 PROCEDURE — 90677 PCV20 VACCINE IM: CPT | Mod: S$GLB,,, | Performed by: INTERNAL MEDICINE

## 2022-10-28 PROCEDURE — 3075F PR MOST RECENT SYSTOLIC BLOOD PRESS GE 130-139MM HG: ICD-10-PCS | Mod: CPTII,S$GLB,, | Performed by: INTERNAL MEDICINE

## 2022-10-28 PROCEDURE — 90472 IMMUNIZATION ADMIN EACH ADD: CPT | Mod: S$GLB,,, | Performed by: INTERNAL MEDICINE

## 2022-10-28 PROCEDURE — 90472 PNEUMOCOCCAL CONJUGATE VACCINE 20-VALENT: ICD-10-PCS | Mod: S$GLB,,, | Performed by: INTERNAL MEDICINE

## 2022-10-28 PROCEDURE — 90471 FLU VACCINE (QUAD) GREATER THAN OR EQUAL TO 3YO PRESERVATIVE FREE IM: ICD-10-PCS | Mod: S$GLB,,, | Performed by: INTERNAL MEDICINE

## 2022-10-28 PROCEDURE — 1160F PR REVIEW ALL MEDS BY PRESCRIBER/CLIN PHARMACIST DOCUMENTED: ICD-10-PCS | Mod: CPTII,S$GLB,, | Performed by: INTERNAL MEDICINE

## 2022-10-28 PROCEDURE — 90686 FLU VACCINE (QUAD) GREATER THAN OR EQUAL TO 3YO PRESERVATIVE FREE IM: ICD-10-PCS | Mod: S$GLB,,, | Performed by: INTERNAL MEDICINE

## 2022-10-28 PROCEDURE — 90471 IMMUNIZATION ADMIN: CPT | Mod: S$GLB,,, | Performed by: INTERNAL MEDICINE

## 2022-10-28 PROCEDURE — 3061F PR NEG MICROALBUMINURIA RESULT DOCUMENTED/REVIEW: ICD-10-PCS | Mod: CPTII,S$GLB,, | Performed by: INTERNAL MEDICINE

## 2022-10-28 PROCEDURE — 3044F HG A1C LEVEL LT 7.0%: CPT | Mod: CPTII,S$GLB,, | Performed by: INTERNAL MEDICINE

## 2022-10-28 PROCEDURE — 99999 PR PBB SHADOW E&M-EST. PATIENT-LVL V: CPT | Mod: PBBFAC,,, | Performed by: INTERNAL MEDICINE

## 2022-10-28 PROCEDURE — 90677 PNEUMOCOCCAL CONJUGATE VACCINE 20-VALENT: ICD-10-PCS | Mod: S$GLB,,, | Performed by: INTERNAL MEDICINE

## 2022-10-28 PROCEDURE — 3072F PR LOW RISK FOR RETINOPATHY: ICD-10-PCS | Mod: CPTII,S$GLB,, | Performed by: INTERNAL MEDICINE

## 2022-10-28 PROCEDURE — 1159F PR MEDICATION LIST DOCUMENTED IN MEDICAL RECORD: ICD-10-PCS | Mod: CPTII,S$GLB,, | Performed by: INTERNAL MEDICINE

## 2022-10-28 PROCEDURE — 3079F PR MOST RECENT DIASTOLIC BLOOD PRESSURE 80-89 MM HG: ICD-10-PCS | Mod: CPTII,S$GLB,, | Performed by: INTERNAL MEDICINE

## 2022-10-28 PROCEDURE — 3075F SYST BP GE 130 - 139MM HG: CPT | Mod: CPTII,S$GLB,, | Performed by: INTERNAL MEDICINE

## 2022-10-28 PROCEDURE — 99213 OFFICE O/P EST LOW 20 MIN: CPT | Mod: 25,S$GLB,, | Performed by: INTERNAL MEDICINE

## 2022-10-28 PROCEDURE — 3066F PR DOCUMENTATION OF TREATMENT FOR NEPHROPATHY: ICD-10-PCS | Mod: CPTII,S$GLB,, | Performed by: INTERNAL MEDICINE

## 2022-10-28 PROCEDURE — 1159F MED LIST DOCD IN RCRD: CPT | Mod: CPTII,S$GLB,, | Performed by: INTERNAL MEDICINE

## 2022-10-28 PROCEDURE — 3066F NEPHROPATHY DOC TX: CPT | Mod: CPTII,S$GLB,, | Performed by: INTERNAL MEDICINE

## 2022-10-28 RX ORDER — OXYBUTYNIN CHLORIDE 5 MG/1
5 TABLET ORAL 2 TIMES DAILY
Qty: 60 TABLET | Refills: 5 | Status: SHIPPED | OUTPATIENT
Start: 2022-10-28 | End: 2023-11-05

## 2022-10-28 RX ORDER — ROSUVASTATIN CALCIUM 40 MG/1
40 TABLET, COATED ORAL NIGHTLY
Qty: 30 TABLET | Refills: 5 | Status: SHIPPED | OUTPATIENT
Start: 2022-10-28 | End: 2023-01-18 | Stop reason: SDUPTHER

## 2022-10-28 NOTE — PROGRESS NOTES
Leydi Victoria Trinidad  56 y.o. Black or  female  [id number]    Chief Complaint:  Chief Complaint   Patient presents with    Annual Exam       History of Present Illness:  Presents for an annual exam.   HLD--slightly improved but remains above goal. She is taking pravastatin.   Lab Results   Component Value Date    LDLCALC 139.6 10/26/2022     HTN--stable.   DM--well controlled.  Laboratory   Lab Results   Component Value Date    WBC 6.90 04/28/2022    HGB 12.5 04/28/2022    HCT 40.9 04/28/2022     04/28/2022    CHOL 223 (H) 10/26/2022    TRIG 187 (H) 10/26/2022    HDL 46 10/26/2022    ALT 14 10/26/2022    AST 17 10/26/2022     10/26/2022    K 4.3 10/26/2022     (H) 10/26/2022    CREATININE 0.9 10/26/2022    BUN 9 10/26/2022    CO2 24 10/26/2022    TSH 1.493 04/04/2018    HGBA1C 5.9 (H) 10/26/2022     She is having nocturia and urgency. She stops drinking fluids around 8 pm. She denies dysuria. She is having a lot of accidents at night.     Review of Systems   Constitutional:  Negative for fever.   Eyes:  Negative for blurred vision.   Respiratory:  Negative for shortness of breath.    Cardiovascular:  Negative for chest pain and leg swelling.   Gastrointestinal:  Negative for abdominal pain.   Genitourinary:  Positive for frequency and urgency. Negative for dysuria.   Neurological:  Negative for dizziness and headaches.     The following were reviewed: Active problem list, medication list, allergies, family history, social history, and Health Maintenance.     History:  Past Medical History:   Diagnosis Date    Allergic rhinitis     Anxiety     Asthma     Diabetes mellitus, type 2     Hyperlipidemia     Hypertension     Personal history of colonic polyps 07/24/2014    Sarcoidosis      Past Surgical History:   Procedure Laterality Date    COLONOSCOPY N/A 7/19/2022    Procedure: COLONOSCOPY;  Surgeon: Elisabeth Rodriguez MD;  Location: [facility];  Service: Endoscopy;  Laterality: N/A;     FOOT SURGERY  at 11y/o    arch lift    INJECTION OF ANESTHETIC AGENT AROUND MEDIAL BRANCH NERVES INNERVATING LUMBAR FACET JOINT Bilateral 7/28/2022    Procedure: diagnostic bilateral L3-5 MBB;  Surgeon: Nick Kruger MD;  Location: [facility];  Service: Pain Management;  Laterality: Bilateral;    INJECTION OF ANESTHETIC AGENT INTO SACROILIAC JOINT Bilateral 6/16/2022    Procedure: Bilateral SIJ Injection;  Surgeon: Sebastien Cameron MD;  Location: [facility];  Service: Pain Management;  Laterality: Bilateral;     Family History   Problem Relation Age of Onset    Diabetes Mother     Hypertension Mother     Hypertension Father     Hyperlipidemia Father     Breast cancer Sister 58    Colon cancer Neg Hx     Ovarian cancer Neg Hx      Social History     Socioeconomic History    Marital status:     Number of children: 1   Occupational History     Employer: [employer]    Tobacco Use    Smoking status: Never    Smokeless tobacco: Never   Substance and Sexual Activity    Alcohol use: No     Alcohol/week: 0.0 standard drinks    Drug use: No    Sexual activity: Yes     Partners: Male     Birth control/protection: Post-menopausal     Patient Active Problem List   Diagnosis    Hyperlipidemia LDL goal <70    Hypertension goal BP (blood pressure) < 130/80    Obesity (BMI 30-39.9)    Type 2 diabetes mellitus without complication, without long-term current use of insulin    Mild intermittent asthma without complication     Review of patient's allergies indicates:   Allergen Reactions    Lipitor [atorvastatin]      Other reaction(s): Muscle pain       Health Maintenance  Health Maintenance Topics with due status: Not Due       Topic Last Completion Date    Cervical Cancer Screening 11/11/2019    Diabetes Urine Screening 04/28/2022    Foot Exam 04/28/2022    Mammogram 05/20/2022    Colorectal Cancer Screening 07/19/2022    Lipid Panel 10/26/2022    Hemoglobin A1c 10/26/2022    Low Dose Statin 10/28/2022      Health Maintenance Due   Topic Date Due    TETANUS VACCINE  05/12/2021    COVID-19 Vaccine (4 - Booster for Moderna series) 01/04/2022    Eye Exam  09/23/2022       Medications:  Current Outpatient Medications on File Prior to Visit   Medication Sig Dispense Refill    albuterol-ipratropium (DUO-NEB) 2.5 mg-0.5 mg/3 mL nebulizer solution Take 3 mLs by nebulization every 6 (six) hours as needed for Wheezing. Rescue 1 Box 3    amLODIPine (NORVASC) 5 MG tablet TAKE 1 TABLET(5 MG) BY MOUTH EVERY DAY 90 tablet 1    blood sugar diagnostic Strp Once daily glucose testing. 50 strip 6    diclofenac sodium (VOLTAREN) 1 % Gel Apply 2 g topically 4 (four) times daily. for 10 days 100 g 0    DULoxetine (CYMBALTA) 60 MG capsule TAKE 1 CAPSULE(60 MG) BY MOUTH EVERY DAY 90 capsule 1    fluticasone propionate (FLONASE) 50 mcg/actuation nasal spray 2 sprays (100 mcg total) by Each Nostril route once daily. 9.9 mL 0    ipratropium (ATROVENT) 0.02 % nebulizer solution Take 2.5 mLs (500 mcg total) by nebulization once. Rescue for 1 dose 1 vial 0    lancets (LANCETS,THIN) Misc Once daily glucose testing. 50 each 6    meclizine (ANTIVERT) 25 mg tablet Take 1 tablet (25 mg total) by mouth 3 (three) times daily as needed for Dizziness. 30 tablet 0    montelukast (SINGULAIR) 10 mg tablet Take 1 tablet (10 mg total) by mouth every evening. 90 tablet 1    polymyxin B sulf-trimethoprim (POLYTRIM) 10,000 unit- 1 mg/mL Drop Place 1 drop into both eyes every 6 (six) hours. 10 mL 0    potassium chloride SA (K-DUR,KLOR-CON) 10 MEQ tablet TAKE 1 TABLET BY MOUTH EVERY DAY 90 tablet 3    pregabalin (LYRICA) 75 MG capsule Take 1 capsule (75 mg total) by mouth 2 (two) times daily. 60 capsule 1    TRUE METRIX AIR GLUCOSE METER [facility] USE AS DIRECTED TO TEST BLOOD SUGAR ONCE DAILY 1 each 0    TRULICITY 3 mg/0.5 mL pen injector INJECT 3 MG UNDER THE SKIN EVERY 7 DAYS. 12 pen 0    [DISCONTINUED] pravastatin (PRAVACHOL) 80 MG tablet Take 1 tablet (80 mg  total) by mouth every evening. 90 tablet 1     No current facility-administered medications on file prior to visit.       Medications have been reviewed and reconciled with patient at visit today.    Exam:  Vitals:    10/28/22 0752   BP: 132/80   Pulse: 80   Resp: 20   Temp: 97.8 °F (36.6 °C)     Weight: 106.4 kg (234 lb 9.1 oz)   Body mass index is 39.03 kg/m².      Physical Exam  Vitals reviewed.   Constitutional:       General: She is not in acute distress.     Appearance: She is well-developed. She is not ill-appearing.   Eyes:      General: No scleral icterus.  Cardiovascular:      Rate and Rhythm: Normal rate and regular rhythm.      Heart sounds: Normal heart sounds.   Pulmonary:      Effort: Pulmonary effort is normal. No respiratory distress.      Breath sounds: Normal breath sounds. No wheezing or rales.   Abdominal:      General: Bowel sounds are normal.      Palpations: Abdomen is soft.   Skin:     General: Skin is warm and dry.   Neurological:      Mental Status: She is alert and oriented to person, place, and time.   Psychiatric:         Behavior: Behavior normal.     Assessment:  The primary encounter diagnosis was Annual physical exam. Diagnoses of Hyperlipidemia LDL goal <70, Hypertension goal BP (blood pressure) < 130/80, Controlled type 2 diabetes mellitus without complication, without long-term current use of insulin, Overactive bladder, and Immunization due were also pertinent to this visit.    Plan:  Annual physical exam  -     Counseled regarding age appropriate screenings and immunizations  -     Counseled regarding lifestyle modifications    Hyperlipidemia LDL goal <70  -     change to rosuvastatin (CRESTOR) 40 MG Tab; Take 1 tablet (40 mg total) by mouth every evening.  Dispense: 30 tablet; Refill: 5  -     d/c pravastatin  -     Lifestyle modifications discussed    Hypertension goal BP (blood pressure) < 130/80  -     continue amlodipine    Controlled type 2 diabetes mellitus without  complication, without long-term current use of insulin  -     continue Trulicity  -     recommend diabetic eye exam    Overactive bladder  -     oxybutynin (DITROPAN) 5 MG Tab; Take 1 tablet (5 mg total) by mouth 2 (two) times daily.  Dispense: 60 tablet; Refill: 5. Discussed medication risks and benefits.   -     Urinalysis    Immunization due  -     Influenza - Quadrivalent (PF)  -     (In Office Administered) Pneumococcal Conjugate Vaccine (20 Valent) (IM)      Follow up in about 6 months (around 4/28/2023).

## 2022-11-02 ENCOUNTER — TELEPHONE (OUTPATIENT)
Dept: INTERNAL MEDICINE | Facility: CLINIC | Age: 57
End: 2022-11-02
Payer: COMMERCIAL

## 2022-11-02 DIAGNOSIS — E78.2 MIXED HYPERLIPIDEMIA: ICD-10-CM

## 2022-11-02 DIAGNOSIS — E11.9 CONTROLLED TYPE 2 DIABETES MELLITUS WITHOUT COMPLICATION, WITHOUT LONG-TERM CURRENT USE OF INSULIN: Primary | ICD-10-CM

## 2022-11-02 NOTE — TELEPHONE ENCOUNTER
----- Message from Miranda Chavez DO sent at 11/1/2022  3:12 PM CDT -----  Schedule A1C, CMP, lipid panel and f/u with Toña in 6 months.

## 2022-12-19 DIAGNOSIS — J32.9 BACTERIAL SINUSITIS: ICD-10-CM

## 2022-12-19 DIAGNOSIS — B96.89 BACTERIAL SINUSITIS: ICD-10-CM

## 2022-12-19 RX ORDER — FLUTICASONE PROPIONATE 50 MCG
2 SPRAY, SUSPENSION (ML) NASAL DAILY
Qty: 9.9 ML | Refills: 0 | Status: SHIPPED | OUTPATIENT
Start: 2022-12-19 | End: 2022-12-19 | Stop reason: SDUPTHER

## 2022-12-19 NOTE — TELEPHONE ENCOUNTER
No new care gaps identified.  [facility] Embedded Care Gaps. Reference number: [id number]. 12/19/2022   4:06:38 PM CST

## 2022-12-19 NOTE — TELEPHONE ENCOUNTER
No new care gaps identified.  [facility] Embedded Care Gaps. Reference number: [id number]. 12/19/2022   11:32:25 AM CST

## 2022-12-20 RX ORDER — FLUTICASONE PROPIONATE 50 MCG
2 SPRAY, SUSPENSION (ML) NASAL DAILY
Qty: 9.9 ML | Refills: 1 | Status: SHIPPED | OUTPATIENT
Start: 2022-12-20

## 2022-12-20 NOTE — TELEPHONE ENCOUNTER
"Refill Routing Note   Medication(s) are not appropriate for processing by [facility] for the following reason(s):      - Pharmacy settings for patient, "Prefers 90 day supplies", automated messages will come in for any orders less than 90 days    [facility] action(s):  Defer          Medication reconciliation completed: No     Appointments  past 12m or future 3m with PCP    Date Provider   Last Visit   10/28/2022 Miranda Chavez, DO   Next Visit   Visit date not found Miranda Chavez, DO   ED visits in past 90 days: 0        Note composed:1:40 PM 12/20/2022           "

## 2023-01-17 DIAGNOSIS — E78.5 HYPERLIPIDEMIA LDL GOAL <70: ICD-10-CM

## 2023-01-17 NOTE — TELEPHONE ENCOUNTER
No new care gaps identified.  [facility] Embedded Care Gaps. Reference number: [id number]. 1/17/2023   2:48:08 PM CST

## 2023-01-18 RX ORDER — PRAVASTATIN SODIUM 80 MG/1
80 TABLET ORAL DAILY
Qty: 90 TABLET | Refills: 3 | OUTPATIENT
Start: 2023-01-18 | End: 2024-01-18

## 2023-01-18 RX ORDER — ROSUVASTATIN CALCIUM 40 MG/1
40 TABLET, COATED ORAL NIGHTLY
Qty: 90 TABLET | Refills: 2 | Status: SHIPPED | OUTPATIENT
Start: 2023-01-18 | End: 2023-11-14

## 2023-01-18 NOTE — TELEPHONE ENCOUNTER
Refill Routing Note   Medication(s) are not appropriate for processing by [facility] for the following reason(s):      - Medication not active on medication list    [facility] action(s):  Defer          Medication reconciliation completed: No     Appointments  past 12m or future 3m with PCP    Date Provider   Last Visit   10/28/2022 Miranda Chavez DO   Next Visit   Visit date not found Miranda Chavez DO   ED visits in past 90 days: 0        Note composed:5:26 AM 01/18/2023

## 2023-05-01 ENCOUNTER — PATIENT OUTREACH (OUTPATIENT)
Dept: ADMINISTRATIVE | Facility: HOSPITAL | Age: 58
End: 2023-05-01
Payer: COMMERCIAL

## 2023-05-01 NOTE — PROGRESS NOTES
Working Diabetes Lab Report.    Pt has lab appt scheduled, 5/02/23.  All needed labs scheduled.

## 2023-05-05 ENCOUNTER — LAB VISIT (OUTPATIENT)
Dept: LAB | Facility: HOSPITAL | Age: 58
End: 2023-05-05
Attending: INTERNAL MEDICINE
Payer: COMMERCIAL

## 2023-05-05 DIAGNOSIS — E11.9 CONTROLLED TYPE 2 DIABETES MELLITUS WITHOUT COMPLICATION, WITHOUT LONG-TERM CURRENT USE OF INSULIN: ICD-10-CM

## 2023-05-05 DIAGNOSIS — E78.2 MIXED HYPERLIPIDEMIA: ICD-10-CM

## 2023-05-05 LAB
ALBUMIN SERPL BCP-MCNC: 4 G/DL (ref 3.5–5.2)
ALP SERPL-CCNC: 91 U/L (ref 55–135)
ALT SERPL W/O P-5'-P-CCNC: 21 U/L (ref 10–44)
ANION GAP SERPL CALC-SCNC: 12 MMOL/L (ref 8–16)
AST SERPL-CCNC: 19 U/L (ref 10–40)
BILIRUB SERPL-MCNC: 0.6 MG/DL (ref 0.1–1)
BUN SERPL-MCNC: 9 MG/DL (ref 6–20)
CALCIUM SERPL-MCNC: 9.6 MG/DL (ref 8.7–10.5)
CHLORIDE SERPL-SCNC: 110 MMOL/L (ref 95–110)
CHOLEST SERPL-MCNC: 183 MG/DL (ref 120–199)
CHOLEST/HDLC SERPL: 3.5 {RATIO} (ref 2–5)
CO2 SERPL-SCNC: 22 MMOL/L (ref 23–29)
CREAT SERPL-MCNC: 1 MG/DL (ref 0.5–1.4)
EST. GFR  (NO RACE VARIABLE): >60 ML/MIN/1.73 M^2
ESTIMATED AVG GLUCOSE: 134 MG/DL (ref 68–131)
GLUCOSE SERPL-MCNC: 150 MG/DL (ref 70–110)
HBA1C MFR BLD: 6.3 % (ref 4–5.6)
HDLC SERPL-MCNC: 52 MG/DL (ref 40–75)
HDLC SERPL: 28.4 % (ref 20–50)
LDLC SERPL CALC-MCNC: 104 MG/DL (ref 63–159)
NONHDLC SERPL-MCNC: 131 MG/DL
POTASSIUM SERPL-SCNC: 4.2 MMOL/L (ref 3.5–5.1)
PROT SERPL-MCNC: 6.9 G/DL (ref 6–8.4)
SODIUM SERPL-SCNC: 144 MMOL/L (ref 136–145)
TRIGL SERPL-MCNC: 135 MG/DL (ref 30–150)

## 2023-05-05 PROCEDURE — 80061 LIPID PANEL: CPT | Performed by: INTERNAL MEDICINE

## 2023-05-05 PROCEDURE — 83036 HEMOGLOBIN GLYCOSYLATED A1C: CPT | Performed by: INTERNAL MEDICINE

## 2023-05-05 PROCEDURE — 80053 COMPREHEN METABOLIC PANEL: CPT | Performed by: INTERNAL MEDICINE

## 2023-05-05 PROCEDURE — 36415 COLL VENOUS BLD VENIPUNCTURE: CPT | Performed by: INTERNAL MEDICINE

## 2023-05-10 ENCOUNTER — TELEPHONE (OUTPATIENT)
Dept: PODIATRY | Facility: CLINIC | Age: 58
End: 2023-05-10
Payer: COMMERCIAL

## 2023-06-05 ENCOUNTER — OFFICE VISIT (OUTPATIENT)
Dept: INTERNAL MEDICINE | Facility: CLINIC | Age: 58
End: 2023-06-05
Payer: COMMERCIAL

## 2023-06-05 VITALS
DIASTOLIC BLOOD PRESSURE: 72 MMHG | TEMPERATURE: 97 F | HEIGHT: 65 IN | OXYGEN SATURATION: 96 % | SYSTOLIC BLOOD PRESSURE: 114 MMHG | RESPIRATION RATE: 19 BRPM | HEART RATE: 87 BPM | BODY MASS INDEX: 39.49 KG/M2 | WEIGHT: 237 LBS

## 2023-06-05 DIAGNOSIS — E78.5 HYPERLIPIDEMIA LDL GOAL <70: Primary | Chronic | ICD-10-CM

## 2023-06-05 DIAGNOSIS — Z12.31 ENCOUNTER FOR SCREENING MAMMOGRAM FOR MALIGNANT NEOPLASM OF BREAST: ICD-10-CM

## 2023-06-05 DIAGNOSIS — I10 HYPERTENSION GOAL BP (BLOOD PRESSURE) < 130/80: Chronic | ICD-10-CM

## 2023-06-05 DIAGNOSIS — E11.9 TYPE 2 DIABETES MELLITUS WITHOUT COMPLICATION, WITHOUT LONG-TERM CURRENT USE OF INSULIN: ICD-10-CM

## 2023-06-05 DIAGNOSIS — M72.2 PLANTAR FASCIITIS: ICD-10-CM

## 2023-06-05 LAB
ALBUMIN/CREAT UR: 6.6 UG/MG (ref 0–30)
CREAT UR-MCNC: 182 MG/DL (ref 15–325)
MICROALBUMIN UR DL<=1MG/L-MCNC: 12 UG/ML

## 2023-06-05 PROCEDURE — 99999 PR PBB SHADOW E&M-EST. PATIENT-LVL V: ICD-10-PCS | Mod: PBBFAC,,, | Performed by: PHYSICIAN ASSISTANT

## 2023-06-05 PROCEDURE — 3044F HG A1C LEVEL LT 7.0%: CPT | Mod: CPTII,S$GLB,, | Performed by: PHYSICIAN ASSISTANT

## 2023-06-05 PROCEDURE — 1160F RVW MEDS BY RX/DR IN RCRD: CPT | Mod: CPTII,S$GLB,, | Performed by: PHYSICIAN ASSISTANT

## 2023-06-05 PROCEDURE — 3078F PR MOST RECENT DIASTOLIC BLOOD PRESSURE < 80 MM HG: ICD-10-PCS | Mod: CPTII,S$GLB,, | Performed by: PHYSICIAN ASSISTANT

## 2023-06-05 PROCEDURE — 3078F DIAST BP <80 MM HG: CPT | Mod: CPTII,S$GLB,, | Performed by: PHYSICIAN ASSISTANT

## 2023-06-05 PROCEDURE — 1159F MED LIST DOCD IN RCRD: CPT | Mod: CPTII,S$GLB,, | Performed by: PHYSICIAN ASSISTANT

## 2023-06-05 PROCEDURE — 3008F BODY MASS INDEX DOCD: CPT | Mod: CPTII,S$GLB,, | Performed by: PHYSICIAN ASSISTANT

## 2023-06-05 PROCEDURE — 3008F PR BODY MASS INDEX (BMI) DOCUMENTED: ICD-10-PCS | Mod: CPTII,S$GLB,, | Performed by: PHYSICIAN ASSISTANT

## 2023-06-05 PROCEDURE — 99214 PR OFFICE/OUTPT VISIT, EST, LEVL IV, 30-39 MIN: ICD-10-PCS | Mod: S$GLB,,, | Performed by: PHYSICIAN ASSISTANT

## 2023-06-05 PROCEDURE — 3074F SYST BP LT 130 MM HG: CPT | Mod: CPTII,S$GLB,, | Performed by: PHYSICIAN ASSISTANT

## 2023-06-05 PROCEDURE — 3044F PR MOST RECENT HEMOGLOBIN A1C LEVEL <7.0%: ICD-10-PCS | Mod: CPTII,S$GLB,, | Performed by: PHYSICIAN ASSISTANT

## 2023-06-05 PROCEDURE — 1160F PR REVIEW ALL MEDS BY PRESCRIBER/CLIN PHARMACIST DOCUMENTED: ICD-10-PCS | Mod: CPTII,S$GLB,, | Performed by: PHYSICIAN ASSISTANT

## 2023-06-05 PROCEDURE — 99214 OFFICE O/P EST MOD 30 MIN: CPT | Mod: S$GLB,,, | Performed by: PHYSICIAN ASSISTANT

## 2023-06-05 PROCEDURE — 99999 PR PBB SHADOW E&M-EST. PATIENT-LVL V: CPT | Mod: PBBFAC,,, | Performed by: PHYSICIAN ASSISTANT

## 2023-06-05 PROCEDURE — 3074F PR MOST RECENT SYSTOLIC BLOOD PRESSURE < 130 MM HG: ICD-10-PCS | Mod: CPTII,S$GLB,, | Performed by: PHYSICIAN ASSISTANT

## 2023-06-05 PROCEDURE — 82570 ASSAY OF URINE CREATININE: CPT | Performed by: PHYSICIAN ASSISTANT

## 2023-06-05 PROCEDURE — 1159F PR MEDICATION LIST DOCUMENTED IN MEDICAL RECORD: ICD-10-PCS | Mod: CPTII,S$GLB,, | Performed by: PHYSICIAN ASSISTANT

## 2023-06-05 RX ORDER — METHOCARBAMOL 750 MG/1
TABLET, FILM COATED ORAL
COMMUNITY
Start: 2022-03-11 | End: 2023-06-23

## 2023-06-05 RX ORDER — KETOCONAZOLE 20 MG/ML
SHAMPOO, SUSPENSION TOPICAL
COMMUNITY
Start: 2023-05-19

## 2023-06-05 RX ORDER — MELOXICAM 7.5 MG/1
7.5 TABLET ORAL DAILY
Qty: 15 TABLET | Refills: 0 | Status: SHIPPED | OUTPATIENT
Start: 2023-06-05

## 2023-06-05 RX ORDER — TIRZEPATIDE 5 MG/.5ML
5 INJECTION, SOLUTION SUBCUTANEOUS
Qty: 4 PEN | Refills: 1 | Status: SHIPPED | OUTPATIENT
Start: 2023-06-05 | End: 2023-06-29 | Stop reason: SDUPTHER

## 2023-06-05 NOTE — PROGRESS NOTES
"Subjective:      Patient ID: Leydi Quintero is a 57 y.o. female.    Chief Complaint: 6 Month Follow-Up    Patient is known to me, being seen today for 6mth f/u.     HTN- amlodipine 5mg, controlled   HLD- on statin therapy   DM- A1c 6.3%, mounjaro 2.5mg   Started mounjaro early May, tolerating well, eating better, feels lighter     Labs recently completed, diabetes slightly worse, otherwise stable     Last visit October 2022 with PCP.     Review of Systems   Constitutional:  Negative for chills, diaphoresis and fever.   HENT:  Negative for congestion, rhinorrhea and sore throat.    Respiratory:  Negative for cough, shortness of breath and wheezing.    Gastrointestinal:  Negative for abdominal pain, constipation, diarrhea, nausea and vomiting.   Musculoskeletal:  Positive for back pain (R sided, sacroilitis, followd by Pain Med).   Skin:  Negative for rash.   Neurological:  Negative for dizziness, light-headedness and headaches.     Objective:   /72   Pulse 87   Temp 96.9 °F (36.1 °C)   Resp 19   Ht 5' 5" (1.651 m)   Wt 107.5 kg (236 lb 15.9 oz)   LMP 11/23/2013   SpO2 96%   BMI 39.44 kg/m²   Physical Exam  Constitutional:       General: She is not in acute distress.     Appearance: Normal appearance. She is well-developed. She is not ill-appearing.   HENT:      Head: Normocephalic and atraumatic.   Cardiovascular:      Rate and Rhythm: Normal rate and regular rhythm.      Pulses:           Dorsalis pedis pulses are 2+ on the right side and 2+ on the left side.      Heart sounds: Normal heart sounds. No murmur heard.  Pulmonary:      Effort: Pulmonary effort is normal. No respiratory distress.      Breath sounds: Normal breath sounds. No decreased breath sounds.   Abdominal:      General: Bowel sounds are normal.      Palpations: Abdomen is soft.      Tenderness: There is no abdominal tenderness.   Musculoskeletal:      Right lower leg: No edema.      Left lower leg: No edema.        " Feet:    Feet:      Right foot:      Protective Sensation: 10 sites tested.  10 sites sensed.      Skin integrity: Skin integrity normal.      Toenail Condition: Right toenails are normal.      Left foot:      Protective Sensation: 10 sites tested.  10 sites sensed.      Skin integrity: Skin integrity normal.      Toenail Condition: Left toenails are normal.   Skin:     General: Skin is warm and dry.      Findings: No rash.   Psychiatric:         Speech: Speech normal.         Behavior: Behavior normal.         Thought Content: Thought content normal.     Assessment:      1. Hyperlipidemia LDL goal <70    2. Hypertension goal BP (blood pressure) < 130/80    3. Type 2 diabetes mellitus without complication, without long-term current use of insulin    4. Encounter for screening mammogram for malignant neoplasm of breast    5. Plantar fasciitis       Plan:   Hyperlipidemia LDL goal <70    Hypertension goal BP (blood pressure) < 130/80  -     CBC Auto Differential; Future; Expected date: 06/05/2023    Type 2 diabetes mellitus without complication, without long-term current use of insulin  -     Microalbumin/creatinine urine ratio  -     tirzepatide (MOUNJARO) 5 mg/0.5 mL PnIj; Inject 5 mg into the skin every 7 days.  Dispense: 4 pen; Refill: 1    Encounter for screening mammogram for malignant neoplasm of breast  -     Mammo Digital Screening Bilat w/ [place]; Future; Expected date: 06/05/2023    Plantar fasciitis  -     meloxicam (MOBIC) 7.5 MG tablet; Take 1 tablet (7.5 mg total) by mouth once daily. Take with a meal  Dispense: 15 tablet; Refill: 0      Discussed RICE and NSAIDs, stretches given  If no improvement, consider x-ray and f/u podiatry     Fasting labs and f/u PCP in Oct for annual     Mammo and eye exam to be scheduled     Discussed need for tetanus vaccine

## 2023-06-23 ENCOUNTER — OFFICE VISIT (OUTPATIENT)
Dept: PODIATRY | Facility: CLINIC | Age: 58
End: 2023-06-23
Payer: COMMERCIAL

## 2023-06-23 VITALS — BODY MASS INDEX: 39.49 KG/M2 | HEIGHT: 65 IN | WEIGHT: 237 LBS

## 2023-06-23 DIAGNOSIS — M24.573 EQUINUS CONTRACTURE OF ANKLE: ICD-10-CM

## 2023-06-23 DIAGNOSIS — M79.671 INFLAMMATORY HEEL PAIN, RIGHT: ICD-10-CM

## 2023-06-23 DIAGNOSIS — M72.2 PLANTAR FASCIITIS: Primary | ICD-10-CM

## 2023-06-23 PROCEDURE — 99214 OFFICE O/P EST MOD 30 MIN: CPT | Mod: 25,S$GLB,, | Performed by: PODIATRIST

## 2023-06-23 PROCEDURE — 99214 PR OFFICE/OUTPT VISIT, EST, LEVL IV, 30-39 MIN: ICD-10-PCS | Mod: 25,S$GLB,, | Performed by: PODIATRIST

## 2023-06-23 PROCEDURE — 99999 PR PBB SHADOW E&M-EST. PATIENT-LVL IV: CPT | Mod: PBBFAC,,, | Performed by: PODIATRIST

## 2023-06-23 PROCEDURE — 3066F NEPHROPATHY DOC TX: CPT | Mod: CPTII,S$GLB,, | Performed by: PODIATRIST

## 2023-06-23 PROCEDURE — 3061F PR NEG MICROALBUMINURIA RESULT DOCUMENTED/REVIEW: ICD-10-PCS | Mod: CPTII,S$GLB,, | Performed by: PODIATRIST

## 2023-06-23 PROCEDURE — 3008F BODY MASS INDEX DOCD: CPT | Mod: CPTII,S$GLB,, | Performed by: PODIATRIST

## 2023-06-23 PROCEDURE — 3061F NEG MICROALBUMINURIA REV: CPT | Mod: CPTII,S$GLB,, | Performed by: PODIATRIST

## 2023-06-23 PROCEDURE — 1160F RVW MEDS BY RX/DR IN RCRD: CPT | Mod: CPTII,S$GLB,, | Performed by: PODIATRIST

## 2023-06-23 PROCEDURE — 3008F PR BODY MASS INDEX (BMI) DOCUMENTED: ICD-10-PCS | Mod: CPTII,S$GLB,, | Performed by: PODIATRIST

## 2023-06-23 PROCEDURE — 1159F PR MEDICATION LIST DOCUMENTED IN MEDICAL RECORD: ICD-10-PCS | Mod: CPTII,S$GLB,, | Performed by: PODIATRIST

## 2023-06-23 PROCEDURE — 99999 PR PBB SHADOW E&M-EST. PATIENT-LVL IV: ICD-10-PCS | Mod: PBBFAC,,, | Performed by: PODIATRIST

## 2023-06-23 PROCEDURE — 1159F MED LIST DOCD IN RCRD: CPT | Mod: CPTII,S$GLB,, | Performed by: PODIATRIST

## 2023-06-23 PROCEDURE — 3044F HG A1C LEVEL LT 7.0%: CPT | Mod: CPTII,S$GLB,, | Performed by: PODIATRIST

## 2023-06-23 PROCEDURE — 3066F PR DOCUMENTATION OF TREATMENT FOR NEPHROPATHY: ICD-10-PCS | Mod: CPTII,S$GLB,, | Performed by: PODIATRIST

## 2023-06-23 PROCEDURE — 1160F PR REVIEW ALL MEDS BY PRESCRIBER/CLIN PHARMACIST DOCUMENTED: ICD-10-PCS | Mod: CPTII,S$GLB,, | Performed by: PODIATRIST

## 2023-06-23 PROCEDURE — 3044F PR MOST RECENT HEMOGLOBIN A1C LEVEL <7.0%: ICD-10-PCS | Mod: CPTII,S$GLB,, | Performed by: PODIATRIST

## 2023-06-23 RX ORDER — METHYLPREDNISOLONE 4 MG/1
4 TABLET ORAL DAILY
Qty: 1 EACH | Refills: 0 | Status: SHIPPED | OUTPATIENT
Start: 2023-06-23 | End: 2023-08-22 | Stop reason: SDUPTHER

## 2023-06-23 RX ORDER — METHOCARBAMOL 500 MG/1
500 TABLET, FILM COATED ORAL 4 TIMES DAILY
Qty: 40 TABLET | Refills: 0 | Status: SHIPPED | OUTPATIENT
Start: 2023-06-23 | End: 2023-07-03

## 2023-06-23 NOTE — PROGRESS NOTES
Subjective:     Patient ID: Leydi Quintero is a 57 y.o. female.    Chief Complaint: Heel Pain (Pt c/o BL heel pain 8/10, diabetic pt wears sandals, PCP Dr. Chavez lat seen 6-5-23)    Leydi is a 57 y.o. female who presents to the clinic complaining of heel pain in both feet, especially with the first step in the morning. The pain is described as Aching, Throbbing, and Tight. The onset of the pain was gradual and has worsened over the past several weeks. Leydi rates the pain as 8/10. She denies a history of trauma. Prior treatments include Mobic and Tylenol which helps a little.     Patient Active Problem List   Diagnosis    Hyperlipidemia LDL goal <70    Hypertension goal BP (blood pressure) < 130/80    Obesity (BMI 30-39.9)    Type 2 diabetes mellitus without complication, without long-term current use of insulin    Mild intermittent asthma without complication       Medication List with Changes/Refills   New Medications    METHOCARBAMOL (ROBAXIN) 500 MG TAB    Take 1 tablet (500 mg total) by mouth 4 (four) times daily. for 10 days    METHYLPREDNISOLONE (MEDROL DOSEPACK) 4 MG TABLET    Take 1 tablet (4 mg total) by mouth once daily. Use as instructed on dose pack   Current Medications    ALBUTEROL-IPRATROPIUM (DUO-NEB) 2.5 MG-0.5 MG/3 ML NEBULIZER SOLUTION    Take 3 mLs by nebulization every 6 (six) hours as needed for Wheezing. Rescue    AMLODIPINE (NORVASC) 5 MG TABLET    TAKE 1 TABLET(5 MG) BY MOUTH EVERY DAY    BLOOD SUGAR DIAGNOSTIC STRP    Once daily glucose testing.    DULOXETINE (CYMBALTA) 60 MG CAPSULE    TAKE 1 CAPSULE(60 MG) BY MOUTH EVERY DAY    FLUTICASONE PROPIONATE (FLONASE) 50 MCG/ACTUATION NASAL SPRAY    2 sprays (100 mcg total) by Each Nostril route once daily.    IPRATROPIUM (ATROVENT) 0.02 % NEBULIZER SOLUTION    Take 2.5 mLs (500 mcg total) by nebulization once. Rescue for 1 dose    KETOCONAZOLE (NIZORAL) 2 % SHAMPOO    Apply topically.    LANCETS (LANCETS,THIN) MISC    Once daily  glucose testing.    MECLIZINE (ANTIVERT) 25 MG TABLET    Take 1 tablet (25 mg total) by mouth 3 (three) times daily as needed for Dizziness.    MELOXICAM (MOBIC) 7.5 MG TABLET    Take 1 tablet (7.5 mg total) by mouth once daily. Take with a meal    MONTELUKAST (SINGULAIR) 10 MG TABLET    Take 1 tablet (10 mg total) by mouth every evening.    OXYBUTYNIN (DITROPAN) 5 MG TAB    Take 1 tablet (5 mg total) by mouth 2 (two) times daily.    POLYMYXIN B SULF-TRIMETHOPRIM (POLYTRIM) 10,000 UNIT- 1 MG/ML DROP    Place 1 drop into both eyes every 6 (six) hours.    POTASSIUM CHLORIDE SA (K-DUR,KLOR-CON) 10 MEQ TABLET    TAKE 1 TABLET BY MOUTH EVERY DAY    PREGABALIN (LYRICA) 75 MG CAPSULE    Take 1 capsule (75 mg total) by mouth 2 (two) times daily.    ROSUVASTATIN (CRESTOR) 40 MG TAB    Take 1 tablet (40 mg total) by mouth every evening.    TIRZEPATIDE (MOUNJARO) 5 MG/0.5 ML PNIJ    Inject 5 mg into the skin every 7 days.    TRUE METRIX AIR GLUCOSE METER MISC    USE AS DIRECTED TO TEST BLOOD SUGAR ONCE DAILY   Discontinued Medications    METHOCARBAMOL (ROBAXIN) 750 MG TAB    methocarbamol Take 1 Tablet (oral) 3 times per day for 5 days 20220311 tablet 3 times per day oral 5 days active 750 mg       Review of patient's allergies indicates:   Allergen Reactions    Lipitor [atorvastatin]      Other reaction(s): Muscle pain       Past Surgical History:   Procedure Laterality Date    COLONOSCOPY N/A 7/19/2022    Procedure: COLONOSCOPY;  Surgeon: Elisabeth Rodriguez MD;  Location: [facility];  Service: Endoscopy;  Laterality: N/A;    FOOT SURGERY  at 11y/o    arch lift    INJECTION OF ANESTHETIC AGENT AROUND MEDIAL BRANCH NERVES INNERVATING LUMBAR FACET JOINT Bilateral 7/28/2022    Procedure: diagnostic bilateral L3-5 MBB;  Surgeon: Nick Kruger MD;  Location: [facility];  Service: Pain Management;  Laterality: Bilateral;    INJECTION OF ANESTHETIC AGENT INTO SACROILIAC JOINT Bilateral 6/16/2022    Procedure: Bilateral SIJ  "Injection;  Surgeon: Sebastien Cameron MD;  Location: [facility];  Service: Pain Management;  Laterality: Bilateral;       Family History   Problem Relation Age of Onset    Diabetes Mother     Hypertension Mother     Hypertension Father     Hyperlipidemia Father     Breast cancer Sister 58    Colon cancer Neg Hx     Ovarian cancer Neg Hx        Social History     Socioeconomic History    Marital status:     Number of children: 1   Occupational History     Employer: [employer]   Tobacco Use    Smoking status: Never    Smokeless tobacco: Never   Substance and Sexual Activity    Alcohol use: No     Alcohol/week: 0.0 standard drinks    Drug use: No    Sexual activity: Yes     Partners: Male     Birth control/protection: Post-menopausal       Vitals:    06/23/23 0833   Weight: 107.5 kg (236 lb 15.9 oz)   Height: 5' 5" (1.651 m)   PainSc:   8       Hemoglobin A1C   Date Value Ref Range Status   05/05/2023 6.3 (H) 4.0 - 5.6 % Final     Comment:     ADA Screening Guidelines:  5.7-6.4%  Consistent with prediabetes  >or=6.5%  Consistent with diabetes    High levels of fetal hemoglobin interfere with the HbA1C  assay. Heterozygous hemoglobin variants (HbS, HgC, etc)do  not significantly interfere with this assay.   However, presence of multiple variants may affect accuracy.     10/26/2022 5.9 (H) 4.0 - 5.6 % Final     Comment:     ADA Screening Guidelines:  5.7-6.4%  Consistent with prediabetes  >or=6.5%  Consistent with diabetes    High levels of fetal hemoglobin interfere with the HbA1C  assay. Heterozygous hemoglobin variants (HbS, HgC, etc)do  not significantly interfere with this assay.   However, presence of multiple variants may affect accuracy.     08/12/2022 6.0 (H) 4.0 - 5.6 % Final     Comment:     ADA Screening Guidelines:  5.7-6.4%  Consistent with prediabetes  >or=6.5%  Consistent with diabetes    High levels of fetal hemoglobin interfere with the HbA1C  assay. Heterozygous hemoglobin " variants (HbS, HgC, etc)do  not significantly interfere with this assay.   However, presence of multiple variants may affect accuracy.         Review of Systems   Constitutional:  Negative for chills and fever.   Respiratory:  Negative for shortness of breath.    Cardiovascular:  Negative for chest pain, palpitations, orthopnea, claudication and leg swelling.   Gastrointestinal:  Negative for diarrhea, nausea and vomiting.   Musculoskeletal:  Negative for joint pain.   Skin:  Negative for rash.   Neurological:  Negative for dizziness, tingling, sensory change, focal weakness and weakness.   Psychiatric/Behavioral: Negative.             Objective:      PHYSICAL EXAM: Apperance: Alert and orient in no distress,well developed, and with good attention to grooming and body habits  Patient presents ambulating in sandals..  Lower Extremity Exam  VASCULAR: Dorsalis pedis pulses 2/4 bilateral and Posterior Tibial pulses 2/4 bilateral.   DERMATOLOGICAL: No skin rashes, subcutaneous nodules, lesions, or ulcers observed bilateral.   NEUROLOGICAL: Light touch, sharp-dull, proprioception all present and equal bilaterally.    MUSCULOSKELETAL: Muscle strength 5/5 for all foot inverters, everters, plantarflexors, and dorsiflexors bilateral. Ankle joints bilateral shows decreased ROM. bilateral ankle ROM shows greater decrease in dorsiflexion with knee extended. Ankle joint ROM is pain free and without crepitus bilateral. Pain to palpation right plantar medial tubercle. Plantar medial aspect of bilateral heels shows tenderness to palpation. No pain on medial-lateral compression of the calcaneus.         Assessment:       ICD-10-CM ICD-9-CM   1. Plantar fasciitis  M72.2 728.71   2. Inflammatory heel pain, right  M79.671 729.5   3. Equinus contracture of ankle  M24.573 718.47       Plan:   Plantar fasciitis  -     methylPREDNISolone (MEDROL DOSEPACK) 4 mg tablet; Take 1 tablet (4 mg total) by mouth once daily. Use as instructed on dose  pack  Dispense: 1 each; Refill: 0  -     methocarbamoL (ROBAXIN) 500 MG Tab; Take 1 tablet (500 mg total) by mouth 4 (four) times daily. for 10 days  Dispense: 40 tablet; Refill: 0    Inflammatory heel pain, right  -     methylPREDNISolone (MEDROL DOSEPACK) 4 mg tablet; Take 1 tablet (4 mg total) by mouth once daily. Use as instructed on dose pack  Dispense: 1 each; Refill: 0    Equinus contracture of ankle  -     methocarbamoL (ROBAXIN) 500 MG Tab; Take 1 tablet (500 mg total) by mouth 4 (four) times daily. for 10 days  Dispense: 40 tablet; Refill: 0      I counseled the patient on her conditions, regarding findings of my examination, my impressions, and usual treatment plan.   I explained to the patient that etiology and treatment options for heel pain including rest,  ice messages, stretching exercises, strappings/tappings, NSAID's, injections, new shoegear with orthotic inserts, and/or surgical treatment.   Patient agreed to oral and stretching therapy today.   I gave written and verbal instructions on heel cord stretching and this was demonstrated for the patient. Patient expressed understanding.  Prescription written for Robaxin 500gm to be taken as needed at night.   Prescribed Medrol Dosepak to be taken as directed on package. Discussed possible increase in blood sugar with taking steroid medication. Patient advised on the possible elevation of blood pressure sugar and caution to take pills as prescribed and to discontinue use if symptoms arise, patient agreed.   Patient instructed on adequate icing techniques. Patient should ice the affected area at least once per day x 10 minutes for 10 days . I advised the  patient that extra icing would also be beneficial to ensure adequate anti inflammatory effect.   The patient and I reviewed the types of shoes she should be wearing, my recommendation includes generally the best time of the day for a shoe fitting is the afternoon, shoes with a wide toe box, very good  cushion, and tennis shoes with removable inner soles. The patient and I reviewed my recommendations for over-the-counter orthotic inserts.   Patient to return in 6 week or sooner if needed.          Lety Downey DPM  [facility]

## 2023-06-26 ENCOUNTER — HOSPITAL ENCOUNTER (OUTPATIENT)
Dept: RADIOLOGY | Facility: HOSPITAL | Age: 58
Discharge: HOME | End: 2023-06-26
Attending: PHYSICIAN ASSISTANT
Payer: COMMERCIAL

## 2023-06-26 DIAGNOSIS — Z12.31 ENCOUNTER FOR SCREENING MAMMOGRAM FOR MALIGNANT NEOPLASM OF BREAST: ICD-10-CM

## 2023-08-17 ENCOUNTER — PATIENT MESSAGE (OUTPATIENT)
Dept: PODIATRY | Facility: CLINIC | Age: 58
End: 2023-08-17
Payer: COMMERCIAL

## 2023-08-22 ENCOUNTER — OFFICE VISIT (OUTPATIENT)
Dept: PODIATRY | Facility: CLINIC | Age: 58
End: 2023-08-22
Payer: COMMERCIAL

## 2023-08-22 VITALS — WEIGHT: 237 LBS | HEIGHT: 65 IN | BODY MASS INDEX: 39.49 KG/M2

## 2023-08-22 DIAGNOSIS — M72.2 PLANTAR FASCIITIS: Primary | ICD-10-CM

## 2023-08-22 DIAGNOSIS — M72.2 PLANTAR FASCIITIS: ICD-10-CM

## 2023-08-22 DIAGNOSIS — M79.672 INFLAMMATORY HEEL PAIN, LEFT: ICD-10-CM

## 2023-08-22 DIAGNOSIS — M79.671 INFLAMMATORY HEEL PAIN, RIGHT: ICD-10-CM

## 2023-08-22 PROCEDURE — 20550 NJX 1 TENDON SHEATH/LIGAMENT: CPT | Mod: 50,S$GLB,, | Performed by: PODIATRIST

## 2023-08-22 PROCEDURE — 1159F PR MEDICATION LIST DOCUMENTED IN MEDICAL RECORD: ICD-10-PCS | Mod: CPTII,S$GLB,, | Performed by: PODIATRIST

## 2023-08-22 PROCEDURE — 3061F NEG MICROALBUMINURIA REV: CPT | Mod: CPTII,S$GLB,, | Performed by: PODIATRIST

## 2023-08-22 PROCEDURE — 3066F NEPHROPATHY DOC TX: CPT | Mod: CPTII,S$GLB,, | Performed by: PODIATRIST

## 2023-08-22 PROCEDURE — 3061F PR NEG MICROALBUMINURIA RESULT DOCUMENTED/REVIEW: ICD-10-PCS | Mod: CPTII,S$GLB,, | Performed by: PODIATRIST

## 2023-08-22 PROCEDURE — 99999 PR PBB SHADOW E&M-EST. PATIENT-LVL III: CPT | Mod: PBBFAC,,, | Performed by: PODIATRIST

## 2023-08-22 PROCEDURE — 99999 PR PBB SHADOW E&M-EST. PATIENT-LVL III: ICD-10-PCS | Mod: PBBFAC,,, | Performed by: PODIATRIST

## 2023-08-22 PROCEDURE — 99213 OFFICE O/P EST LOW 20 MIN: CPT | Mod: 25,S$GLB,, | Performed by: PODIATRIST

## 2023-08-22 PROCEDURE — 1160F RVW MEDS BY RX/DR IN RCRD: CPT | Mod: CPTII,S$GLB,, | Performed by: PODIATRIST

## 2023-08-22 PROCEDURE — 3066F PR DOCUMENTATION OF TREATMENT FOR NEPHROPATHY: ICD-10-PCS | Mod: CPTII,S$GLB,, | Performed by: PODIATRIST

## 2023-08-22 PROCEDURE — 3044F HG A1C LEVEL LT 7.0%: CPT | Mod: CPTII,S$GLB,, | Performed by: PODIATRIST

## 2023-08-22 PROCEDURE — 3008F PR BODY MASS INDEX (BMI) DOCUMENTED: ICD-10-PCS | Mod: CPTII,S$GLB,, | Performed by: PODIATRIST

## 2023-08-22 PROCEDURE — 1159F MED LIST DOCD IN RCRD: CPT | Mod: CPTII,S$GLB,, | Performed by: PODIATRIST

## 2023-08-22 PROCEDURE — 20550 PR INJECT TENDON SHEATH/LIGAMENT: ICD-10-PCS | Mod: 50,S$GLB,, | Performed by: PODIATRIST

## 2023-08-22 PROCEDURE — 3044F PR MOST RECENT HEMOGLOBIN A1C LEVEL <7.0%: ICD-10-PCS | Mod: CPTII,S$GLB,, | Performed by: PODIATRIST

## 2023-08-22 PROCEDURE — 99213 PR OFFICE/OUTPT VISIT, EST, LEVL III, 20-29 MIN: ICD-10-PCS | Mod: 25,S$GLB,, | Performed by: PODIATRIST

## 2023-08-22 PROCEDURE — 3008F BODY MASS INDEX DOCD: CPT | Mod: CPTII,S$GLB,, | Performed by: PODIATRIST

## 2023-08-22 PROCEDURE — 1160F PR REVIEW ALL MEDS BY PRESCRIBER/CLIN PHARMACIST DOCUMENTED: ICD-10-PCS | Mod: CPTII,S$GLB,, | Performed by: PODIATRIST

## 2023-08-22 RX ORDER — METHYLPREDNISOLONE 4 MG/1
4 TABLET ORAL DAILY
Qty: 1 EACH | Refills: 0 | Status: SHIPPED | OUTPATIENT
Start: 2023-08-22 | End: 2023-12-19 | Stop reason: ALTCHOICE

## 2023-08-22 RX ORDER — TRIAMCINOLONE ACETONIDE 40 MG/ML
40 INJECTION, SUSPENSION INTRA-ARTICULAR; INTRAMUSCULAR
Status: COMPLETED | OUTPATIENT
Start: 2023-08-22 | End: 2023-08-22

## 2023-08-22 RX ADMIN — TRIAMCINOLONE ACETONIDE 40 MG: 40 INJECTION, SUSPENSION INTRA-ARTICULAR; INTRAMUSCULAR at 08:08

## 2023-08-22 NOTE — PROGRESS NOTES
Subjective:     Patient ID: Leydi Quintero is a 57 y.o. female.    Chief Complaint: Heel Pain (Pt c/o BL heel pain 5/10, diabetic pt wears sandals, PCP Dr. Chavez last seen 6-5-23)    Leydi is a 57 y.o. female who presents to the podiatry clinic for follow up of bilateral foot pain. Patient states pain is better but very much still present. Current symptoms include: ability to bear weight, but with some pain. Aggravating factors: standing and walking. Symptoms have gradually improved. Treatment to date:  oral steroid, muscle relaxer, and OTC anti-inflammatory . Patients rates pain 5/10 on pain scale. Patient has no other pedal complaints at this time.     Patient Active Problem List   Diagnosis    Hyperlipidemia LDL goal <70    Hypertension goal BP (blood pressure) < 130/80    Obesity (BMI 30-39.9)    Type 2 diabetes mellitus without complication, without long-term current use of insulin    Mild intermittent asthma without complication       Medication List with Changes/Refills   Current Medications    ALBUTEROL-IPRATROPIUM (DUO-NEB) 2.5 MG-0.5 MG/3 ML NEBULIZER SOLUTION    Take 3 mLs by nebulization every 6 (six) hours as needed for Wheezing. Rescue    AMLODIPINE (NORVASC) 5 MG TABLET    TAKE 1 TABLET(5 MG) BY MOUTH EVERY DAY    BLOOD SUGAR DIAGNOSTIC STRP    Once daily glucose testing.    DULOXETINE (CYMBALTA) 60 MG CAPSULE    TAKE 1 CAPSULE(60 MG) BY MOUTH EVERY DAY    FLUTICASONE PROPIONATE (FLONASE) 50 MCG/ACTUATION NASAL SPRAY    2 sprays (100 mcg total) by Each Nostril route once daily.    IPRATROPIUM (ATROVENT) 0.02 % NEBULIZER SOLUTION    Take 2.5 mLs (500 mcg total) by nebulization once. Rescue for 1 dose    KETOCONAZOLE (NIZORAL) 2 % SHAMPOO    Apply topically.    LANCETS (LANCETS,THIN) MISC    Once daily glucose testing.    MECLIZINE (ANTIVERT) 25 MG TABLET    Take 1 tablet (25 mg total) by mouth 3 (three) times daily as needed for Dizziness.    MELOXICAM (MOBIC) 7.5 MG TABLET    Take 1 tablet  (7.5 mg total) by mouth once daily. Take with a meal    MONTELUKAST (SINGULAIR) 10 MG TABLET    Take 1 tablet (10 mg total) by mouth every evening.    OXYBUTYNIN (DITROPAN) 5 MG TAB    Take 1 tablet (5 mg total) by mouth 2 (two) times daily.    POLYMYXIN B SULF-TRIMETHOPRIM (POLYTRIM) 10,000 UNIT- 1 MG/ML DROP    Place 1 drop into both eyes every 6 (six) hours.    POTASSIUM CHLORIDE SA (K-DUR,KLOR-CON) 10 MEQ TABLET    TAKE 1 TABLET BY MOUTH EVERY DAY    PREGABALIN (LYRICA) 75 MG CAPSULE    Take 1 capsule (75 mg total) by mouth 2 (two) times daily.    ROSUVASTATIN (CRESTOR) 40 MG TAB    Take 1 tablet (40 mg total) by mouth every evening.    TIRZEPATIDE (MOUNJARO) 5 MG/0.5 ML PNIJ    Inject 5 mg into the skin every 7 days.    TRUE METRIX AIR GLUCOSE METER MISC    USE AS DIRECTED TO TEST BLOOD SUGAR ONCE DAILY   Changed and/or Refilled Medications    Modified Medication Previous Medication    METHYLPREDNISOLONE (MEDROL DOSEPACK) 4 MG TABLET methylPREDNISolone (MEDROL DOSEPACK) 4 mg tablet       Take 1 tablet (4 mg total) by mouth once daily. Use as instructed on dose pack    Take 1 tablet (4 mg total) by mouth once daily. Use as instructed on dose pack       Review of patient's allergies indicates:   Allergen Reactions    Lipitor [atorvastatin]      Other reaction(s): Muscle pain       Past Surgical History:   Procedure Laterality Date    COLONOSCOPY N/A 7/19/2022    Procedure: COLONOSCOPY;  Surgeon: Elisabeth Rodriguez MD;  Location: [facility];  Service: Endoscopy;  Laterality: N/A;    FOOT SURGERY  at 9y/o    arch lift    INJECTION OF ANESTHETIC AGENT AROUND MEDIAL BRANCH NERVES INNERVATING LUMBAR FACET JOINT Bilateral 7/28/2022    Procedure: diagnostic bilateral L3-5 MBB;  Surgeon: Nick Kruger MD;  Location: [facility];  Service: Pain Management;  Laterality: Bilateral;    INJECTION OF ANESTHETIC AGENT INTO SACROILIAC JOINT Bilateral 6/16/2022    Procedure: Bilateral SIJ Injection;  Surgeon: Sebastien CHAVEZ  "MD Nisha;  Location: [facility];  Service: Pain Management;  Laterality: Bilateral;       Family History   Problem Relation Age of Onset    Diabetes Mother     Hypertension Mother     Hypertension Father     Hyperlipidemia Father     Breast cancer Sister 58    Colon cancer Neg Hx     Ovarian cancer Neg Hx        Social History     Socioeconomic History    Marital status:     Number of children: 1   Occupational History     Employer: [employer].    Tobacco Use    Smoking status: Never    Smokeless tobacco: Never   Substance and Sexual Activity    Alcohol use: No     Alcohol/week: 0.0 standard drinks of alcohol    Drug use: No    Sexual activity: Yes     Partners: Male     Birth control/protection: Post-menopausal       Vitals:    08/22/23 0745   Weight: 107.5 kg (236 lb 15.9 oz)   Height: 5' 5" (1.651 m)   PainSc:   5       Review of Systems   Constitutional:  Negative for chills and fever.   Respiratory:  Negative for shortness of breath.    Cardiovascular:  Negative for chest pain, palpitations, orthopnea, claudication and leg swelling.   Gastrointestinal:  Negative for diarrhea, nausea and vomiting.   Musculoskeletal:  Negative for joint pain.   Skin:  Negative for rash.   Neurological:  Negative for dizziness, tingling, sensory change, focal weakness and weakness.   Psychiatric/Behavioral: Negative.             Objective:      PHYSICAL EXAM: Apperance: Alert and orient in no distress,well developed, and with good attention to grooming and body habits  Patient presents ambulating in sandals..  Lower Extremity Exam  VASCULAR: Dorsalis pedis pulses 2/4 bilateral and Posterior Tibial pulses 2/4 bilateral.   DERMATOLOGICAL: No skin rashes, subcutaneous nodules, lesions, or ulcers observed bilateral.   NEUROLOGICAL: Light touch, sharp-dull, proprioception all present and equal bilaterally.    MUSCULOSKELETAL: Muscle strength 5/5 for all foot inverters, everters, plantarflexors, and dorsiflexors " bilateral. Ankle joints bilateral shows decreased ROM. bilateral ankle ROM shows greater decrease in dorsiflexion with knee extended. Ankle joint ROM is pain free and without crepitus bilateral. Pain to palpation bilateral plantar medial tubercle. Plantar medial aspect of bilateral heels shows tenderness to palpation. No pain on medial-lateral compression of the calcaneus.        Assessment:       ICD-10-CM ICD-9-CM   1. Plantar fasciitis - Right Foot  M72.2 728.71   2. Inflammatory heel pain, right  M79.671 729.5   3. Inflammatory heel pain, left  M79.672 729.5   4. Plantar fasciitis - Left Foot  M72.2 728.71       Plan:   Plantar fasciitis - Right Foot  -     methylPREDNISolone (MEDROL DOSEPACK) 4 mg tablet; Take 1 tablet (4 mg total) by mouth once daily. Use as instructed on dose pack  Dispense: 1 each; Refill: 0  -     triamcinolone acetonide injection 40 mg    Inflammatory heel pain, right  -     methylPREDNISolone (MEDROL DOSEPACK) 4 mg tablet; Take 1 tablet (4 mg total) by mouth once daily. Use as instructed on dose pack  Dispense: 1 each; Refill: 0  -     triamcinolone acetonide injection 40 mg    Inflammatory heel pain, left  -     triamcinolone acetonide injection 40 mg    Plantar fasciitis - Left Foot  -     methylPREDNISolone (MEDROL DOSEPACK) 4 mg tablet; Take 1 tablet (4 mg total) by mouth once daily. Use as instructed on dose pack  Dispense: 1 each; Refill: 0  -     triamcinolone acetonide injection 40 mg      I counseled the patient on her conditions, regarding findings of my examination, my impressions, and usual treatment plan.   I explained to the patient that etiology and treatment options for heel pain including rest,  ice messages, stretching exercises, strappings/tappings, NSAID's, injections, new shoegear with orthotic inserts, and/or surgical treatment.   Patient agreed to injection and oral therapy.   I gave written and verbal instructions on heel cord stretching and this was demonstrated  for the patient. Patient expressed understanding.  Patient agreed to injection therapy today. After sterilizing the area of bilateral medial heels with an alcohol prep, the affected area  was injected with a solution containing 1 cc of 1% lidocaine plain, 1cc of 0.5% Marcaine plain and 1 cc of Kenalog 40%.  Injection area was then covered with band-aid. The patient tolerated the injection well and reported comfort to the area.  Prescribed Medrol Dosepak to be taken as directed on package. Discussed possible increase in blood sugar with taking steroid medication. Patient advised on the possible elevation of blood pressure sugar and caution to take pills as prescribed and to discontinue use if symptoms arise, patient agreed.   Patient instructed on adequate icing techniques. Patient should ice the affected area at least once per day x 10 minutes for 10 days . I advised the  patient that extra icing would also be beneficial to ensure adequate anti inflammatory effect.   The patient and I reviewed the types of shoes she should be wearing, my recommendation includes generally the best time of the day for a shoe fitting is the afternoon, shoes with a wide toe box, very good cushion, and tennis shoes with removable inner soles. The patient and I reviewed my recommendations for over-the-counter orthotic inserts.   Patient to return in 2-3 months or sooner if needed.        Lety Downey DPM  [facility]

## 2023-10-06 DIAGNOSIS — F41.9 ANXIETY AND DEPRESSION: ICD-10-CM

## 2023-10-06 DIAGNOSIS — F32.A ANXIETY AND DEPRESSION: ICD-10-CM

## 2023-10-06 RX ORDER — DULOXETIN HYDROCHLORIDE 60 MG/1
CAPSULE, DELAYED RELEASE ORAL
Qty: 90 CAPSULE | Refills: 0 | Status: SHIPPED | OUTPATIENT
Start: 2023-10-06 | End: 2024-01-01

## 2023-10-06 NOTE — TELEPHONE ENCOUNTER
Refill Decision Note   Orlandohaley Quintero  is requesting a refill authorization.  Brief Assessment and Rationale for Refill:  Approve     Medication Therapy Plan:         Comments:     Note composed:6:54 AM 10/06/2023             Appointments     Last Visit   10/28/2022 Miranda Chavez DO   Next Visit   12/5/2023 Miranda Chavez DO

## 2023-10-06 NOTE — TELEPHONE ENCOUNTER
No care due was identified.  [facility] Embedded Care Due Messages. Reference number: [id number].   10/06/2023 5:14:52 AM CDT

## 2023-10-09 ENCOUNTER — PATIENT OUTREACH (OUTPATIENT)
Dept: ADMINISTRATIVE | Facility: HOSPITAL | Age: 58
End: 2023-10-09
Payer: COMMERCIAL

## 2023-11-04 DIAGNOSIS — N32.81 OVERACTIVE BLADDER: ICD-10-CM

## 2023-11-04 NOTE — TELEPHONE ENCOUNTER
Care Due:                  Date            Visit Type   Department     Provider  --------------------------------------------------------------------------------                                EP -                              PRIMARY      [facility] INTERNAL  Last Visit: 10-      CARE ([facility])   SHEBA Chavez                              EP -                              PRIMARY      [facility] INTERNAL  Next Visit: 12-      CARE ([facility])   SHEBA Chavez                                                            Last  Test          Frequency    Reason                     Performed    Due Date  --------------------------------------------------------------------------------    HBA1C.......  6 months...  tirzepatide..............  05- 11-    [employer] Embedded Care Due Messages. Reference number: [id number].   11/04/2023 12:05:52 PM CDT

## 2023-11-05 RX ORDER — OXYBUTYNIN CHLORIDE 5 MG/1
5 TABLET ORAL 2 TIMES DAILY
Qty: 180 TABLET | Refills: 0 | Status: SHIPPED | OUTPATIENT
Start: 2023-11-05

## 2023-11-05 NOTE — TELEPHONE ENCOUNTER
Refill Decision Note   Leydi Quintero  is requesting a refill authorization.  Brief Assessment and Rationale for Refill:  Approve     Medication Therapy Plan:  FLOS      Comments:     Note composed:1:27 AM 11/05/2023

## 2023-11-14 DIAGNOSIS — E78.5 HYPERLIPIDEMIA LDL GOAL <70: ICD-10-CM

## 2023-11-14 RX ORDER — ROSUVASTATIN CALCIUM 40 MG/1
40 TABLET, COATED ORAL NIGHTLY
Qty: 90 TABLET | Refills: 0 | Status: SHIPPED | OUTPATIENT
Start: 2023-11-14 | End: 2024-03-26

## 2023-11-14 NOTE — TELEPHONE ENCOUNTER
No care due was identified.  [facility] Embedded Care Due Messages. Reference number: [id number].   11/14/2023 4:08:37 PM CST

## 2023-11-14 NOTE — TELEPHONE ENCOUNTER
Refill Decision Note   Orlandohaley Quintero  is requesting a refill authorization.  Brief Assessment and Rationale for Refill:  Approve     Medication Therapy Plan:  DDI Overridden by Miranda Chavez DO on Jan 18, 2023 5:56 PM      Alert overridden per protocol: Yes   Comments:     Note composed:5:18 PM 11/14/2023

## 2023-11-29 ENCOUNTER — PATIENT OUTREACH (OUTPATIENT)
Dept: ADMINISTRATIVE | Facility: HOSPITAL | Age: 58
End: 2023-11-29
Payer: COMMERCIAL

## 2023-12-19 ENCOUNTER — LAB VISIT (OUTPATIENT)
Dept: LAB | Facility: HOSPITAL | Age: 58
End: 2023-12-19
Attending: INTERNAL MEDICINE
Payer: COMMERCIAL

## 2023-12-19 ENCOUNTER — TELEPHONE (OUTPATIENT)
Dept: PODIATRY | Facility: CLINIC | Age: 58
End: 2023-12-19
Payer: COMMERCIAL

## 2023-12-19 ENCOUNTER — OFFICE VISIT (OUTPATIENT)
Dept: INTERNAL MEDICINE | Facility: CLINIC | Age: 58
End: 2023-12-19
Payer: COMMERCIAL

## 2023-12-19 VITALS
DIASTOLIC BLOOD PRESSURE: 82 MMHG | RESPIRATION RATE: 20 BRPM | WEIGHT: 231.5 LBS | HEART RATE: 95 BPM | TEMPERATURE: 97 F | SYSTOLIC BLOOD PRESSURE: 130 MMHG | HEIGHT: 65 IN | OXYGEN SATURATION: 100 % | BODY MASS INDEX: 38.57 KG/M2

## 2023-12-19 DIAGNOSIS — Z00.00 ANNUAL PHYSICAL EXAM: Primary | ICD-10-CM

## 2023-12-19 DIAGNOSIS — J45.20 MILD INTERMITTENT ASTHMA WITHOUT COMPLICATION: ICD-10-CM

## 2023-12-19 DIAGNOSIS — Z00.00 ANNUAL PHYSICAL EXAM: ICD-10-CM

## 2023-12-19 DIAGNOSIS — E78.5 HYPERLIPIDEMIA LDL GOAL <70: Chronic | ICD-10-CM

## 2023-12-19 DIAGNOSIS — E11.9 CONTROLLED TYPE 2 DIABETES MELLITUS WITHOUT COMPLICATION, WITHOUT LONG-TERM CURRENT USE OF INSULIN: ICD-10-CM

## 2023-12-19 DIAGNOSIS — I10 HYPERTENSION GOAL BP (BLOOD PRESSURE) < 130/80: Chronic | ICD-10-CM

## 2023-12-19 DIAGNOSIS — Z23 NEED FOR VACCINATION: ICD-10-CM

## 2023-12-19 DIAGNOSIS — M46.1 SACROILIITIS: ICD-10-CM

## 2023-12-19 DIAGNOSIS — E66.01 SEVERE OBESITY (BMI 35.0-39.9) WITH COMORBIDITY: ICD-10-CM

## 2023-12-19 LAB
ALBUMIN SERPL BCP-MCNC: 3.8 G/DL (ref 3.5–5.2)
ALP SERPL-CCNC: 89 U/L (ref 55–135)
ALT SERPL W/O P-5'-P-CCNC: 18 U/L (ref 10–44)
ANION GAP SERPL CALC-SCNC: 12 MMOL/L (ref 8–16)
AST SERPL-CCNC: 16 U/L (ref 10–40)
BASOPHILS # BLD AUTO: 0.02 K/UL (ref 0–0.2)
BASOPHILS NFR BLD: 0.2 % (ref 0–1.9)
BILIRUB SERPL-MCNC: 0.6 MG/DL (ref 0.1–1)
BUN SERPL-MCNC: 11 MG/DL (ref 6–20)
CALCIUM SERPL-MCNC: 9.8 MG/DL (ref 8.7–10.5)
CHLORIDE SERPL-SCNC: 113 MMOL/L (ref 95–110)
CHOLEST SERPL-MCNC: 189 MG/DL (ref 120–199)
CHOLEST/HDLC SERPL: 4 {RATIO} (ref 2–5)
CO2 SERPL-SCNC: 20 MMOL/L (ref 23–29)
CREAT SERPL-MCNC: 1.1 MG/DL (ref 0.5–1.4)
DIFFERENTIAL METHOD: ABNORMAL
EOSINOPHIL # BLD AUTO: 0.1 K/UL (ref 0–0.5)
EOSINOPHIL NFR BLD: 1.6 % (ref 0–8)
ERYTHROCYTE [DISTWIDTH] IN BLOOD BY AUTOMATED COUNT: 13.4 % (ref 11.5–14.5)
EST. GFR  (NO RACE VARIABLE): 57.9 ML/MIN/1.73 M^2
ESTIMATED AVG GLUCOSE: 126 MG/DL (ref 68–131)
GLUCOSE SERPL-MCNC: 79 MG/DL (ref 70–110)
HBA1C MFR BLD: 6 % (ref 4–5.6)
HCT VFR BLD AUTO: 40.5 % (ref 37–48.5)
HDLC SERPL-MCNC: 47 MG/DL (ref 40–75)
HDLC SERPL: 24.9 % (ref 20–50)
HGB BLD-MCNC: 13 G/DL (ref 12–16)
IMM GRANULOCYTES # BLD AUTO: 0.05 K/UL (ref 0–0.04)
IMM GRANULOCYTES NFR BLD AUTO: 0.6 % (ref 0–0.5)
LDLC SERPL CALC-MCNC: 119 MG/DL (ref 63–159)
LYMPHOCYTES # BLD AUTO: 2.1 K/UL (ref 1–4.8)
LYMPHOCYTES NFR BLD: 26 % (ref 18–48)
MCH RBC QN AUTO: 26.2 PG (ref 27–31)
MCHC RBC AUTO-ENTMCNC: 32.1 G/DL (ref 32–36)
MCV RBC AUTO: 82 FL (ref 82–98)
MONOCYTES # BLD AUTO: 0.4 K/UL (ref 0.3–1)
MONOCYTES NFR BLD: 5.2 % (ref 4–15)
NEUTROPHILS # BLD AUTO: 5.4 K/UL (ref 1.8–7.7)
NEUTROPHILS NFR BLD: 66.4 % (ref 38–73)
NONHDLC SERPL-MCNC: 142 MG/DL
NRBC BLD-RTO: 0 /100 WBC
PLATELET # BLD AUTO: 283 K/UL (ref 150–450)
PMV BLD AUTO: 12.1 FL (ref 9.2–12.9)
POTASSIUM SERPL-SCNC: 4.1 MMOL/L (ref 3.5–5.1)
PROT SERPL-MCNC: 7.4 G/DL (ref 6–8.4)
RBC # BLD AUTO: 4.96 M/UL (ref 4–5.4)
SODIUM SERPL-SCNC: 145 MMOL/L (ref 136–145)
TRIGL SERPL-MCNC: 115 MG/DL (ref 30–150)
TSH SERPL DL<=0.005 MIU/L-ACNC: 1.23 UIU/ML (ref 0.4–4)
WBC # BLD AUTO: 8.11 K/UL (ref 3.9–12.7)

## 2023-12-19 PROCEDURE — 3079F PR MOST RECENT DIASTOLIC BLOOD PRESSURE 80-89 MM HG: ICD-10-PCS | Mod: CPTII,S$GLB,, | Performed by: INTERNAL MEDICINE

## 2023-12-19 PROCEDURE — 36415 COLL VENOUS BLD VENIPUNCTURE: CPT | Performed by: INTERNAL MEDICINE

## 2023-12-19 PROCEDURE — 99396 PREV VISIT EST AGE 40-64: CPT | Mod: 25,S$GLB,, | Performed by: INTERNAL MEDICINE

## 2023-12-19 PROCEDURE — 80053 COMPREHEN METABOLIC PANEL: CPT | Performed by: INTERNAL MEDICINE

## 2023-12-19 PROCEDURE — 3008F BODY MASS INDEX DOCD: CPT | Mod: CPTII,S$GLB,, | Performed by: INTERNAL MEDICINE

## 2023-12-19 PROCEDURE — 3075F SYST BP GE 130 - 139MM HG: CPT | Mod: CPTII,S$GLB,, | Performed by: INTERNAL MEDICINE

## 2023-12-19 PROCEDURE — 84443 ASSAY THYROID STIM HORMONE: CPT | Performed by: INTERNAL MEDICINE

## 2023-12-19 PROCEDURE — 90471 FLU VACCINE (QUAD) GREATER THAN OR EQUAL TO 3YO PRESERVATIVE FREE IM: ICD-10-PCS | Mod: S$GLB,,, | Performed by: INTERNAL MEDICINE

## 2023-12-19 PROCEDURE — 3061F PR NEG MICROALBUMINURIA RESULT DOCUMENTED/REVIEW: ICD-10-PCS | Mod: CPTII,S$GLB,, | Performed by: INTERNAL MEDICINE

## 2023-12-19 PROCEDURE — 1160F RVW MEDS BY RX/DR IN RCRD: CPT | Mod: CPTII,S$GLB,, | Performed by: INTERNAL MEDICINE

## 2023-12-19 PROCEDURE — 80061 LIPID PANEL: CPT | Performed by: INTERNAL MEDICINE

## 2023-12-19 PROCEDURE — 3008F PR BODY MASS INDEX (BMI) DOCUMENTED: ICD-10-PCS | Mod: CPTII,S$GLB,, | Performed by: INTERNAL MEDICINE

## 2023-12-19 PROCEDURE — 1159F MED LIST DOCD IN RCRD: CPT | Mod: CPTII,S$GLB,, | Performed by: INTERNAL MEDICINE

## 2023-12-19 PROCEDURE — 90686 FLU VACCINE (QUAD) GREATER THAN OR EQUAL TO 3YO PRESERVATIVE FREE IM: ICD-10-PCS | Mod: S$GLB,,, | Performed by: INTERNAL MEDICINE

## 2023-12-19 PROCEDURE — 1160F PR REVIEW ALL MEDS BY PRESCRIBER/CLIN PHARMACIST DOCUMENTED: ICD-10-PCS | Mod: CPTII,S$GLB,, | Performed by: INTERNAL MEDICINE

## 2023-12-19 PROCEDURE — 99396 PR PREVENTIVE VISIT,EST,40-64: ICD-10-PCS | Mod: 25,S$GLB,, | Performed by: INTERNAL MEDICINE

## 2023-12-19 PROCEDURE — 99999 PR PBB SHADOW E&M-EST. PATIENT-LVL V: ICD-10-PCS | Mod: PBBFAC,,, | Performed by: INTERNAL MEDICINE

## 2023-12-19 PROCEDURE — 83036 HEMOGLOBIN GLYCOSYLATED A1C: CPT | Performed by: INTERNAL MEDICINE

## 2023-12-19 PROCEDURE — 90471 IMMUNIZATION ADMIN: CPT | Mod: S$GLB,,, | Performed by: INTERNAL MEDICINE

## 2023-12-19 PROCEDURE — 3075F PR MOST RECENT SYSTOLIC BLOOD PRESS GE 130-139MM HG: ICD-10-PCS | Mod: CPTII,S$GLB,, | Performed by: INTERNAL MEDICINE

## 2023-12-19 PROCEDURE — 90686 IIV4 VACC NO PRSV 0.5 ML IM: CPT | Mod: S$GLB,,, | Performed by: INTERNAL MEDICINE

## 2023-12-19 PROCEDURE — 3066F NEPHROPATHY DOC TX: CPT | Mod: CPTII,S$GLB,, | Performed by: INTERNAL MEDICINE

## 2023-12-19 PROCEDURE — 3044F HG A1C LEVEL LT 7.0%: CPT | Mod: CPTII,S$GLB,, | Performed by: INTERNAL MEDICINE

## 2023-12-19 PROCEDURE — 99999 PR PBB SHADOW E&M-EST. PATIENT-LVL V: CPT | Mod: PBBFAC,,, | Performed by: INTERNAL MEDICINE

## 2023-12-19 PROCEDURE — 3079F DIAST BP 80-89 MM HG: CPT | Mod: CPTII,S$GLB,, | Performed by: INTERNAL MEDICINE

## 2023-12-19 PROCEDURE — 3066F PR DOCUMENTATION OF TREATMENT FOR NEPHROPATHY: ICD-10-PCS | Mod: CPTII,S$GLB,, | Performed by: INTERNAL MEDICINE

## 2023-12-19 PROCEDURE — 3061F NEG MICROALBUMINURIA REV: CPT | Mod: CPTII,S$GLB,, | Performed by: INTERNAL MEDICINE

## 2023-12-19 PROCEDURE — 1159F PR MEDICATION LIST DOCUMENTED IN MEDICAL RECORD: ICD-10-PCS | Mod: CPTII,S$GLB,, | Performed by: INTERNAL MEDICINE

## 2023-12-19 PROCEDURE — 3044F PR MOST RECENT HEMOGLOBIN A1C LEVEL <7.0%: ICD-10-PCS | Mod: CPTII,S$GLB,, | Performed by: INTERNAL MEDICINE

## 2023-12-19 PROCEDURE — 85025 COMPLETE CBC W/AUTO DIFF WBC: CPT | Performed by: INTERNAL MEDICINE

## 2023-12-19 NOTE — PROGRESS NOTES
Leydi Victoria Quintero  57 y.o. Black or  female  [id number]    Chief Complaint:  Chief Complaint   Patient presents with    Annual Exam       History of Present Illness:  Presents for an annual exam.  Fasting for labs today.   HTN--stable.   HLD--compliant with rosuvastatin.   DM--compliant with Mounjaro.   Asthma--has not had to use albuterol.   Continues to have low back pain. Has not seen pain management in a while.     Laboratory   Lab Results   Component Value Date    WBC 6.90 04/28/2022    HGB 12.5 04/28/2022    HCT 40.9 04/28/2022     04/28/2022    CHOL 183 05/05/2023    TRIG 135 05/05/2023    HDL 52 05/05/2023    ALT 21 05/05/2023    AST 19 05/05/2023     05/05/2023    K 4.2 05/05/2023     05/05/2023    CREATININE 1.0 05/05/2023    BUN 9 05/05/2023    CO2 22 (L) 05/05/2023    TSH 1.493 04/04/2018    HGBA1C 6.3 (H) 05/05/2023     Lab Results   Component Value Date    LDLCALC 104.0 05/05/2023     Review of Systems   Constitutional:  Negative for fever.   Respiratory:  Negative for cough, shortness of breath and wheezing.    Cardiovascular:  Negative for chest pain.   Gastrointestinal:  Negative for abdominal pain.   Genitourinary:  Positive for frequency. Negative for dysuria.   Musculoskeletal:  Positive for back pain and myalgias.   Neurological:  Negative for dizziness and headaches.   Endo/Heme/Allergies:  Positive for environmental allergies.       The following were reviewed: Active problem list, medication list, allergies, family history, social history, and Health Maintenance.     History:  Past Medical History:   Diagnosis Date    Allergic rhinitis     Anxiety     Asthma     Diabetes mellitus, type 2     Hyperlipidemia     Hypertension     Personal history of colonic polyps 07/24/2014    Sarcoidosis      Past Surgical History:   Procedure Laterality Date    COLONOSCOPY N/A 07/19/2022    Procedure: COLONOSCOPY;  Surgeon: Elisabeth Rodriguez MD;  Location: [facility];   Service: Endoscopy;  Laterality: N/A;    FOOT SURGERY  at 11y/o    arch lift    INJECTION OF ANESTHETIC AGENT AROUND MEDIAL BRANCH NERVES INNERVATING LUMBAR FACET JOINT Bilateral 07/28/2022    Procedure: diagnostic bilateral L3-5 MBB;  Surgeon: Nick Kruger MD;  Location: [facility];  Service: Pain Management;  Laterality: Bilateral;    INJECTION OF ANESTHETIC AGENT INTO SACROILIAC JOINT Bilateral 06/16/2022    Procedure: Bilateral SIJ Injection;  Surgeon: Sebastien Cameron MD;  Location: [facility];  Service: Pain Management;  Laterality: Bilateral;     Family History   Problem Relation Age of Onset    Diabetes Mother     Hypertension Mother     Hypertension Father     Hyperlipidemia Father     Breast cancer Sister 58    Colon cancer Neg Hx     Ovarian cancer Neg Hx      Social History     Socioeconomic History    Marital status:     Number of children: 1   Occupational History     Employer: [employer]    Tobacco Use    Smoking status: Never    Smokeless tobacco: Never   Substance and Sexual Activity    Alcohol use: No    Drug use: No    Sexual activity: Yes     Partners: Male     Birth control/protection: Post-menopausal     Patient Active Problem List   Diagnosis    Hyperlipidemia LDL goal <70    Hypertension goal BP (blood pressure) < 130/80    Obesity (BMI 30-39.9)    Controlled type 2 diabetes mellitus without complication, without long-term current use of insulin    Mild intermittent asthma without complication    Sacroiliitis     Review of patient's allergies indicates:   Allergen Reactions    Lipitor [atorvastatin]      Other reaction(s): Muscle pain       Health Maintenance  Health Maintenance Topics with due status: Not Due       Topic Last Completion Date    Cervical Cancer Screening 11/11/2019    Colorectal Cancer Screening 07/19/2022    Lipid Panel 05/05/2023    Diabetes Urine Screening 06/05/2023    Foot Exam 06/05/2023    Low Dose Statin 12/19/2023     Health Maintenance  Due   Topic Date Due    Eye Exam  09/23/2022    Mammogram  05/20/2023    COVID-19 Vaccine (7 - 2023-24 season) 09/01/2023    Hemoglobin A1c  11/05/2023       Medications:  Current Outpatient Medications on File Prior to Visit   Medication Sig Dispense Refill    albuterol-ipratropium (DUO-NEB) 2.5 mg-0.5 mg/3 mL nebulizer solution Take 3 mLs by nebulization every 6 (six) hours as needed for Wheezing. Rescue 1 Box 3    amLODIPine (NORVASC) 5 MG tablet TAKE 1 TABLET(5 MG) BY MOUTH EVERY DAY 90 tablet 3    blood sugar diagnostic Strp Once daily glucose testing. 50 strip 6    DULoxetine (CYMBALTA) 60 MG capsule TAKE 1 CAPSULE(60 MG) BY MOUTH EVERY DAY 90 capsule 0    fluticasone propionate (FLONASE) 50 mcg/actuation nasal spray 2 sprays (100 mcg total) by Each Nostril route once daily. 9.9 mL 1    ipratropium (ATROVENT) 0.02 % nebulizer solution Take 2.5 mLs (500 mcg total) by nebulization once. Rescue for 1 dose 1 vial 0    ketoconazole (NIZORAL) 2 % shampoo Apply topically.      lancets (LANCETS,THIN) Misc Once daily glucose testing. 50 each 6    meclizine (ANTIVERT) 25 mg tablet Take 1 tablet (25 mg total) by mouth 3 (three) times daily as needed for Dizziness. 30 tablet 0    meloxicam (MOBIC) 7.5 MG tablet Take 1 tablet (7.5 mg total) by mouth once daily. Take with a meal 15 tablet 0    montelukast (SINGULAIR) 10 mg tablet Take 1 tablet (10 mg total) by mouth every evening. 90 tablet 1    oxybutynin (DITROPAN) 5 MG Tab TAKE 1 TABLET(5 MG) BY MOUTH TWICE DAILY 180 tablet 0    polymyxin B sulf-trimethoprim (POLYTRIM) 10,000 unit- 1 mg/mL Drop Place 1 drop into both eyes every 6 (six) hours. 10 mL 0    potassium chloride SA (K-DUR,KLOR-CON M) 10 MEQ tablet TAKE 1 TABLET BY MOUTH EVERY DAY 90 tablet 0    pregabalin (LYRICA) 75 MG capsule Take 1 capsule (75 mg total) by mouth 2 (two) times daily. 60 capsule 1    rosuvastatin (CRESTOR) 40 MG Tab TAKE 1 TABLET(40 MG) BY MOUTH EVERY EVENING 90 tablet 0    tirzepatide 7.5  mg/0.5 mL PnIj Inject 7.5 mg into the skin every 7 days. 4 pen 2    TRUE METRIX AIR GLUCOSE METER Misc USE AS DIRECTED TO TEST BLOOD SUGAR ONCE DAILY 1 each 0       Medications have been reviewed and reconciled with patient at visit today.    Exam:  Vitals:    12/19/23 1107   BP: 130/82   Pulse: 95   Resp: 20   Temp: 96.8 °F (36 °C)     Weight: 105 kg (231 lb 7.7 oz)   Body mass index is 38.52 kg/m².      Physical Exam  Vitals reviewed.   Constitutional:       General: She is not in acute distress.     Appearance: She is well-developed. She is not ill-appearing.   Eyes:      General: No scleral icterus.  Cardiovascular:      Rate and Rhythm: Normal rate and regular rhythm.      Heart sounds: Normal heart sounds.   Pulmonary:      Effort: Pulmonary effort is normal. No respiratory distress.      Breath sounds: Normal breath sounds. No wheezing or rales.   Abdominal:      General: Bowel sounds are normal.      Palpations: Abdomen is soft.   Skin:     General: Skin is warm and dry.   Neurological:      Mental Status: She is alert and oriented to person, place, and time.   Psychiatric:         Behavior: Behavior normal.       Assessment:  The primary encounter diagnosis was Annual physical exam. Diagnoses of Hypertension goal BP (blood pressure) < 130/80, Hyperlipidemia LDL goal <70, Controlled type 2 diabetes mellitus without complication, without long-term current use of insulin, Mild intermittent asthma without complication, Sacroiliitis, Severe obesity (BMI 35.0-39.9) with comorbidity, and Need for vaccination were also pertinent to this visit.    Plan:  Annual physical exam  -     Counseled regarding age appropriate screenings and immunizations  -     Counseled regarding lifestyle modifications  -     CBC Auto Differential; Future; Expected date: 12/19/2023  -     Comprehensive Metabolic Panel; Future; Expected date: 12/19/2023  -     TSH; Future  -     Lipid panel; Future; Expected date: 12/19/2023  -      HEMOGLOBIN A1C; Future; Expected date: 12/19/2023    Hypertension goal BP (blood pressure) < 130/80  -     continue amlodipine    Hyperlipidemia LDL goal <70  -     continue rosuvastatin    Controlled type 2 diabetes mellitus without complication, without long-term current use of insulin  -     continue Mounjaro  -     recommend diabetic eye exam     Mild intermittent asthma without complication    Sacroiliitis  -     recommend f/u with pain management    Severe obesity (BMI 35.0-39.9) with comorbidity  -     Lifestyle modifications discussed    Need for vaccination  -     Influenza - Quadrivalent (PF)      Follow up in about 6 months (around 6/19/2024).

## 2023-12-19 NOTE — TELEPHONE ENCOUNTER
----- Message from Orly Juarez sent at 12/19/2023  1:44 PM CST -----  Contact: Leydi Holley called to make appointment. She is having a recurring problem with both feet.  Please call back [phone]

## 2023-12-21 ENCOUNTER — OFFICE VISIT (OUTPATIENT)
Dept: PODIATRY | Facility: CLINIC | Age: 58
End: 2023-12-21
Payer: COMMERCIAL

## 2023-12-21 VITALS — BODY MASS INDEX: 38.57 KG/M2 | WEIGHT: 231.5 LBS | HEIGHT: 65 IN

## 2023-12-21 DIAGNOSIS — M21.6X1 ACQUIRED EQUINUS DEFORMITY OF BOTH FEET: ICD-10-CM

## 2023-12-21 DIAGNOSIS — M79.671 INFLAMMATORY HEEL PAIN, RIGHT: ICD-10-CM

## 2023-12-21 DIAGNOSIS — M21.6X2 ACQUIRED EQUINUS DEFORMITY OF BOTH FEET: ICD-10-CM

## 2023-12-21 DIAGNOSIS — M72.2 PLANTAR FASCIITIS: Primary | ICD-10-CM

## 2023-12-21 PROCEDURE — 3061F NEG MICROALBUMINURIA REV: CPT | Mod: CPTII,S$GLB,, | Performed by: PODIATRIST

## 2023-12-21 PROCEDURE — 3066F PR DOCUMENTATION OF TREATMENT FOR NEPHROPATHY: ICD-10-PCS | Mod: CPTII,S$GLB,, | Performed by: PODIATRIST

## 2023-12-21 PROCEDURE — 99999 PR PBB SHADOW E&M-EST. PATIENT-LVL III: CPT | Mod: PBBFAC,,, | Performed by: PODIATRIST

## 2023-12-21 PROCEDURE — 1160F PR REVIEW ALL MEDS BY PRESCRIBER/CLIN PHARMACIST DOCUMENTED: ICD-10-PCS | Mod: CPTII,S$GLB,, | Performed by: PODIATRIST

## 2023-12-21 PROCEDURE — 99999 PR PBB SHADOW E&M-EST. PATIENT-LVL III: ICD-10-PCS | Mod: PBBFAC,,, | Performed by: PODIATRIST

## 2023-12-21 PROCEDURE — 3008F BODY MASS INDEX DOCD: CPT | Mod: CPTII,S$GLB,, | Performed by: PODIATRIST

## 2023-12-21 PROCEDURE — 3044F HG A1C LEVEL LT 7.0%: CPT | Mod: CPTII,S$GLB,, | Performed by: PODIATRIST

## 2023-12-21 PROCEDURE — 99213 OFFICE O/P EST LOW 20 MIN: CPT | Mod: 25,S$GLB,, | Performed by: PODIATRIST

## 2023-12-21 PROCEDURE — 3061F PR NEG MICROALBUMINURIA RESULT DOCUMENTED/REVIEW: ICD-10-PCS | Mod: CPTII,S$GLB,, | Performed by: PODIATRIST

## 2023-12-21 PROCEDURE — 3008F PR BODY MASS INDEX (BMI) DOCUMENTED: ICD-10-PCS | Mod: CPTII,S$GLB,, | Performed by: PODIATRIST

## 2023-12-21 PROCEDURE — 20550 PR INJECT TENDON SHEATH/LIGAMENT: ICD-10-PCS | Mod: RT,S$GLB,, | Performed by: PODIATRIST

## 2023-12-21 PROCEDURE — 1159F MED LIST DOCD IN RCRD: CPT | Mod: CPTII,S$GLB,, | Performed by: PODIATRIST

## 2023-12-21 PROCEDURE — 3044F PR MOST RECENT HEMOGLOBIN A1C LEVEL <7.0%: ICD-10-PCS | Mod: CPTII,S$GLB,, | Performed by: PODIATRIST

## 2023-12-21 PROCEDURE — 99213 PR OFFICE/OUTPT VISIT, EST, LEVL III, 20-29 MIN: ICD-10-PCS | Mod: 25,S$GLB,, | Performed by: PODIATRIST

## 2023-12-21 PROCEDURE — 3066F NEPHROPATHY DOC TX: CPT | Mod: CPTII,S$GLB,, | Performed by: PODIATRIST

## 2023-12-21 PROCEDURE — 1160F RVW MEDS BY RX/DR IN RCRD: CPT | Mod: CPTII,S$GLB,, | Performed by: PODIATRIST

## 2023-12-21 PROCEDURE — 1159F PR MEDICATION LIST DOCUMENTED IN MEDICAL RECORD: ICD-10-PCS | Mod: CPTII,S$GLB,, | Performed by: PODIATRIST

## 2023-12-21 PROCEDURE — 20550 NJX 1 TENDON SHEATH/LIGAMENT: CPT | Mod: RT,S$GLB,, | Performed by: PODIATRIST

## 2023-12-21 RX ORDER — TRIAMCINOLONE ACETONIDE 40 MG/ML
40 INJECTION, SUSPENSION INTRA-ARTICULAR; INTRAMUSCULAR
Status: COMPLETED | OUTPATIENT
Start: 2023-12-21 | End: 2023-12-21

## 2023-12-21 RX ORDER — METHYLPREDNISOLONE 4 MG/1
4 TABLET ORAL DAILY
Qty: 1 EACH | Refills: 0 | Status: SHIPPED | OUTPATIENT
Start: 2023-12-21

## 2023-12-21 RX ORDER — METHOCARBAMOL 500 MG/1
500 TABLET, FILM COATED ORAL NIGHTLY PRN
Qty: 30 TABLET | Refills: 0 | Status: SHIPPED | OUTPATIENT
Start: 2023-12-21

## 2023-12-21 RX ADMIN — TRIAMCINOLONE ACETONIDE 40 MG: 40 INJECTION, SUSPENSION INTRA-ARTICULAR; INTRAMUSCULAR at 10:12

## 2023-12-21 NOTE — PROGRESS NOTES
Subjective:     Patient ID: Leydi Quintero is a 58 y.o. female.    Chief Complaint: Ankle Pain (C/o states BL ankle pain, rates pain 8/10, wearing tennis shoes (possible inj), diabetic pt, last seen PCP Dr. Chavez 12/19/23.)    Leydi is a 58 y.o. female who presents to the clinic complaining of ankle pain in both feet, especially with the first step in the morning. The pain is described as Aching, Throbbing, and Tight. The onset of the pain was gradual and has worsened over the past several months. Leydi rates the pain as 8/10. She denies a history of trauma. Prior treatments include injection helped last time. Patient points to bilateral ankle. Patient has no other pedal complaints at this time.     Patient Active Problem List   Diagnosis    Hyperlipidemia LDL goal <70    Hypertension goal BP (blood pressure) < 130/80    Obesity (BMI 30-39.9)    Controlled type 2 diabetes mellitus without complication, without long-term current use of insulin    Mild intermittent asthma without complication    Sacroiliitis       Medication List with Changes/Refills   New Medications    METHOCARBAMOL (ROBAXIN) 500 MG TAB    Take 1 tablet (500 mg total) by mouth nightly as needed.    METHYLPREDNISOLONE (MEDROL DOSEPACK) 4 MG TABLET    Take 1 tablet (4 mg total) by mouth once daily. Use as instructed on dose pack   Current Medications    ALBUTEROL-IPRATROPIUM (DUO-NEB) 2.5 MG-0.5 MG/3 ML NEBULIZER SOLUTION    Take 3 mLs by nebulization every 6 (six) hours as needed for Wheezing. Rescue    AMLODIPINE (NORVASC) 5 MG TABLET    TAKE 1 TABLET(5 MG) BY MOUTH EVERY DAY    BLOOD SUGAR DIAGNOSTIC STRP    Once daily glucose testing.    DULOXETINE (CYMBALTA) 60 MG CAPSULE    TAKE 1 CAPSULE(60 MG) BY MOUTH EVERY DAY    FLUTICASONE PROPIONATE (FLONASE) 50 MCG/ACTUATION NASAL SPRAY    2 sprays (100 mcg total) by Each Nostril route once daily.    IPRATROPIUM (ATROVENT) 0.02 % NEBULIZER SOLUTION    Take 2.5 mLs (500 mcg total) by  nebulization once. Rescue for 1 dose    KETOCONAZOLE (NIZORAL) 2 % SHAMPOO    Apply topically.    LANCETS (LANCETS,THIN) MISC    Once daily glucose testing.    MECLIZINE (ANTIVERT) 25 MG TABLET    Take 1 tablet (25 mg total) by mouth 3 (three) times daily as needed for Dizziness.    MELOXICAM (MOBIC) 7.5 MG TABLET    Take 1 tablet (7.5 mg total) by mouth once daily. Take with a meal    MONTELUKAST (SINGULAIR) 10 MG TABLET    Take 1 tablet (10 mg total) by mouth every evening.    OXYBUTYNIN (DITROPAN) 5 MG TAB    TAKE 1 TABLET(5 MG) BY MOUTH TWICE DAILY    POLYMYXIN B SULF-TRIMETHOPRIM (POLYTRIM) 10,000 UNIT- 1 MG/ML DROP    Place 1 drop into both eyes every 6 (six) hours.    POTASSIUM CHLORIDE SA (K-DUR,KLOR-CON M) 10 MEQ TABLET    TAKE 1 TABLET BY MOUTH EVERY DAY    PREGABALIN (LYRICA) 75 MG CAPSULE    Take 1 capsule (75 mg total) by mouth 2 (two) times daily.    ROSUVASTATIN (CRESTOR) 40 MG TAB    TAKE 1 TABLET(40 MG) BY MOUTH EVERY EVENING    TIRZEPATIDE 7.5 MG/0.5 ML PNIJ    Inject 7.5 mg into the skin every 7 days.    TRUE METRIX AIR GLUCOSE METER MISC    USE AS DIRECTED TO TEST BLOOD SUGAR ONCE DAILY       Review of patient's allergies indicates:   Allergen Reactions    Lipitor [atorvastatin]      Other reaction(s): Muscle pain       Past Surgical History:   Procedure Laterality Date    COLONOSCOPY N/A 07/19/2022    Procedure: COLONOSCOPY;  Surgeon: Elisabeth Rodriguez MD;  Location: [facility];  Service: Endoscopy;  Laterality: N/A;    FOOT SURGERY  at 11y/o    arch lift    INJECTION OF ANESTHETIC AGENT AROUND MEDIAL BRANCH NERVES INNERVATING LUMBAR FACET JOINT Bilateral 07/28/2022    Procedure: diagnostic bilateral L3-5 MBB;  Surgeon: Nick Kruger MD;  Location: [facility];  Service: Pain Management;  Laterality: Bilateral;    INJECTION OF ANESTHETIC AGENT INTO SACROILIAC JOINT Bilateral 06/16/2022    Procedure: Bilateral SIJ Injection;  Surgeon: Sebastien Cameron MD;  Location: [facility];  Service:  "Pain Management;  Laterality: Bilateral;       Family History   Problem Relation Age of Onset    Diabetes Mother     Hypertension Mother     Hypertension Father     Hyperlipidemia Father     Breast cancer Sister 58    Colon cancer Neg Hx     Ovarian cancer Neg Hx        Social History     Socioeconomic History    Marital status:     Number of children: 1   Occupational History     Employer: [employer]   Tobacco Use    Smoking status: Never    Smokeless tobacco: Never   Substance and Sexual Activity    Alcohol use: No    Drug use: No    Sexual activity: Yes     Partners: Male     Birth control/protection: Post-menopausal       Vitals:    12/21/23 1000   Weight: 105 kg (231 lb 7.7 oz)   Height: 5' 5" (1.651 m)   PainSc:   8       Hemoglobin A1C   Date Value Ref Range Status   12/19/2023 6.0 (H) 4.0 - 5.6 % Final     Comment:     ADA Screening Guidelines:  5.7-6.4%  Consistent with prediabetes  >or=6.5%  Consistent with diabetes    High levels of fetal hemoglobin interfere with the HbA1C  assay. Heterozygous hemoglobin variants (HbS, HgC, etc)do  not significantly interfere with this assay.   However, presence of multiple variants may affect accuracy.     05/05/2023 6.3 (H) 4.0 - 5.6 % Final     Comment:     ADA Screening Guidelines:  5.7-6.4%  Consistent with prediabetes  >or=6.5%  Consistent with diabetes    High levels of fetal hemoglobin interfere with the HbA1C  assay. Heterozygous hemoglobin variants (HbS, HgC, etc)do  not significantly interfere with this assay.   However, presence of multiple variants may affect accuracy.     10/26/2022 5.9 (H) 4.0 - 5.6 % Final     Comment:     ADA Screening Guidelines:  5.7-6.4%  Consistent with prediabetes  >or=6.5%  Consistent with diabetes    High levels of fetal hemoglobin interfere with the HbA1C  assay. Heterozygous hemoglobin variants (HbS, HgC, etc)do  not significantly interfere with this assay.   However, presence of multiple variants may affect " accuracy.         Review of Systems   Constitutional:  Negative for chills and fever.   Respiratory:  Negative for shortness of breath.    Cardiovascular:  Negative for chest pain, palpitations, orthopnea, claudication and leg swelling.   Gastrointestinal:  Negative for diarrhea, nausea and vomiting.   Musculoskeletal:  Negative for joint pain.   Skin:  Negative for rash.   Neurological:  Negative for dizziness, tingling, sensory change, focal weakness and weakness.   Psychiatric/Behavioral: Negative.           Objective:      PHYSICAL EXAM: Apperance: Alert and orient in no distress,well developed, and with good attention to grooming and body habits  Patient presents ambulating in tennis shoes.   Lower Extremity Exam  VASCULAR: Dorsalis pedis pulses 2/4 bilateral and Posterior Tibial pulses 2/4 bilateral.   DERMATOLOGICAL: No skin rashes, subcutaneous nodules, lesions, or ulcers observed bilateral.   NEUROLOGICAL: Light touch, sharp-dull, proprioception all present and equal bilaterally.    MUSCULOSKELETAL: Muscle strength 5/5 for all foot inverters, everters, plantarflexors, and dorsiflexors bilateral. Ankle joints bilateral shows decreased ROM. bilateral ankle ROM shows greater decrease in dorsiflexion with knee extended. Ankle joint ROM is pain free and without crepitus bilateral. Pain to palpation bilateral plantar medial tubercle. Plantar medial aspect of bilateral heels shows tenderness to palpation. No pain on medial-lateral compression of the calcaneus.        Assessment:       ICD-10-CM ICD-9-CM   1. Plantar fasciitis  M72.2 728.71   2. Acquired equinus deformity of both feet  M21.6X1 736.72    M21.6X2    3. Inflammatory heel pain, right - Right Foot  M79.671 729.5       Plan:   Plantar fasciitis  -     methylPREDNISolone (MEDROL DOSEPACK) 4 mg tablet; Take 1 tablet (4 mg total) by mouth once daily. Use as instructed on dose pack  Dispense: 1 each; Refill: 0  -     methocarbamoL (ROBAXIN) 500 MG Tab; Take  1 tablet (500 mg total) by mouth nightly as needed.  Dispense: 30 tablet; Refill: 0  -     triamcinolone acetonide injection 40 mg    Acquired equinus deformity of both feet  -     methylPREDNISolone (MEDROL DOSEPACK) 4 mg tablet; Take 1 tablet (4 mg total) by mouth once daily. Use as instructed on dose pack  Dispense: 1 each; Refill: 0  -     methocarbamoL (ROBAXIN) 500 MG Tab; Take 1 tablet (500 mg total) by mouth nightly as needed.  Dispense: 30 tablet; Refill: 0    Inflammatory heel pain, right - Right Foot      I counseled the patient on her conditions, regarding findings of my examination, my impressions, and usual treatment plan.   Patient agreed to injection therapy today. After sterilizing the area of right medial heel with an alcohol prep, the affected area was injected with a solution containing 1 cc of 1% lidocaine plain, 1cc of 0.5% Marcaine plain and 1 cc of Kenalog 40%.  Injection area was then covered with band-aid. The patient tolerated the injection well and reported comfort to the area.  Prescribed Medrol Dosepak to be taken as directed on package. Discussed possible increase in blood sugar with taking steroid medication. Patient advised on the possible elevation of blood pressure sugar and caution to take pills as prescribed and to discontinue use if symptoms arise, patient agreed.  Prescribed Robxain 500mg to be taken as needed.   Patient to return as needed.        Lety Downey DPM  [facility]

## 2023-12-30 DIAGNOSIS — F32.A ANXIETY AND DEPRESSION: ICD-10-CM

## 2023-12-30 DIAGNOSIS — F41.9 ANXIETY AND DEPRESSION: ICD-10-CM

## 2023-12-30 NOTE — TELEPHONE ENCOUNTER
No care due was identified.  [facility] Embedded Care Due Messages. Reference number: [id number].   12/30/2023 2:03:40 PM CST

## 2024-01-01 RX ORDER — DULOXETIN HYDROCHLORIDE 60 MG/1
CAPSULE, DELAYED RELEASE ORAL
Qty: 90 CAPSULE | Refills: 3 | Status: SHIPPED | OUTPATIENT
Start: 2024-01-01

## 2024-01-01 NOTE — TELEPHONE ENCOUNTER
Refill Decision Note   Leydi Quintero  is requesting a refill authorization.  Brief Assessment and Rationale for Refill:  Approve     Medication Therapy Plan:         Comments:     Note composed:12:28 PM 01/01/2024

## 2024-01-24 ENCOUNTER — HOSPITAL ENCOUNTER (OUTPATIENT)
Dept: RADIOLOGY | Facility: HOSPITAL | Age: 59
Discharge: HOME | End: 2024-01-24
Attending: PODIATRIST
Payer: COMMERCIAL

## 2024-01-24 ENCOUNTER — OFFICE VISIT (OUTPATIENT)
Dept: PODIATRY | Facility: CLINIC | Age: 59
End: 2024-01-24
Payer: COMMERCIAL

## 2024-01-24 ENCOUNTER — PATIENT MESSAGE (OUTPATIENT)
Dept: PODIATRY | Facility: CLINIC | Age: 59
End: 2024-01-24

## 2024-01-24 VITALS — HEIGHT: 65 IN | BODY MASS INDEX: 38.57 KG/M2 | WEIGHT: 231.5 LBS

## 2024-01-24 DIAGNOSIS — M25.572 CHRONIC PAIN OF LEFT ANKLE: ICD-10-CM

## 2024-01-24 DIAGNOSIS — G89.29 CHRONIC PAIN OF LEFT ANKLE: ICD-10-CM

## 2024-01-24 DIAGNOSIS — M25.572 CHRONIC PAIN OF LEFT ANKLE: Primary | ICD-10-CM

## 2024-01-24 DIAGNOSIS — G89.29 CHRONIC PAIN OF LEFT ANKLE: Primary | ICD-10-CM

## 2024-01-24 DIAGNOSIS — M12.572 TRAUMATIC ARTHRITIS OF ANKLE, LEFT: ICD-10-CM

## 2024-01-24 PROCEDURE — 1160F RVW MEDS BY RX/DR IN RCRD: CPT | Mod: CPTII,S$GLB,, | Performed by: PODIATRIST

## 2024-01-24 PROCEDURE — 99213 OFFICE O/P EST LOW 20 MIN: CPT | Mod: S$GLB,,, | Performed by: PODIATRIST

## 2024-01-24 PROCEDURE — 73610 X-RAY EXAM OF ANKLE: CPT | Mod: TC,LT

## 2024-01-24 PROCEDURE — 3008F BODY MASS INDEX DOCD: CPT | Mod: CPTII,S$GLB,, | Performed by: PODIATRIST

## 2024-01-24 PROCEDURE — 73630 X-RAY EXAM OF FOOT: CPT | Mod: TC,LT

## 2024-01-24 PROCEDURE — 1159F MED LIST DOCD IN RCRD: CPT | Mod: CPTII,S$GLB,, | Performed by: PODIATRIST

## 2024-01-24 PROCEDURE — 73630 X-RAY EXAM OF FOOT: CPT | Mod: 26,LT,, | Performed by: RADIOLOGY

## 2024-01-24 PROCEDURE — 99999 PR PBB SHADOW E&M-EST. PATIENT-LVL IV: CPT | Mod: PBBFAC,,, | Performed by: PODIATRIST

## 2024-01-24 PROCEDURE — 73610 X-RAY EXAM OF ANKLE: CPT | Mod: 26,LT,, | Performed by: RADIOLOGY

## 2024-01-24 NOTE — PROGRESS NOTES
Subjective:     Patient ID: Leydi Quintero is a 58 y.o. female.    Chief Complaint: Follow-up (Ankle pain (pt states left ankle still in pain, rates pain 7/10, wearing casual shoes, diabetic pt, last seen pcp Dr. Chavez 12/19/23.))    Leydi is a 58 y.o. female who presents to the clinic complaining of ankle pain in in left. The pain is described as Aching, Throbbing, and Tight. The onset of the pain was gradual and has worsened over the past several months. Leydi rates the pain as 7/10. She denies a history of trauma. Prior treatments include oral steroid and oral muscle relaxer with minimal relief. Patient points to left medial ankle. Patient has no other pedal complaints at this time.     Patient Active Problem List   Diagnosis    Hyperlipidemia LDL goal <70    Hypertension goal BP (blood pressure) < 130/80    Obesity (BMI 30-39.9)    Controlled type 2 diabetes mellitus without complication, without long-term current use of insulin    Mild intermittent asthma without complication    Sacroiliitis       Medication List with Changes/Refills   Current Medications    ALBUTEROL-IPRATROPIUM (DUO-NEB) 2.5 MG-0.5 MG/3 ML NEBULIZER SOLUTION    Take 3 mLs by nebulization every 6 (six) hours as needed for Wheezing. Rescue    AMLODIPINE (NORVASC) 5 MG TABLET    TAKE 1 TABLET(5 MG) BY MOUTH EVERY DAY    BLOOD SUGAR DIAGNOSTIC STRP    Once daily glucose testing.    DULOXETINE (CYMBALTA) 60 MG CAPSULE    TAKE 1 CAPSULE(60 MG) BY MOUTH EVERY DAY    FLUTICASONE PROPIONATE (FLONASE) 50 MCG/ACTUATION NASAL SPRAY    2 sprays (100 mcg total) by Each Nostril route once daily.    IPRATROPIUM (ATROVENT) 0.02 % NEBULIZER SOLUTION    Take 2.5 mLs (500 mcg total) by nebulization once. Rescue for 1 dose    KETOCONAZOLE (NIZORAL) 2 % SHAMPOO    Apply topically.    LANCETS (LANCETS,THIN) MISC    Once daily glucose testing.    MECLIZINE (ANTIVERT) 25 MG TABLET    Take 1 tablet (25 mg total) by mouth 3 (three) times daily as needed  for Dizziness.    MELOXICAM (MOBIC) 7.5 MG TABLET    Take 1 tablet (7.5 mg total) by mouth once daily. Take with a meal    METHOCARBAMOL (ROBAXIN) 500 MG TAB    Take 1 tablet (500 mg total) by mouth nightly as needed.    METHYLPREDNISOLONE (MEDROL DOSEPACK) 4 MG TABLET    Take 1 tablet (4 mg total) by mouth once daily. Use as instructed on dose pack    MONTELUKAST (SINGULAIR) 10 MG TABLET    Take 1 tablet (10 mg total) by mouth every evening.    OXYBUTYNIN (DITROPAN) 5 MG TAB    TAKE 1 TABLET(5 MG) BY MOUTH TWICE DAILY    POLYMYXIN B SULF-TRIMETHOPRIM (POLYTRIM) 10,000 UNIT- 1 MG/ML DROP    Place 1 drop into both eyes every 6 (six) hours.    POTASSIUM CHLORIDE SA (K-DUR,KLOR-CON M) 10 MEQ TABLET    TAKE 1 TABLET BY MOUTH EVERY DAY    PREGABALIN (LYRICA) 75 MG CAPSULE    Take 1 capsule (75 mg total) by mouth 2 (two) times daily.    ROSUVASTATIN (CRESTOR) 40 MG TAB    TAKE 1 TABLET(40 MG) BY MOUTH EVERY EVENING    TIRZEPATIDE (MOUNJARO) 7.5 MG/0.5 ML PNIJ    INJECT 7.5 MG UNDER THE SKIN EVERY 7 DAYS    TRUE METRIX AIR GLUCOSE METER MISC    USE AS DIRECTED TO TEST BLOOD SUGAR ONCE DAILY       Review of patient's allergies indicates:   Allergen Reactions    Lipitor [atorvastatin]      Other reaction(s): Muscle pain       Past Surgical History:   Procedure Laterality Date    COLONOSCOPY N/A 07/19/2022    Procedure: COLONOSCOPY;  Surgeon: Elisabeth Rodriguez MD;  Location: [facility];  Service: Endoscopy;  Laterality: N/A;    FOOT SURGERY  at 11y/o    arch lift    INJECTION OF ANESTHETIC AGENT AROUND MEDIAL BRANCH NERVES INNERVATING LUMBAR FACET JOINT Bilateral 07/28/2022    Procedure: diagnostic bilateral L3-5 MBB;  Surgeon: Nick Kruger MD;  Location: [facility];  Service: Pain Management;  Laterality: Bilateral;    INJECTION OF ANESTHETIC AGENT INTO SACROILIAC JOINT Bilateral 06/16/2022    Procedure: Bilateral SIJ Injection;  Surgeon: Sebastien Cameron MD;  Location: [facility];  Service: Pain Management;   "Laterality: Bilateral;       Family History   Problem Relation Age of Onset    Diabetes Mother     Hypertension Mother     Hypertension Father     Hyperlipidemia Father     Breast cancer Sister 58    Colon cancer Neg Hx     Ovarian cancer Neg Hx        Social History     Socioeconomic History    Marital status:     Number of children: 1   Occupational History     Employer: [employer]   Tobacco Use    Smoking status: Never    Smokeless tobacco: Never   Substance and Sexual Activity    Alcohol use: No    Drug use: No    Sexual activity: Yes     Partners: Male     Birth control/protection: Post-menopausal       Vitals:    01/24/24 0908   Weight: 105 kg (231 lb 7.7 oz)   Height: 5' 5" (1.651 m)   PainSc:   7       Hemoglobin A1C   Date Value Ref Range Status   12/19/2023 6.0 (H) 4.0 - 5.6 % Final     Comment:     ADA Screening Guidelines:  5.7-6.4%  Consistent with prediabetes  >or=6.5%  Consistent with diabetes    High levels of fetal hemoglobin interfere with the HbA1C  assay. Heterozygous hemoglobin variants (HbS, HgC, etc)do  not significantly interfere with this assay.   However, presence of multiple variants may affect accuracy.     05/05/2023 6.3 (H) 4.0 - 5.6 % Final     Comment:     ADA Screening Guidelines:  5.7-6.4%  Consistent with prediabetes  >or=6.5%  Consistent with diabetes    High levels of fetal hemoglobin interfere with the HbA1C  assay. Heterozygous hemoglobin variants (HbS, HgC, etc)do  not significantly interfere with this assay.   However, presence of multiple variants may affect accuracy.     10/26/2022 5.9 (H) 4.0 - 5.6 % Final     Comment:     ADA Screening Guidelines:  5.7-6.4%  Consistent with prediabetes  >or=6.5%  Consistent with diabetes    High levels of fetal hemoglobin interfere with the HbA1C  assay. Heterozygous hemoglobin variants (HbS, HgC, etc)do  not significantly interfere with this assay.   However, presence of multiple variants may affect accuracy.   "       Review of Systems   Constitutional:  Negative for chills and fever.   Respiratory:  Negative for shortness of breath.    Cardiovascular:  Negative for chest pain, palpitations, orthopnea, claudication and leg swelling.   Gastrointestinal:  Negative for diarrhea, nausea and vomiting.   Musculoskeletal:  Negative for joint pain.   Skin:  Negative for rash.   Neurological:  Negative for dizziness, tingling, sensory change, focal weakness and weakness.   Psychiatric/Behavioral: Negative.           Objective:      PHYSICAL EXAM: Apperance: Alert and orient in no distress,well developed, and with good attention to grooming and body habits  Patient presents ambulating in casual shoes.   Lower Extremity Exam  VASCULAR: Dorsalis pedis pulses 2/4 bilateral and Posterior Tibial pulses 2/4 bilateral.   DERMATOLOGICAL: No skin rashes, subcutaneous nodules, lesions, or ulcers observed bilateral.   NEUROLOGICAL: Light touch, sharp-dull, proprioception all present and equal bilaterally.    MUSCULOSKELETAL: Muscle strength 5/5 for all foot inverters, everters, plantarflexors, and dorsiflexors bilateral. Ankle joints bilateral shows decreased ROM. Pain on palpation of left medial ankle along the medial malleoli and posterior tibial tendon. No pain on left ankle eversion or inversion.         Assessment:       ICD-10-CM ICD-9-CM   1. Chronic pain of left ankle - Left Foot  M25.572 719.47    G89.29 338.29   2. Traumatic arthritis of ankle, left  M12.572 716.17         Plan:   Chronic pain of left ankle - Left Foot  -     X-Ray Ankle Complete Left; Future; Expected date: 01/24/2024  -     X-Ray Foot Complete Left; Future; Expected date: 01/24/2024    Traumatic arthritis of ankle, left    I counseled the patient on her conditions, regarding findings of my examination, my impressions, and usual treatment plan.   Patient was fitted with lace up ankle brace and instructed on proper usage. This should be worn daily for a minimum of 2  weeks at all times when ambulating.  Ordered left ankle and foot x-rays to be completed today.   Patient instructed to wear ankle brace for at least 2 weeks and send mychart for update with possible MRI to be ordered.   Patient to return in 4 weeks or sooner if needed.        Lety Downey DPM  [facility]

## 2024-01-31 DIAGNOSIS — G89.29 CHRONIC PAIN OF LEFT ANKLE: Primary | ICD-10-CM

## 2024-01-31 DIAGNOSIS — M12.572 TRAUMATIC ARTHRITIS OF ANKLE, LEFT: ICD-10-CM

## 2024-01-31 DIAGNOSIS — M25.572 CHRONIC PAIN OF LEFT ANKLE: Primary | ICD-10-CM

## 2024-01-31 RX ORDER — TIZANIDINE 4 MG/1
4 TABLET ORAL EVERY 6 HOURS PRN
Qty: 30 TABLET | Refills: 0 | Status: SHIPPED | OUTPATIENT
Start: 2024-01-31

## 2024-02-05 ENCOUNTER — HOSPITAL ENCOUNTER (OUTPATIENT)
Dept: RADIOLOGY | Facility: HOSPITAL | Age: 59
Discharge: HOME | End: 2024-02-05
Attending: PHYSICIAN ASSISTANT
Payer: COMMERCIAL

## 2024-02-05 PROCEDURE — 77067 SCR MAMMO BI INCL CAD: CPT | Mod: 26,,, | Performed by: RADIOLOGY

## 2024-02-05 PROCEDURE — 77063 BREAST TOMOSYNTHESIS BI: CPT | Mod: 26,,, | Performed by: RADIOLOGY

## 2024-02-05 PROCEDURE — 77067 SCR MAMMO BI INCL CAD: CPT | Mod: TC

## 2024-02-22 ENCOUNTER — OFFICE VISIT (OUTPATIENT)
Dept: PODIATRY | Facility: CLINIC | Age: 59
End: 2024-02-22
Payer: COMMERCIAL

## 2024-02-22 VITALS — BODY MASS INDEX: 38.57 KG/M2 | HEIGHT: 65 IN | WEIGHT: 231.5 LBS

## 2024-02-22 DIAGNOSIS — M25.572 PAIN OF JOINT OF LEFT ANKLE AND FOOT: Primary | ICD-10-CM

## 2024-02-22 DIAGNOSIS — G89.29 CHRONIC PAIN OF LEFT ANKLE: ICD-10-CM

## 2024-02-22 DIAGNOSIS — M25.572 CHRONIC PAIN OF LEFT ANKLE: ICD-10-CM

## 2024-02-22 DIAGNOSIS — M12.572 TRAUMATIC ARTHRITIS OF ANKLE, LEFT: ICD-10-CM

## 2024-02-22 PROCEDURE — 1159F MED LIST DOCD IN RCRD: CPT | Mod: CPTII,S$GLB,, | Performed by: PODIATRIST

## 2024-02-22 PROCEDURE — 99213 OFFICE O/P EST LOW 20 MIN: CPT | Mod: S$GLB,,, | Performed by: PODIATRIST

## 2024-02-22 PROCEDURE — 1160F RVW MEDS BY RX/DR IN RCRD: CPT | Mod: CPTII,S$GLB,, | Performed by: PODIATRIST

## 2024-02-22 PROCEDURE — 3008F BODY MASS INDEX DOCD: CPT | Mod: CPTII,S$GLB,, | Performed by: PODIATRIST

## 2024-02-22 PROCEDURE — 99999 PR PBB SHADOW E&M-EST. PATIENT-LVL IV: CPT | Mod: PBBFAC,,, | Performed by: PODIATRIST

## 2024-03-05 ENCOUNTER — PATIENT MESSAGE (OUTPATIENT)
Dept: PODIATRY | Facility: CLINIC | Age: 59
End: 2024-03-05
Payer: COMMERCIAL

## 2024-03-07 ENCOUNTER — TELEPHONE (OUTPATIENT)
Dept: PODIATRY | Facility: CLINIC | Age: 59
End: 2024-03-07
Payer: COMMERCIAL

## 2024-03-07 DIAGNOSIS — G89.29 CHRONIC PAIN OF LEFT ANKLE: Primary | ICD-10-CM

## 2024-03-07 DIAGNOSIS — M25.572 CHRONIC PAIN OF LEFT ANKLE: Primary | ICD-10-CM

## 2024-03-07 NOTE — TELEPHONE ENCOUNTER
Spoke with the patient and scheduled her for 3-15-24 at 7:00 for lab work.           ----- Message from RT Kofi sent at 3/7/2024 12:52 PM CST -----  Regarding: ASAP Creatinine  Patient has an upcoming appointment on 03/15 at 8 am for an MRI.  Please order a Creatinine on patient ASAP prior to appointment day or at least 1 hr. prior to appointment time.    Thank you--

## 2024-03-11 ENCOUNTER — PATIENT MESSAGE (OUTPATIENT)
Dept: ADMINISTRATIVE | Facility: OTHER | Age: 59
End: 2024-03-11
Payer: COMMERCIAL

## 2024-03-11 ENCOUNTER — TELEPHONE (OUTPATIENT)
Dept: INTERNAL MEDICINE | Facility: CLINIC | Age: 59
End: 2024-03-11
Payer: COMMERCIAL

## 2024-03-13 DIAGNOSIS — M12.572 TRAUMATIC ARTHRITIS OF ANKLE, LEFT: Primary | ICD-10-CM

## 2024-03-15 ENCOUNTER — HOSPITAL ENCOUNTER (OUTPATIENT)
Dept: RADIOLOGY | Facility: HOSPITAL | Age: 59
Discharge: HOME | End: 2024-03-15
Attending: PODIATRIST
Payer: COMMERCIAL

## 2024-03-15 DIAGNOSIS — G89.29 CHRONIC PAIN OF LEFT ANKLE: ICD-10-CM

## 2024-03-15 DIAGNOSIS — M25.572 PAIN OF JOINT OF LEFT ANKLE AND FOOT: ICD-10-CM

## 2024-03-15 DIAGNOSIS — M12.572 TRAUMATIC ARTHRITIS OF ANKLE, LEFT: ICD-10-CM

## 2024-03-15 DIAGNOSIS — M25.572 CHRONIC PAIN OF LEFT ANKLE: ICD-10-CM

## 2024-03-15 PROCEDURE — 73723 MRI JOINT LWR EXTR W/O&W/DYE: CPT | Mod: TC,LT

## 2024-03-15 PROCEDURE — A9585 GADOBUTROL INJECTION: HCPCS | Performed by: PODIATRIST

## 2024-03-15 PROCEDURE — 73723 MRI JOINT LWR EXTR W/O&W/DYE: CPT | Mod: 26,LT,, | Performed by: RADIOLOGY

## 2024-03-15 PROCEDURE — 25500020 PHARM REV CODE 255: Performed by: PODIATRIST

## 2024-03-15 RX ORDER — GADOBUTROL 604.72 MG/ML
10 INJECTION INTRAVENOUS
Status: COMPLETED | OUTPATIENT
Start: 2024-03-15 | End: 2024-03-15

## 2024-03-15 RX ADMIN — GADOBUTROL 10 ML: 604.72 INJECTION INTRAVENOUS at 08:03

## 2024-03-21 ENCOUNTER — OFFICE VISIT (OUTPATIENT)
Dept: PODIATRY | Facility: CLINIC | Age: 59
End: 2024-03-21
Payer: COMMERCIAL

## 2024-03-21 VITALS — HEIGHT: 65 IN | WEIGHT: 231.5 LBS | BODY MASS INDEX: 38.57 KG/M2

## 2024-03-21 DIAGNOSIS — M65.9 TENOSYNOVITIS OF TIBIALIS POSTERIOR TENDON: ICD-10-CM

## 2024-03-21 DIAGNOSIS — M66.872 TIBIALIS POSTERIOR TENDON TEAR, NONTRAUMATIC, LEFT: Primary | ICD-10-CM

## 2024-03-21 PROCEDURE — 1159F MED LIST DOCD IN RCRD: CPT | Mod: CPTII,S$GLB,, | Performed by: PODIATRIST

## 2024-03-21 PROCEDURE — 3008F BODY MASS INDEX DOCD: CPT | Mod: CPTII,S$GLB,, | Performed by: PODIATRIST

## 2024-03-21 PROCEDURE — 1160F RVW MEDS BY RX/DR IN RCRD: CPT | Mod: CPTII,S$GLB,, | Performed by: PODIATRIST

## 2024-03-21 PROCEDURE — 99213 OFFICE O/P EST LOW 20 MIN: CPT | Mod: S$GLB,,, | Performed by: PODIATRIST

## 2024-03-21 PROCEDURE — 99999 PR PBB SHADOW E&M-EST. PATIENT-LVL IV: CPT | Mod: PBBFAC,,, | Performed by: PODIATRIST

## 2024-03-21 NOTE — PROGRESS NOTES
Subjective:     Patient ID: Leydi Quintero is a 58 y.o. female.    Chief Complaint: Follow-up (Left ankle MRI f/u , pt c/o 6 /10 pain, diabetic pt wears slippers, PCP Dr. Chavez last seen 12-19-23) and Ankle Pain    Leydi is a 58 y.o. female who presents to the clinic complaining of ankle pain in in left. The pain is described as Sharp, Aching, Throbbing, and Tight. The onset of the pain was gradual and has worsened over the past several months. Leydi rates the pain as 6/10. She denies a history of trauma. Prior treatments include ankle brace, oral steroid and oral muscle relaxer with minimal relief. Patient points to left medial ankle. Patient has no other pedal complaints at this time.     Patient Active Problem List   Diagnosis    Hyperlipidemia LDL goal <70    Hypertension goal BP (blood pressure) < 130/80    Obesity (BMI 30-39.9)    Controlled type 2 diabetes mellitus without complication, without long-term current use of insulin    Mild intermittent asthma without complication    Sacroiliitis       Medication List with Changes/Refills   Current Medications    ALBUTEROL-IPRATROPIUM (DUO-NEB) 2.5 MG-0.5 MG/3 ML NEBULIZER SOLUTION    Take 3 mLs by nebulization every 6 (six) hours as needed for Wheezing. Rescue    AMLODIPINE (NORVASC) 5 MG TABLET    TAKE 1 TABLET(5 MG) BY MOUTH EVERY DAY    BLOOD SUGAR DIAGNOSTIC STRP    Once daily glucose testing.    DULAGLUTIDE (TRULICITY) 4.5 MG/0.5 ML PEN INJECTOR    Inject 4.5 mg into the skin every 7 days.    DULOXETINE (CYMBALTA) 60 MG CAPSULE    TAKE 1 CAPSULE(60 MG) BY MOUTH EVERY DAY    FLUTICASONE PROPIONATE (FLONASE) 50 MCG/ACTUATION NASAL SPRAY    2 sprays (100 mcg total) by Each Nostril route once daily.    IPRATROPIUM (ATROVENT) 0.02 % NEBULIZER SOLUTION    Take 2.5 mLs (500 mcg total) by nebulization once. Rescue for 1 dose    KETOCONAZOLE (NIZORAL) 2 % SHAMPOO    Apply topically.    LANCETS (LANCETS,THIN) MISC    Once daily glucose testing.     MECLIZINE (ANTIVERT) 25 MG TABLET    Take 1 tablet (25 mg total) by mouth 3 (three) times daily as needed for Dizziness.    MELOXICAM (MOBIC) 7.5 MG TABLET    Take 1 tablet (7.5 mg total) by mouth once daily. Take with a meal    METHOCARBAMOL (ROBAXIN) 500 MG TAB    Take 1 tablet (500 mg total) by mouth nightly as needed.    METHYLPREDNISOLONE (MEDROL DOSEPACK) 4 MG TABLET    Take 1 tablet (4 mg total) by mouth once daily. Use as instructed on dose pack    MONTELUKAST (SINGULAIR) 10 MG TABLET    Take 1 tablet (10 mg total) by mouth every evening.    OXYBUTYNIN (DITROPAN) 5 MG TAB    TAKE 1 TABLET(5 MG) BY MOUTH TWICE DAILY    POLYMYXIN B SULF-TRIMETHOPRIM (POLYTRIM) 10,000 UNIT- 1 MG/ML DROP    Place 1 drop into both eyes every 6 (six) hours.    POTASSIUM CHLORIDE SA (K-DUR,KLOR-CON M) 10 MEQ TABLET    TAKE 1 TABLET BY MOUTH EVERY DAY    PREGABALIN (LYRICA) 75 MG CAPSULE    Take 1 capsule (75 mg total) by mouth 2 (two) times daily.    ROSUVASTATIN (CRESTOR) 40 MG TAB    TAKE 1 TABLET(40 MG) BY MOUTH EVERY EVENING    TIZANIDINE (ZANAFLEX) 4 MG TABLET    Take 1 tablet (4 mg total) by mouth every 6 (six) hours as needed.    TRUE METRIX AIR GLUCOSE METER MISC    USE AS DIRECTED TO TEST BLOOD SUGAR ONCE DAILY       Review of patient's allergies indicates:   Allergen Reactions    Lipitor [atorvastatin]      Other reaction(s): Muscle pain       Past Surgical History:   Procedure Laterality Date    COLONOSCOPY N/A 07/19/2022    Procedure: COLONOSCOPY;  Surgeon: Elisabeth Rodriguez MD;  Location: [facility];  Service: Endoscopy;  Laterality: N/A;    FOOT SURGERY  at 11y/o    arch lift    INJECTION OF ANESTHETIC AGENT AROUND MEDIAL BRANCH NERVES INNERVATING LUMBAR FACET JOINT Bilateral 07/28/2022    Procedure: diagnostic bilateral L3-5 MBB;  Surgeon: Nick Kruger MD;  [facility];  Service: Pain Management;  Laterality: Bilateral;    INJECTION OF ANESTHETIC AGENT INTO SACROILIAC JOINT Bilateral 06/16/2022     "Procedure: Bilateral SIJ Injection;  Surgeon: Sebastien Cameron MD;  Location: [facility];  Service: Pain Management;  Laterality: Bilateral;       Family History   Problem Relation Age of Onset    Diabetes Mother     Hypertension Mother     Hypertension Father     Hyperlipidemia Father     Breast cancer Sister 58    Colon cancer Neg Hx     Ovarian cancer Neg Hx        Social History     Socioeconomic History    Marital status:     Number of children: 1   Occupational History     Employer: [employer]   Tobacco Use    Smoking status: Never    Smokeless tobacco: Never   Substance and Sexual Activity    Alcohol use: No    Drug use: No    Sexual activity: Yes     Partners: Male     Birth control/protection: Post-menopausal       Vitals:    03/21/24 0818   Weight: 105 kg (231 lb 7.7 oz)   Height: 5' 5" (1.651 m)   PainSc:   6       Hemoglobin A1C   Date Value Ref Range Status   12/19/2023 6.0 (H) 4.0 - 5.6 % Final     Comment:     ADA Screening Guidelines:  5.7-6.4%  Consistent with prediabetes  >or=6.5%  Consistent with diabetes    High levels of fetal hemoglobin interfere with the HbA1C  assay. Heterozygous hemoglobin variants (HbS, HgC, etc)do  not significantly interfere with this assay.   However, presence of multiple variants may affect accuracy.     05/05/2023 6.3 (H) 4.0 - 5.6 % Final     Comment:     ADA Screening Guidelines:  5.7-6.4%  Consistent with prediabetes  >or=6.5%  Consistent with diabetes    High levels of fetal hemoglobin interfere with the HbA1C  assay. Heterozygous hemoglobin variants (HbS, HgC, etc)do  not significantly interfere with this assay.   However, presence of multiple variants may affect accuracy.     10/26/2022 5.9 (H) 4.0 - 5.6 % Final     Comment:     ADA Screening Guidelines:  5.7-6.4%  Consistent with prediabetes  >or=6.5%  Consistent with diabetes    High levels of fetal hemoglobin interfere with the HbA1C  assay. Heterozygous hemoglobin variants (HbS, HgC, " etc)do  not significantly interfere with this assay.   However, presence of multiple variants may affect accuracy.         Review of Systems   Constitutional:  Negative for chills and fever.   Respiratory:  Negative for shortness of breath.    Cardiovascular:  Negative for chest pain, palpitations, orthopnea, claudication and leg swelling.   Gastrointestinal:  Negative for diarrhea, nausea and vomiting.   Musculoskeletal:  Negative for joint pain.   Skin:  Negative for rash.   Neurological:  Negative for dizziness, tingling, sensory change, focal weakness and weakness.   Psychiatric/Behavioral: Negative.           Objective:      PHYSICAL EXAM: Apperance: Alert and orient in no distress,well developed, and with good attention to grooming and body habits  Patient presents ambulating in casual shoes.   Lower Extremity Exam  VASCULAR: Dorsalis pedis pulses 2/4 bilateral and Posterior Tibial pulses 2/4 bilateral.   DERMATOLOGICAL: No skin rashes, subcutaneous nodules, lesions, or ulcers observed bilateral.   NEUROLOGICAL: Light touch, sharp-dull, proprioception all present and equal bilaterally.    MUSCULOSKELETAL: Muscle strength 5/5 for all foot inverters, everters, plantarflexors, and dorsiflexors bilateral. Ankle joints bilateral shows decreased ROM. Pain on palpation of left medial ankle along the medial malleoli and posterior tibial tendon. No pain on left ankle eversion or inversion.     TEST RESULTS: MRI of left foot/ankle taken 03/15/2024 reveals High-grade partial-thickness distal posterior tibialis tendon tear with associated tenosynovitis.         Assessment:       ICD-10-CM ICD-9-CM   1. Tibialis posterior tendon tear, nontraumatic, left - Left Foot  M66.872 727.68   2. Tenosynovitis of tibialis posterior tendon - Left Foot  M65.9 727.06       Plan:   Tibialis posterior tendon tear, nontraumatic, left - Left Foot    Tenosynovitis of tibialis posterior tendon - Left Foot    I counseled the patient on her  conditions, regarding findings of my examination, my impressions, and usual treatment plan.   Reviewed left foot MRI.   Patient was fitted with short walking boot and instructed on proper usage. This should be worn daily for a minimum of 4-6 weeks at all times when ambulating. Patient instructed not to drive with walking boot if on right foot.   Patient instructed on adequate icing techniques. Patient should ice the affected area at least once per day x 10 minutes for 10 days . I advised the  patient that extra icing would also be beneficial to ensure adequate anti inflammatory effect.   Patient to return in 6 weeks or sooner if needed.        Lety Downey DPM  [facility]

## 2024-04-09 DIAGNOSIS — M21.6X1 ACQUIRED EQUINUS DEFORMITY OF BOTH FEET: ICD-10-CM

## 2024-04-09 DIAGNOSIS — M21.6X2 ACQUIRED EQUINUS DEFORMITY OF BOTH FEET: ICD-10-CM

## 2024-04-09 DIAGNOSIS — N32.81 OVERACTIVE BLADDER: ICD-10-CM

## 2024-04-09 DIAGNOSIS — M72.2 PLANTAR FASCIITIS: ICD-10-CM

## 2024-04-09 RX ORDER — METHOCARBAMOL 500 MG/1
TABLET, FILM COATED ORAL
Qty: 30 TABLET | Refills: 0 | Status: SHIPPED | OUTPATIENT
Start: 2024-04-09 | End: 2024-05-13

## 2024-04-09 RX ORDER — OXYBUTYNIN CHLORIDE 5 MG/1
5 TABLET ORAL 2 TIMES DAILY
Qty: 180 TABLET | Refills: 2 | Status: SHIPPED | OUTPATIENT
Start: 2024-04-09

## 2024-04-09 NOTE — TELEPHONE ENCOUNTER
Refill Decision Note   Leydi Quintero  is requesting a refill authorization.  Brief Assessment and Rationale for Refill:  Approve     Medication Therapy Plan:         Comments:     Note composed:3:48 PM 04/09/2024

## 2024-04-09 NOTE — TELEPHONE ENCOUNTER
No care due was identified.  [facility] Embedded Care Due Messages. Reference number: [id number].   4/09/2024 5:15:25 AM CDT

## 2024-05-09 DIAGNOSIS — M21.6X2 ACQUIRED EQUINUS DEFORMITY OF BOTH FEET: ICD-10-CM

## 2024-05-09 DIAGNOSIS — M72.2 PLANTAR FASCIITIS: ICD-10-CM

## 2024-05-09 DIAGNOSIS — M21.6X1 ACQUIRED EQUINUS DEFORMITY OF BOTH FEET: ICD-10-CM

## 2024-05-13 RX ORDER — METHOCARBAMOL 500 MG/1
TABLET, FILM COATED ORAL
Qty: 30 TABLET | Refills: 0 | Status: SHIPPED | OUTPATIENT
Start: 2024-05-13

## 2024-06-26 ENCOUNTER — LAB VISIT (OUTPATIENT)
Dept: LAB | Facility: HOSPITAL | Age: 59
End: 2024-06-26
Attending: PHYSICIAN ASSISTANT
Payer: COMMERCIAL

## 2024-06-26 ENCOUNTER — OFFICE VISIT (OUTPATIENT)
Dept: INTERNAL MEDICINE | Facility: CLINIC | Age: 59
End: 2024-06-26
Payer: COMMERCIAL

## 2024-06-26 ENCOUNTER — PATIENT MESSAGE (OUTPATIENT)
Dept: INTERNAL MEDICINE | Facility: CLINIC | Age: 59
End: 2024-06-26

## 2024-06-26 VITALS
HEART RATE: 85 BPM | OXYGEN SATURATION: 97 % | TEMPERATURE: 97 F | WEIGHT: 227.94 LBS | SYSTOLIC BLOOD PRESSURE: 132 MMHG | DIASTOLIC BLOOD PRESSURE: 84 MMHG | RESPIRATION RATE: 21 BRPM | BODY MASS INDEX: 37.93 KG/M2

## 2024-06-26 DIAGNOSIS — E78.5 HYPERLIPIDEMIA LDL GOAL <70: Chronic | ICD-10-CM

## 2024-06-26 DIAGNOSIS — I10 HYPERTENSION GOAL BP (BLOOD PRESSURE) < 130/80: Chronic | ICD-10-CM

## 2024-06-26 DIAGNOSIS — M54.42 CHRONIC BILATERAL LOW BACK PAIN WITH BILATERAL SCIATICA: ICD-10-CM

## 2024-06-26 DIAGNOSIS — E78.5 HYPERLIPIDEMIA LDL GOAL <70: Primary | Chronic | ICD-10-CM

## 2024-06-26 DIAGNOSIS — G89.29 CHRONIC BILATERAL LOW BACK PAIN WITH BILATERAL SCIATICA: ICD-10-CM

## 2024-06-26 DIAGNOSIS — M54.41 CHRONIC BILATERAL LOW BACK PAIN WITH BILATERAL SCIATICA: ICD-10-CM

## 2024-06-26 DIAGNOSIS — E11.9 CONTROLLED TYPE 2 DIABETES MELLITUS WITHOUT COMPLICATION, WITHOUT LONG-TERM CURRENT USE OF INSULIN: ICD-10-CM

## 2024-06-26 LAB
ALBUMIN SERPL BCP-MCNC: 3.7 G/DL (ref 3.5–5.2)
ALP SERPL-CCNC: 75 U/L (ref 55–135)
ALT SERPL W/O P-5'-P-CCNC: 19 U/L (ref 10–44)
ANION GAP SERPL CALC-SCNC: 8 MMOL/L (ref 8–16)
AST SERPL-CCNC: 17 U/L (ref 10–40)
BASOPHILS # BLD AUTO: 0.03 K/UL (ref 0–0.2)
BASOPHILS NFR BLD: 0.5 % (ref 0–1.9)
BILIRUB SERPL-MCNC: 0.5 MG/DL (ref 0.1–1)
BUN SERPL-MCNC: 9 MG/DL (ref 6–20)
CALCIUM SERPL-MCNC: 9.7 MG/DL (ref 8.7–10.5)
CHLORIDE SERPL-SCNC: 111 MMOL/L (ref 95–110)
CHOLEST SERPL-MCNC: 164 MG/DL (ref 120–199)
CHOLEST/HDLC SERPL: 3.8 {RATIO} (ref 2–5)
CO2 SERPL-SCNC: 24 MMOL/L (ref 23–29)
CREAT SERPL-MCNC: 1 MG/DL (ref 0.5–1.4)
DIFFERENTIAL METHOD BLD: ABNORMAL
EOSINOPHIL # BLD AUTO: 0.2 K/UL (ref 0–0.5)
EOSINOPHIL NFR BLD: 3.1 % (ref 0–8)
ERYTHROCYTE [DISTWIDTH] IN BLOOD BY AUTOMATED COUNT: 14.2 % (ref 11.5–14.5)
EST. GFR  (NO RACE VARIABLE): >60 ML/MIN/1.73 M^2
ESTIMATED AVG GLUCOSE: 120 MG/DL (ref 68–131)
GLUCOSE SERPL-MCNC: 96 MG/DL (ref 70–110)
HBA1C MFR BLD: 5.8 % (ref 4–5.6)
HCT VFR BLD AUTO: 39.3 % (ref 37–48.5)
HDLC SERPL-MCNC: 43 MG/DL (ref 40–75)
HDLC SERPL: 26.2 % (ref 20–50)
HGB BLD-MCNC: 12.5 G/DL (ref 12–16)
IMM GRANULOCYTES # BLD AUTO: 0.02 K/UL (ref 0–0.04)
IMM GRANULOCYTES NFR BLD AUTO: 0.3 % (ref 0–0.5)
LDLC SERPL CALC-MCNC: 89.6 MG/DL (ref 63–159)
LYMPHOCYTES # BLD AUTO: 1.8 K/UL (ref 1–4.8)
LYMPHOCYTES NFR BLD: 29.8 % (ref 18–48)
MCH RBC QN AUTO: 27.2 PG (ref 27–31)
MCHC RBC AUTO-ENTMCNC: 31.8 G/DL (ref 32–36)
MCV RBC AUTO: 86 FL (ref 82–98)
MONOCYTES # BLD AUTO: 0.3 K/UL (ref 0.3–1)
MONOCYTES NFR BLD: 5.4 % (ref 4–15)
NEUTROPHILS # BLD AUTO: 3.6 K/UL (ref 1.8–7.7)
NEUTROPHILS NFR BLD: 60.9 % (ref 38–73)
NONHDLC SERPL-MCNC: 121 MG/DL
NRBC BLD-RTO: 0 /100 WBC
PLATELET # BLD AUTO: 234 K/UL (ref 150–450)
PMV BLD AUTO: 12.5 FL (ref 9.2–12.9)
POTASSIUM SERPL-SCNC: 4.2 MMOL/L (ref 3.5–5.1)
PROT SERPL-MCNC: 7.1 G/DL (ref 6–8.4)
RBC # BLD AUTO: 4.59 M/UL (ref 4–5.4)
SODIUM SERPL-SCNC: 143 MMOL/L (ref 136–145)
TRIGL SERPL-MCNC: 157 MG/DL (ref 30–150)
WBC # BLD AUTO: 5.9 K/UL (ref 3.9–12.7)

## 2024-06-26 PROCEDURE — 99999 PR PBB SHADOW E&M-EST. PATIENT-LVL IV: CPT | Mod: PBBFAC,,, | Performed by: PHYSICIAN ASSISTANT

## 2024-06-26 PROCEDURE — 85025 COMPLETE CBC W/AUTO DIFF WBC: CPT | Performed by: PHYSICIAN ASSISTANT

## 2024-06-26 PROCEDURE — 80053 COMPREHEN METABOLIC PANEL: CPT | Performed by: PHYSICIAN ASSISTANT

## 2024-06-26 PROCEDURE — 80061 LIPID PANEL: CPT | Performed by: PHYSICIAN ASSISTANT

## 2024-06-26 PROCEDURE — 83036 HEMOGLOBIN GLYCOSYLATED A1C: CPT | Performed by: PHYSICIAN ASSISTANT

## 2024-06-26 PROCEDURE — 3079F DIAST BP 80-89 MM HG: CPT | Mod: CPTII,S$GLB,, | Performed by: PHYSICIAN ASSISTANT

## 2024-06-26 PROCEDURE — 3075F SYST BP GE 130 - 139MM HG: CPT | Mod: CPTII,S$GLB,, | Performed by: PHYSICIAN ASSISTANT

## 2024-06-26 PROCEDURE — 3008F BODY MASS INDEX DOCD: CPT | Mod: CPTII,S$GLB,, | Performed by: PHYSICIAN ASSISTANT

## 2024-06-26 PROCEDURE — G2211 COMPLEX E/M VISIT ADD ON: HCPCS | Mod: S$GLB,,, | Performed by: PHYSICIAN ASSISTANT

## 2024-06-26 PROCEDURE — 36415 COLL VENOUS BLD VENIPUNCTURE: CPT | Performed by: PHYSICIAN ASSISTANT

## 2024-06-26 PROCEDURE — 1160F RVW MEDS BY RX/DR IN RCRD: CPT | Mod: CPTII,S$GLB,, | Performed by: PHYSICIAN ASSISTANT

## 2024-06-26 PROCEDURE — 99214 OFFICE O/P EST MOD 30 MIN: CPT | Mod: S$GLB,,, | Performed by: PHYSICIAN ASSISTANT

## 2024-06-26 PROCEDURE — 1159F MED LIST DOCD IN RCRD: CPT | Mod: CPTII,S$GLB,, | Performed by: PHYSICIAN ASSISTANT

## 2024-06-26 RX ORDER — TIRZEPATIDE 7.5 MG/.5ML
7.5 INJECTION, SOLUTION SUBCUTANEOUS WEEKLY
COMMUNITY
Start: 2024-03-13 | End: 2024-06-26

## 2024-06-26 RX ORDER — CYCLOBENZAPRINE HCL 5 MG
5 TABLET ORAL NIGHTLY
COMMUNITY
Start: 2024-05-13 | End: 2024-06-26

## 2024-06-26 RX ORDER — DICLOFENAC SODIUM 10 MG/G
2 GEL TOPICAL 4 TIMES DAILY
Qty: 50 G | Refills: 0 | Status: SHIPPED | OUTPATIENT
Start: 2024-06-26

## 2024-06-26 RX ORDER — SEMAGLUTIDE 1.34 MG/ML
1 INJECTION, SOLUTION SUBCUTANEOUS
Qty: 9 ML | Refills: 1 | Status: SHIPPED | OUTPATIENT
Start: 2024-06-26 | End: 2024-06-26 | Stop reason: SDUPTHER

## 2024-06-26 RX ORDER — TIZANIDINE 4 MG/1
4 TABLET ORAL EVERY 6 HOURS PRN
Qty: 30 TABLET | Refills: 0 | Status: SHIPPED | OUTPATIENT
Start: 2024-06-26

## 2024-06-26 NOTE — TELEPHONE ENCOUNTER
Pharmacy sent a clarification request via fax. Would like to know if pt was on any dose of Ozempic previously due to Rx being for 1mg per dose (4mg/3ml)    Pharmacy states the starting dose for Ozempic should be .25mg.     Please advise as I only see Mounjaro in chart previously.

## 2024-06-26 NOTE — PROGRESS NOTES
Subjective:      Patient ID: Leydi Quintero is a 58 y.o. female.    Chief Complaint: Follow-up (She is here for a 6 month follow up. States she has been having foot pain as well as issues with sciatic pain at night. )    Patient is known to me, being seen today for 6mth f/u.      HTN- amlodipine 5mg, controlled at home   HLD- on statin therapy   DM- A1c 6%, mounjaro 7.5mg   Paying out of pocket for compound d/t not being available at pharmacies   Sugar at home 130s     Due for labs     Back/sciatica and chronic feet pain (bilateral)   Pain in lower back, radiates to bilateral legs, denies numbness/tingling  PMH sacroiliitis (Pain Med July 2022), previously received injections   Previously on lyrica, zanaflex, takes Tylenol prn     [facility] March 2024      Last visit Dec 2023.       Review of Systems   Constitutional:  Negative for chills, diaphoresis and fever.   HENT:  Negative for congestion, rhinorrhea and sore throat.    Respiratory:  Negative for cough, shortness of breath and wheezing.    Gastrointestinal:  Negative for abdominal pain, constipation, diarrhea, nausea and vomiting.   Musculoskeletal:  Positive for arthralgias, back pain and joint swelling.   Skin:  Negative for rash.   Neurological:  Negative for dizziness, light-headedness and headaches.       Objective:   /84 (BP Location: Left arm, Patient Position: Sitting, BP Method: Large (Manual))   Pulse 85   Temp 97.4 °F (36.3 °C) (Tympanic)   Resp (!) 21   Wt 103.4 kg (227 lb 15.3 oz)   LMP 11/23/2013   SpO2 97%   BMI 37.93 kg/m²   Physical Exam  Constitutional:       General: She is not in acute distress.     Appearance: Normal appearance. She is well-developed. She is not ill-appearing.   HENT:      Head: Normocephalic and atraumatic.   Cardiovascular:      Rate and Rhythm: Normal rate and regular rhythm.      Pulses:           Dorsalis pedis pulses are 2+ on the right side and 2+ on the left side.      Heart sounds: Normal heart  sounds. No murmur heard.  Pulmonary:      Effort: Pulmonary effort is normal. No respiratory distress.      Breath sounds: Normal breath sounds. No decreased breath sounds.   Musculoskeletal:      Right lower leg: No edema.      Left lower leg: No edema.   Feet:      Right foot:      Protective Sensation: 5 sites tested.  5 sites sensed.      Skin integrity: Skin integrity normal.      Toenail Condition: Right toenails are normal.      Left foot:      Protective Sensation: 5 sites tested.  5 sites sensed.      Skin integrity: Skin integrity normal.      Toenail Condition: Left toenails are normal.   Skin:     General: Skin is warm and dry.      Findings: No rash.   Psychiatric:         Speech: Speech normal.         Behavior: Behavior normal.         Thought Content: Thought content normal.       Assessment:      1. Hyperlipidemia LDL goal <70    2. Controlled type 2 diabetes mellitus without complication, without long-term current use of insulin    3. Hypertension goal BP (blood pressure) < 130/80    4. Chronic bilateral low back pain with bilateral sciatica       Plan:   Hyperlipidemia LDL goal <70  -     Lipid Panel; Future; Expected date: 06/26/2024    Controlled type 2 diabetes mellitus without complication, without long-term current use of insulin  -     Microalbumin/creatinine urine ratio; Future; Expected date: 06/26/2024  -     Hemoglobin A1C; Future; Expected date: 06/26/2024  -     semaglutide (OZEMPIC) 1 mg/dose (4 mg/3 mL); Inject 1 mg into the skin every 7 days.  Dispense: 9 mL; Refill: 1    Hypertension goal BP (blood pressure) < 130/80  -     CBC Auto Differential; Future; Expected date: 06/26/2024  -     Comprehensive Metabolic Panel; Future; Expected date: 06/26/2024    Chronic bilateral low back pain with bilateral sciatica  -     tiZANidine (ZANAFLEX) 4 MG tablet; Take 1 tablet (4 mg total) by mouth every 6 (six) hours as needed.  Dispense: 30 tablet; Refill: 0  -     diclofenac sodium (VOLTAREN)  1 % Gel; Apply 2 g topically 4 (four) times daily.  Dispense: 50 g; Refill: 0      Switch to ozempic 1mg as more readily available, consider dose increase pending A1c     Discussed RICE, topical NSAIDs, Tylenol prn   Caution muscle relaxer may cause drowsiness   F/u Pain Med and Pod appt to be scheduled     Fasting labs     6mth f/u PCP

## 2024-06-27 RX ORDER — SEMAGLUTIDE 1.34 MG/ML
1 INJECTION, SOLUTION SUBCUTANEOUS
Qty: 9 ML | Refills: 1 | Status: SHIPPED | OUTPATIENT
Start: 2024-06-27

## 2024-07-01 ENCOUNTER — TELEPHONE (OUTPATIENT)
Dept: PAIN MEDICINE | Facility: CLINIC | Age: 59
End: 2024-07-01
Payer: COMMERCIAL

## 2024-08-06 DIAGNOSIS — E11.9 CONTROLLED TYPE 2 DIABETES MELLITUS WITHOUT COMPLICATION, WITHOUT LONG-TERM CURRENT USE OF INSULIN: ICD-10-CM

## 2024-08-06 RX ORDER — SEMAGLUTIDE 1.34 MG/ML
1 INJECTION, SOLUTION SUBCUTANEOUS
Qty: 9 ML | Refills: 1 | Status: CANCELLED | OUTPATIENT
Start: 2024-08-06

## 2024-08-07 ENCOUNTER — TELEPHONE (OUTPATIENT)
Dept: PHARMACY | Facility: CLINIC | Age: 59
End: 2024-08-07
Payer: COMMERCIAL

## 2024-08-15 ENCOUNTER — TELEPHONE (OUTPATIENT)
Dept: PHARMACY | Facility: CLINIC | Age: 59
End: 2024-08-15
Payer: COMMERCIAL

## 2024-08-15 NOTE — TELEPHONE ENCOUNTER
[facility]/Population Health Chart Review & Patient Outreach Details For Medication Adherence Project    Reason for Outreach Encounter: 3rd Party payor non-compliance report ([employer], [employer], C, etc)    2.  Patient Outreach Method: N/A, reviewed patient chart     3.   Medication in question:   Hyperlipidemia Medications               rosuvastatin (CRESTOR) 40 MG Tab TAKE 1 TABLET(40 MG) BY MOUTH EVERY EVENING                LAST FILLED: 7/12/24 for 90 day supply    4.  Reviewed and or Updates Made To: Patient Chart    5. Outreach Outcomes and/or actions taken: Patient filled medication and is on track to be adherent    Additional Notes:

## 2024-09-06 ENCOUNTER — TELEPHONE (OUTPATIENT)
Dept: OPHTHALMOLOGY | Facility: CLINIC | Age: 59
End: 2024-09-06
Payer: COMMERCIAL

## 2024-10-21 ENCOUNTER — TELEPHONE (OUTPATIENT)
Dept: PAIN MEDICINE | Facility: CLINIC | Age: 59
End: 2024-10-21
Payer: COMMERCIAL

## 2024-10-21 NOTE — TELEPHONE ENCOUNTER
I called the patient and made her a virtual appointment for tomorrow. Patient is having so back pain again and wants to see if she can get injections.

## 2024-10-22 ENCOUNTER — OFFICE VISIT (OUTPATIENT)
Dept: PAIN MEDICINE | Facility: CLINIC | Age: 59
End: 2024-10-22
Payer: COMMERCIAL

## 2024-10-22 VITALS
SYSTOLIC BLOOD PRESSURE: 125 MMHG | HEIGHT: 65 IN | RESPIRATION RATE: 17 BRPM | DIASTOLIC BLOOD PRESSURE: 79 MMHG | HEART RATE: 82 BPM | BODY MASS INDEX: 34.25 KG/M2 | WEIGHT: 205.56 LBS

## 2024-10-22 DIAGNOSIS — M47.816 LUMBAR SPONDYLOSIS: Primary | ICD-10-CM

## 2024-10-22 DIAGNOSIS — M51.369 BULGING LUMBAR DISC: ICD-10-CM

## 2024-10-22 DIAGNOSIS — M47.816 FACET HYPERTROPHY OF LUMBAR REGION: ICD-10-CM

## 2024-10-22 DIAGNOSIS — M54.16 LUMBAR RADICULOPATHY: ICD-10-CM

## 2024-10-22 PROCEDURE — 1159F MED LIST DOCD IN RCRD: CPT | Mod: CPTII,S$GLB,, | Performed by: NURSE PRACTITIONER

## 2024-10-22 PROCEDURE — 3066F NEPHROPATHY DOC TX: CPT | Mod: CPTII,S$GLB,, | Performed by: NURSE PRACTITIONER

## 2024-10-22 PROCEDURE — 3008F BODY MASS INDEX DOCD: CPT | Mod: CPTII,S$GLB,, | Performed by: NURSE PRACTITIONER

## 2024-10-22 PROCEDURE — 99999 PR PBB SHADOW E&M-EST. PATIENT-LVL III: CPT | Mod: PBBFAC,,, | Performed by: NURSE PRACTITIONER

## 2024-10-22 PROCEDURE — 3078F DIAST BP <80 MM HG: CPT | Mod: CPTII,S$GLB,, | Performed by: NURSE PRACTITIONER

## 2024-10-22 PROCEDURE — 3044F HG A1C LEVEL LT 7.0%: CPT | Mod: CPTII,S$GLB,, | Performed by: NURSE PRACTITIONER

## 2024-10-22 PROCEDURE — 3074F SYST BP LT 130 MM HG: CPT | Mod: CPTII,S$GLB,, | Performed by: NURSE PRACTITIONER

## 2024-10-22 PROCEDURE — 99214 OFFICE O/P EST MOD 30 MIN: CPT | Mod: S$GLB,,, | Performed by: NURSE PRACTITIONER

## 2024-10-22 PROCEDURE — 3061F NEG MICROALBUMINURIA REV: CPT | Mod: CPTII,S$GLB,, | Performed by: NURSE PRACTITIONER

## 2024-10-22 NOTE — PROGRESS NOTES
Established Patient       Chief Pain Complaint:  Low back pain     Interval History (10/22/2024):  Patient Leydi Quintero presents today for follow-up visit.  Patient is here for follow up of low back pain. She had a bilateral L4/5 + L5/S1 MBB on 7/28/2022 with 90% relief which provided additional relief for a year. Pain has returned in the same location. Pain is 6/10 and primarily axial in the low back. Pain is worse with prolonged standing and extending back. At times she has referred pain in the sides of the legs to the feet. She takes tylenol and zanaflex as needed.  Patient denies night fever/night sweats, urinary incontinence, bowel incontinence, significant weight loss and significant motor weakness.   Patient denies any other complaints or concerns at this time.      Interval History (7/20/2022): Leydi Quintero presents today for follow-up on telemedicine visit.  she underwent bilateral SIJ injection on 6/16/22.  The patient reports that she is/was better following the procedure. Reports 90% pain relief on left, 50% pain relief on right.  The changes lasted 4 weeks so far.  The changes have continued through this visit.   Lyrica was started at previous visit, but she ran out so is no longer taking. She continues taking Zanaflex 4mg PRN.    Interval History (4/29/2022):  Leydi Quintero presents today for follow-up visit.  Patient was last seen almost 2 years ago. At that visit, the plan was to get EMG of the legs to evaluate numbness in the feet, which she canceled due to COVID at the time.  She feels the pain worsens with activity.  Pain is intermittent, comes and goes and is never constant.  She has done physical therapy in the past with relief, but the pain returns shortly after she completes the sessions.  She could not tolerate gabapentin in the past.  Lyrica was not approved by insurance.  She was recently given Zanaflex from her primary care, which she took last night, and she  got a good night's rest for the 1st time in a while.  She was also given Voltaren gel, which was not yet approved by insurance.    History of Present Illness (6/18/20):   Ms. Quintero returns to clinic for follow-up.  She has obtained her lumbar MRI which we will review today.  She continues have pain located primarily low back in the posterior lower legs.  She rates her pain currently as a 4/10.  She continues to have pain located in the axial lumbar spine in addition to the bilateral lower extremities.  MRI shows minor facet arthropathy L3/4-L4/5 in addition to minimal left L4/5 neural foraminal stenosis.  She has started on gabapentin however this was causing excessive drowsiness in addition to an increase in appetite therefore she has been switched to Lyrica 50 mg capsules twice daily which she has found to be much more helpful.  She continues to have pain in her lower legs in a nondermatomal distribution but reports that radiates from her low back distally into her feet.  She reports the right leg is worse than the left leg currently.  She completed physical therapy of greater than 6 weeks in February of 2020 and continues to perform therapy exercises at home as tolerated.    Initial HPI (6/4/20):  This patient is a 54 y.o. female who presents today complaining of the above noted pain/s. The patient describes the pain as follows.  Ms. Quintero is new patient clinic with complaints of low back and leg pain for approximately one year.  There was no inciting event and currently rates her pain as 8/10.  She describes mostly a sharp, aching, with numbness and tingling in her legs bilaterally.  Her symptoms worse with walking and standing for long periods of time her somewhat improved with rest and propping her feet up.  She has not really tried anything with medications however she has been physical therapy which he completed 4 months ago greater than 6 weeks physical therapy with some benefit.  She continues to walk  for exercise at home in addition to performing physical therapy exercises.  She has tried Tylenol over-the-counter with minimal symptomatic pain relief.  She finds that her right leg seems to be worse in the left leg and she endorses having numbness, weakness, tingling in bilateral lower extremities.  She does wear compression socks which did provide some benefit however these of loss her effectiveness.  She denies having had surgery injections in her lumbar spine.  She finds that heat and massage do provide some benefit.    Previous Therapy:  Medications:  Tylenol, gabapentin, Lyrica  Injections:   - bilateral SIJ injection on 6/16/22 with 90% pain relief on left, 50% on right  bilateral L4/5 + L5/S1 MBB on 7/28/2022 with 90% relief    Surgeries: No spinal surgery   Physical Therapy: Completed in the Past, completed greater than 6 weeks of physical therapy approximately 4 months ago       Imaging / Labs / Studies (reviewed on 10/22/2024):    06/16/2020 MRI Lumbar Spine Without Contrast  COMPARISON: None    FINDINGS:  The distal cord and conus reveal normal signal and morphology. The lumbar vertebra reveal normal alignment, shape and signal intensity.    T12-L1: Unremarkable.    L1-2:  Unremarkable.  Incidental demonstration of a 3 cm right renal cysts.    L2-3:  Unremarkable.    L3-4:  Minor facet hypertrophy.    L4-5:  Mild disc desiccation with annular bulge.  Left foraminal annular fissure.  Minor left foraminal stenosis.  Minor facet hypertrophy.    L5-S1:  Unremarkable.    Impression  Mild L4-5 degenerative disc disease with annular bulge and left foraminal annular fissure.  Minor left foraminal stenosis.  Minor multilevel facet arthrosis.        Review of Systems:  Constitutional: Negative for fever.   Eyes: Negative for blurred vision.   Respiratory: Negative for cough and wheezing.    Cardiovascular: Negative for chest pain and orthopnea.   Gastrointestinal: Negative for constipation, diarrhea, nausea and  "vomiting.   Genitourinary: Negative for dysuria.   Musculoskeletal: Positive for back pain.        Lumbar radiculopathy   Skin: Negative for itching and rash.   Neurological: Positive for tingling and weakness.   Endo/Heme/Allergies: Does not bruise/bleed easily.       Physical Exam:    Vitals:    10/22/24 0831   BP: 125/79   Pulse: 82   Resp: 17   Weight: 93.2 kg (205 lb 9.3 oz)   Height: 5' 5" (1.651 m)       Body mass index is 34.21 kg/m².   (reviewed on 10/22/2024)     General: alert and oriented, in no apparent distress  Gait: normal gait.  Skin: no rashes, no discoloration, no obvious lesions  HEENT: normocephalic, atraumatic. Pupils equal and round.  Cardiovascular: no significant peripheral edema present.  Respiratory: without use of accessory muscles of respiration.    Musculoskeletal - Lumbar Spine:  - Spinal Sensation: Normal   - Pain on flexion of lumbar spine: Absent  - Pain on extension of lumbar spine: Present  - Lumbar facet loading: Present bilaterally  - TTP over the lumbar facet joints: Present   - TTP over the lumbar paraspinals: Present   - TTP over the SI joints: Absent  - TTP over GT bursa: Absent  - Straight Leg Raise: Equivocal       Neuro - Lower Extremities:  - RLE Strength:     >> 5/5 strength with right hip flexion/ extension    >> 5/5 strength with right knee flexion/ extension    >> 5/5 strength in right ankle with dorsiflexion    >> 5/5 strength in right ankle with plantar flexion  - LLE Strength:     >> 5/5 strength with left hip flexion/ extension    >> 5/5 strength with knee flexion extension on the left     >> 5/5 strength in left ankle with dorsiflexion    >> 5/5 strength in left ankle with plantar flexion  - BLE Strength: R/L: HF: 5/5, HE: 5/5, KF: 5/5; KE: 5/5; FE: 5/5; FF: 5/5  - Extremity Reflexes: Brisk and symmetric throughout  - Sensory: Sensation to light touch intact bilaterally      Psych:  Mood and affect is appropriate      Assessment  1. 58 y.o. year old patient with " PMH of   Past Medical History:   Diagnosis Date    Allergic rhinitis     Anxiety     Asthma     Diabetes mellitus, type 2     Hyperlipidemia     Hypertension     Personal history of colonic polyps 07/24/2014    Sarcoidosis       presenting with pain located lumbar spine:    ICD-10-CM ICD-9-CM    1. Lumbar spondylosis  M47.816 721.3 Case Request-RAD/Other Procedure Area: Bilateral L4-5 and L5-S1 MBB      2. Facet hypertrophy of lumbar region  M47.816 721.3       3. Bulging lumbar disc  M51.369 722.52       4. Lumbar radiculopathy  M54.16 724.4                 PLAN:  1. Interventional: Schedule bilateral L4/5 + L5/S1 MBB #1. We will call the day after the procedure. If patient reports at least 80% relief of pain, we will move forward with MBB #2. If patient again reports at least 80% relief of pain, we will then move forward with the RFA.   Explained the risks and benefits of the procedure in detail with the patient today in clinic along with alternative treatment options, and the patient elected to pursue the intervention.        S/p bilateral L4/5 + L5/S1 MBB on 7/28/2022 with 90% relief  - S/p bilateral SIJ injection on 6/16/22 with 90% pain relief on left, 50% on right    - If leg pain continues, consider bilateral L4/5 TF MYNOR      2. Pharmacologic:   - Pt Dced lyrica- drowsiness    - Continue Zanaflex 4 mg QHS PRN. We have discussed potential deleterious side effects associated with this medication including  dizziness, drowsiness, dry mouth or tingling sensation in the upper or lower extremities.     - Anticoagulation use: None.     3. Rehabilitative: Encouraged regular exercise. She previously completed greater than 6 weeks physical therapy in the past with minimal symptomatic relief; she continues to walk for exercise and performs physical therapy exercises at home on a daily basis.     4. Diagnostic: BLE EMG/NCS previously not completed. Consider reordering.  Will reorder to evaluate foot numbness, which does  not appear to be from lumbar radicular sources.  Has a history of diabetes.  Lumbar MRI reviewed today    5. Follow up: Will call for % relief to determine RFA eligibility     - This condition does not require this patient to take time off of work, and the primary goal of our Pain Management services is to improve the patient's functional capacity.   - I discussed the risks, benefits, and alternatives to potential treatment options. All questions and concerns were fully addressed today in clinic.       Romy Mota NP     Disclaimer:  This note was prepared using voice recognition system and is likely to have sound alike errors that may have been overlooked even after proof reading.  Please call me with any questions.

## 2024-10-29 NOTE — PRE-PROCEDURE INSTRUCTIONS
Spoke with patient regarding procedure scheduled on 11.6     Arrival time 0700     Has patient been sick with fever or on antibiotics within the last 7 days? No     Does the patient have any open wounds, sores or rashes? No     Does the patient have any recent fractures? no     Has patient received a vaccination within the last 7 days? No     Received the COVID vaccination? yes     Has the patient stopped all medications as directed? na     Does patient have a pacemaker, defibrillator, or implantable stimulator? No     Does the patient have a ride to and from procedure and someone reliable to remain with patient?  DAUGHTER     Is the patient diabetic? YES     Does the patient have sleep apnea? Or use O2 at home? no     Is the patient receiving sedation? yes     Is the patient instructed to remain NPO beginning at midnight the night before their procedure? yes     Procedure location confirmed with patient? Yes     Covid- Denies signs/symptoms. Instructed to notify PAT/MD if any changes.

## 2024-11-04 ENCOUNTER — TELEPHONE (OUTPATIENT)
Dept: PAIN MEDICINE | Facility: CLINIC | Age: 59
End: 2024-11-04
Payer: COMMERCIAL

## 2024-11-04 NOTE — PRE-PROCEDURE INSTRUCTIONS
Spoke with patient regarding procedure scheduled on 11.18     Arrival time 0715     Has patient been sick with fever or on antibiotics within the last 7 days? No     Does the patient have any open wounds, sores or rashes? No     Does the patient have any recent fractures? no     Has patient received a vaccination within the last 7 days? No     Received the COVID vaccination? yes     Has the patient stopped all medications as directed? na     Does patient have a pacemaker, defibrillator, or implantable stimulator? No     Does the patient have a ride to and from procedure and someone reliable to remain with patient?  DAUGHTER     Is the patient diabetic? YES     Does the patient have sleep apnea? Or use O2 at home? no     Is the patient receiving sedation? yes     Is the patient instructed to remain NPO beginning at midnight the night before their procedure? yes     Procedure location confirmed with patient? Yes     Covid- Denies signs/symptoms. Instructed to notify PAT/MD if any changes.

## 2024-11-04 NOTE — TELEPHONE ENCOUNTER
----- Message from Adenike sent at 11/4/2024  2:52 PM CST -----  Contact: Leydi  Mrs. Holley needs a call back at [phone] in regards to rescheduling the procedure she has on the 6th. She states that she can take a appointment for the next following week if possible    Thanks  MW

## 2024-11-18 ENCOUNTER — HOSPITAL ENCOUNTER (OUTPATIENT)
Facility: HOSPITAL | Age: 59
Discharge: HOME | End: 2024-11-18
Attending: ANESTHESIOLOGY | Admitting: ANESTHESIOLOGY
Payer: COMMERCIAL

## 2024-11-18 VITALS
OXYGEN SATURATION: 94 % | SYSTOLIC BLOOD PRESSURE: 174 MMHG | DIASTOLIC BLOOD PRESSURE: 95 MMHG | HEART RATE: 72 BPM | HEIGHT: 65 IN | TEMPERATURE: 97 F | RESPIRATION RATE: 16 BRPM | BODY MASS INDEX: 37.34 KG/M2 | WEIGHT: 224.13 LBS

## 2024-11-18 DIAGNOSIS — M47.816 LUMBAR SPONDYLOSIS: Primary | ICD-10-CM

## 2024-11-18 LAB — POCT GLUCOSE: 123 MG/DL (ref 70–110)

## 2024-11-18 PROCEDURE — 82962 GLUCOSE BLOOD TEST: CPT | Performed by: ANESTHESIOLOGY

## 2024-11-18 PROCEDURE — 64494 INJ PARAVERT F JNT L/S 2 LEV: CPT | Mod: 50,,, | Performed by: ANESTHESIOLOGY

## 2024-11-18 PROCEDURE — 64493 INJ PARAVERT F JNT L/S 1 LEV: CPT | Mod: 50,,, | Performed by: ANESTHESIOLOGY

## 2024-11-18 PROCEDURE — 64494 INJ PARAVERT F JNT L/S 2 LEV: CPT | Mod: 50 | Performed by: ANESTHESIOLOGY

## 2024-11-18 PROCEDURE — 63600175 PHARM REV CODE 636 W HCPCS: Mod: JZ,JG | Performed by: ANESTHESIOLOGY

## 2024-11-18 PROCEDURE — 64493 INJ PARAVERT F JNT L/S 1 LEV: CPT | Mod: 50 | Performed by: ANESTHESIOLOGY

## 2024-11-18 RX ORDER — ONDANSETRON HYDROCHLORIDE 2 MG/ML
4 INJECTION, SOLUTION INTRAVENOUS ONCE AS NEEDED
Status: DISCONTINUED | OUTPATIENT
Start: 2024-11-18 | End: 2024-11-18 | Stop reason: HOSPADM

## 2024-11-18 RX ORDER — FENTANYL CITRATE 50 UG/ML
INJECTION, SOLUTION INTRAMUSCULAR; INTRAVENOUS
Status: DISCONTINUED | OUTPATIENT
Start: 2024-11-18 | End: 2024-11-18 | Stop reason: HOSPADM

## 2024-11-18 RX ORDER — MIDAZOLAM HYDROCHLORIDE 1 MG/ML
INJECTION, SOLUTION INTRAMUSCULAR; INTRAVENOUS
Status: DISCONTINUED | OUTPATIENT
Start: 2024-11-18 | End: 2024-11-18 | Stop reason: HOSPADM

## 2024-11-18 RX ORDER — BUPIVACAINE HYDROCHLORIDE 5 MG/ML
INJECTION, SOLUTION EPIDURAL; INTRACAUDAL
Status: DISCONTINUED | OUTPATIENT
Start: 2024-11-18 | End: 2024-11-18 | Stop reason: HOSPADM

## 2024-11-18 NOTE — DISCHARGE INSTRUCTIONS

## 2024-11-18 NOTE — PLAN OF CARE
Pt arouses to voice. Pt. O2 94 on 3L. Attempted to put pt on room air and O2 dropped to 87%. Pt placed back on 3 L NC.

## 2024-11-18 NOTE — OP NOTE
LUMBAR Medial Branch Block Under Fluoroscopy,  Bilateral L4/5 and L5/S1    INFORMED CONSENT: The procedure, risks, benefits and options were discussed with patient. There are no contraindications to the procedure. The patient expressed understanding and agreed to proceed. The personnel performing the procedure was discussed.    Date of procedure 11/18/2024    Time-out taken to identify patient and procedure side prior to starting the procedure.                                                                PROCEDURE:  Bilateral medial branch block at the transverse processes at the level of L4, L5, and the sacral ala    Pre Procedure diagnosis:    Lumbar spondylosis [M47.816]  1. Lumbar spondylosis        Post-Procedure diagnosis:   same    PHYSICIAN: Nick Kruger MD  ASSISTANTS: None    MEDICATIONS INJECTED: 0.5% bupivicane, 1mL at each level    LOCAL ANESTHETIC USED: Lidocaine, 1%, 1ml at each level    ESTIMATED BLOOD LOSS:  None.    COMPLICATIONS:  None.    Sedation: Conscious sedation provided by M.D    SEDATION MEDICATIONS: local/IV sedation: Versed 2 mg and fentanyl 75 mcg IV.  Conscious sedation ordered by MD.  Patient reevaluated and sedation administered by MD and monitored by RN.  Total sedation time was less than 10 min.    Total sedation time was <10 min      TECHNIQUE: Laying in a prone position, the patient was prepped and draped in the usual sterile fashion using ChloraPrep and fenestrated drape.  The level was determined under fluoroscopic guidance.  Local anesthetic was given by going down to the hub of the 27-gauge 1.25in needle and raising a wheel.  A 25-gauge 3.5inch needle was introduced to the anatomic local of the medial branch at each of the above levels using fluoroscopy in the AP, oblique, and lateral views.  After negative aspiration, medication was injected slowly. The patient tolerated the procedure well.       The patient was monitored after the procedure.  Patient was given post  procedure and discharge instructions to follow at home.  We will see the patient back in two weeks or the patient may call to inform of status. The patient was discharged in a stable condition

## 2024-11-18 NOTE — PLAN OF CARE
Pt. Transferred to [facility], room 3. Transferred to [facility] by BORA Hankins. Handoff to BORA Benson.

## 2024-11-18 NOTE — H&P
HPI  Patient presenting for Procedure(s) (LRB):  Bilateral L4-5 and L5-S1 MBB (Bilateral)     Patient on Anti-coagulation No    No health changes since previous encounter    Past Medical History:   Diagnosis Date    Allergic rhinitis     Anxiety     Asthma     Diabetes mellitus, type 2     Hyperlipidemia     Hypertension     Personal history of colonic polyps 07/24/2014    Sarcoidosis      Past Surgical History:   Procedure Laterality Date    COLONOSCOPY N/A 07/19/2022    Procedure: COLONOSCOPY;  Surgeon: Elisabeth Rodriguez MD;  Location: [facility];  Service: Endoscopy;  Laterality: N/A;    FOOT SURGERY  at 9y/o    arch lift    INJECTION OF ANESTHETIC AGENT AROUND MEDIAL BRANCH NERVES INNERVATING LUMBAR FACET JOINT Bilateral 07/28/2022    Procedure: diagnostic bilateral L3-5 MBB;  Surgeon: Nick Kruger MD;  Location: [facility];  Service: Pain Management;  Laterality: Bilateral;    INJECTION OF ANESTHETIC AGENT INTO SACROILIAC JOINT Bilateral 06/16/2022    Procedure: Bilateral SIJ Injection;  Surgeon: Sebastien Cameron MD;  Location: [facility];  Service: Pain Management;  Laterality: Bilateral;     Review of patient's allergies indicates:   Allergen Reactions    Lipitor [atorvastatin]      Other reaction(s): Muscle pain        No current facility-administered medications on file prior to encounter.     Current Outpatient Medications on File Prior to Encounter   Medication Sig Dispense Refill    amLODIPine (NORVASC) 5 MG tablet TAKE 1 TABLET(5 MG) BY MOUTH EVERY DAY 90 tablet 3    blood sugar diagnostic Strp Once daily glucose testing. 50 strip 6    diclofenac sodium (VOLTAREN) 1 % Gel Apply 2 g topically 4 (four) times daily. 50 g 0    DULoxetine (CYMBALTA) 60 MG capsule TAKE 1 CAPSULE(60 MG) BY MOUTH EVERY DAY 90 capsule 3    ketoconazole (NIZORAL) 2 % shampoo Apply topically.      lancets (LANCETS,THIN) Misc Once daily glucose testing. 50 each 6    meclizine (ANTIVERT) 25 mg tablet Take 1 tablet (25 mg  "total) by mouth 3 (three) times daily as needed for Dizziness. 30 tablet 0    montelukast (SINGULAIR) 10 mg tablet Take 1 tablet (10 mg total) by mouth every evening. 90 tablet 1    oxybutynin (DITROPAN) 5 MG Tab TAKE 1 TABLET(5 MG) BY MOUTH TWICE DAILY 180 tablet 2    pregabalin (LYRICA) 75 MG capsule Take 1 capsule (75 mg total) by mouth 2 (two) times daily. 60 capsule 1    rosuvastatin (CRESTOR) 40 MG Tab TAKE 1 TABLET(40 MG) BY MOUTH EVERY EVENING 90 tablet 2    tirzepatide 7.5 mg/0.5 mL PnIj Inject 7.5 mg into the skin every 7 days. 12 Pen 1    tiZANidine (ZANAFLEX) 4 MG tablet Take 1 tablet (4 mg total) by mouth every 6 (six) hours as needed. 30 tablet 0    TRUE METRIX AIR GLUCOSE METER Misc USE AS DIRECTED TO TEST BLOOD SUGAR ONCE DAILY 1 each 0        PMHx, PSHx, Allergies, Medications reviewed in epic    ROS negative except pain complaints in HPI    OBJECTIVE:    BP (!) 179/95 (BP Location: Right arm, Patient Position: Sitting)   Pulse 88   Temp 97.2 °F (36.2 °C) (Temporal)   Resp 16   Ht 5' 5" (1.651 m)   Wt 101.6 kg (224 lb 1.6 oz)   LMP 11/23/2013   SpO2 95%   Breastfeeding No   BMI 37.29 kg/m²     PHYSICAL EXAMINATION:    GENERAL: Well appearing, in no acute distress, alert and oriented x3.  PSYCH:  Mood and affect appropriate.  SKIN: Skin color, texture, turgor normal, no rashes or lesions which will impact the procedure.  CV: RRR with palpation of the radial artery.  PULM: No evidence of respiratory difficulty, symmetric chest rise. Clear to auscultation.  NEURO: Cranial nerves grossly intact.    Plan:    Proceed with procedure as planned Procedure(s) (LRB):  Bilateral L4-5 and L5-S1 MBB (Bilateral)    Nick Kruger MD  11/18/2024            "

## 2024-11-18 NOTE — PLAN OF CARE
Pt. More alert and awake. Attempted to lower O2 to 2LNC and O2 dropped to 89%. Pt placed back on 3L NC.

## 2024-11-18 NOTE — DISCHARGE SUMMARY
Discharge Note  Short Stay      SUMMARY     Admit Date: 11/18/2024    Attending Physician: Nick Kruger MD        Discharge Physician: Nick Kruger MD        Discharge Date: 11/18/2024 8:29 AM    Procedure(s) (LRB):  Bilateral L4-5 and L5-S1 MBB (Bilateral)    Final Diagnosis: Lumbar spondylosis [M47.816]    Disposition: Home or self care    Patient Instructions:   Current Discharge Medication List        CONTINUE these medications which have NOT CHANGED    Details   amLODIPine (NORVASC) 5 MG tablet TAKE 1 TABLET(5 MG) BY MOUTH EVERY DAY  Qty: 90 tablet, Refills: 3    Comments: .  Associated Diagnoses: Hypertension goal BP (blood pressure) < 130/80      blood sugar diagnostic Strp Once daily glucose testing.  Qty: 50 strip, Refills: 6    Associated Diagnoses: Controlled type 2 diabetes mellitus without complication, without long-term current use of insulin      diclofenac sodium (VOLTAREN) 1 % Gel Apply 2 g topically 4 (four) times daily.  Qty: 50 g, Refills: 0    Associated Diagnoses: Chronic bilateral low back pain with bilateral sciatica      DULoxetine (CYMBALTA) 60 MG capsule TAKE 1 CAPSULE(60 MG) BY MOUTH EVERY DAY  Qty: 90 capsule, Refills: 3    Associated Diagnoses: Anxiety and depression      ketoconazole (NIZORAL) 2 % shampoo Apply topically.      lancets (LANCETS,THIN) Misc Once daily glucose testing.  Qty: 50 each, Refills: 6    Associated Diagnoses: Controlled type 2 diabetes mellitus without complication, without long-term current use of insulin      meclizine (ANTIVERT) 25 mg tablet Take 1 tablet (25 mg total) by mouth 3 (three) times daily as needed for Dizziness.  Qty: 30 tablet, Refills: 0    Associated Diagnoses: Dizziness      montelukast (SINGULAIR) 10 mg tablet Take 1 tablet (10 mg total) by mouth every evening.  Qty: 90 tablet, Refills: 1    Associated Diagnoses: Sinus congestion      oxybutynin (DITROPAN) 5 MG Tab TAKE 1 TABLET(5 MG) BY MOUTH TWICE DAILY  Qty: 180 tablet, Refills: 2     Associated Diagnoses: Overactive bladder      pregabalin (LYRICA) 75 MG capsule Take 1 capsule (75 mg total) by mouth 2 (two) times daily.  Qty: 60 capsule, Refills: 1    Associated Diagnoses: Numbness and tingling of foot      rosuvastatin (CRESTOR) 40 MG Tab TAKE 1 TABLET(40 MG) BY MOUTH EVERY EVENING  Qty: 90 tablet, Refills: 2    Associated Diagnoses: Hyperlipidemia LDL goal <70      tirzepatide 7.5 mg/0.5 mL PnIj Inject 7.5 mg into the skin every 7 days.  Qty: 12 Pen, Refills: 1    Comments: PLEASE SEND THE PA REQUEST TO THE ATTENTION OF JUSTINO HANCOCK to fax number [phone] - thank you!  Associated Diagnoses: Controlled type 2 diabetes mellitus without complication, without long-term current use of insulin      tiZANidine (ZANAFLEX) 4 MG tablet Take 1 tablet (4 mg total) by mouth every 6 (six) hours as needed.  Qty: 30 tablet, Refills: 0    Associated Diagnoses: Chronic bilateral low back pain with bilateral sciatica      TRUE METRIX AIR GLUCOSE METER Misc USE AS DIRECTED TO TEST BLOOD SUGAR ONCE DAILY  Qty: 1 each, Refills: 0    Associated Diagnoses: Type 2 diabetes mellitus with hyperglycemia, without long-term current use of insulin                 Discharge Diagnosis: Lumbar spondylosis [M47.816]  Condition on Discharge: Stable with no complications to procedure   Diet on Discharge: Same as before.  Activity: as per instruction sheet.  Discharge to: Home with a responsible adult.  Follow up: 2-4 weeks       Please call the office at [phone] if you experience any weakness or loss of sensation, fever > 101.5, pain uncontrolled with oral medications, persistent nausea/vomiting/or diarrhea, redness or drainage from the incisions, or any other worrisome concerns. If physician on call was not reached or could not communicate with our office for any reason please go to the nearest emergency department

## 2024-11-19 ENCOUNTER — TELEPHONE (OUTPATIENT)
Dept: PAIN MEDICINE | Facility: CLINIC | Age: 59
End: 2024-11-19
Payer: COMMERCIAL

## 2024-11-19 NOTE — TELEPHONE ENCOUNTER
1. What percentage of pain relief did you receive following the block, from 0-100%?   80%    2. What was pain score the day before your procedure on a scale from 0-10?  10    3. What was pain score immediately after your procedure (up to 6 hours)  on a scale from 0-10?  2    4. When your pain returned, what was your pain score on a scale from 0-10?  3     5. How many hours did pain relief last following the block?   Over 12 hours     6. During this time, please describe in detail the activities you were able to do?  relaxed      Pain Disability Index (PDI) Score Review:      10/22/2024     8:29 AM 6/4/2020     7:00 AM   Last 3 PDI Scores   Pain Disability Index (PDI) 30 35

## 2024-12-14 DIAGNOSIS — F41.9 ANXIETY AND DEPRESSION: ICD-10-CM

## 2024-12-14 DIAGNOSIS — N32.81 OVERACTIVE BLADDER: ICD-10-CM

## 2024-12-14 DIAGNOSIS — E78.5 HYPERLIPIDEMIA LDL GOAL <70: ICD-10-CM

## 2024-12-14 DIAGNOSIS — F32.A ANXIETY AND DEPRESSION: ICD-10-CM

## 2024-12-14 NOTE — TELEPHONE ENCOUNTER
Care Due:                  Date            Visit Type   Department     Provider  --------------------------------------------------------------------------------                                EP -                              PRIMARY      [facility] INTERNAL  Last Visit: 12-      CARE ([facility])   SHEBA Chavez                              EP -                              PRIMARY      [facility] INTERNAL  Next Visit: 12-      CARE ([facility])   SHEBA Chavez                                                            Last  Test          Frequency    Reason                     Performed    Due Date  --------------------------------------------------------------------------------    HBA1C.......  6 months...  tirzepatide..............  06- 12-    [employer] Embedded Care Due Messages. Reference number: [id number].   12/14/2024 5:15:58 AM CST

## 2024-12-15 RX ORDER — OXYBUTYNIN CHLORIDE 5 MG/1
5 TABLET ORAL 2 TIMES DAILY
Qty: 180 TABLET | Refills: 0 | Status: SHIPPED | OUTPATIENT
Start: 2024-12-15

## 2024-12-15 RX ORDER — ROSUVASTATIN CALCIUM 40 MG/1
40 TABLET, COATED ORAL NIGHTLY
Qty: 90 TABLET | Refills: 0 | Status: SHIPPED | OUTPATIENT
Start: 2024-12-15

## 2024-12-15 NOTE — TELEPHONE ENCOUNTER
Refill Routing Note   Medication(s) are not appropriate for processing by [facility] for the following reason(s):        Required vitals abnormal    [facility] action(s):  Defer  Approve     Requires labs : Yes      Medication Therapy Plan: 11/18/24 (!) 174/95      Appointments  past 12m or future 3m with PCP    Date Provider   Last Visit   12/19/2023 Miranda Chavez DO   Next Visit   12/26/2024 Miranda Chavez DO   ED visits in past 90 days: 0        Note composed:9:55 AM 12/15/2024

## 2024-12-16 RX ORDER — DULOXETIN HYDROCHLORIDE 60 MG/1
CAPSULE, DELAYED RELEASE ORAL
Qty: 90 CAPSULE | Refills: 0 | Status: SHIPPED | OUTPATIENT
Start: 2024-12-16

## 2024-12-26 ENCOUNTER — OFFICE VISIT (OUTPATIENT)
Dept: INTERNAL MEDICINE | Facility: CLINIC | Age: 59
End: 2024-12-26
Payer: COMMERCIAL

## 2024-12-26 ENCOUNTER — LAB VISIT (OUTPATIENT)
Dept: LAB | Facility: HOSPITAL | Age: 59
End: 2024-12-26
Attending: INTERNAL MEDICINE
Payer: COMMERCIAL

## 2024-12-26 VITALS
HEART RATE: 76 BPM | RESPIRATION RATE: 20 BRPM | BODY MASS INDEX: 37.98 KG/M2 | SYSTOLIC BLOOD PRESSURE: 128 MMHG | WEIGHT: 227.94 LBS | DIASTOLIC BLOOD PRESSURE: 84 MMHG | HEIGHT: 65 IN | TEMPERATURE: 97 F | OXYGEN SATURATION: 98 %

## 2024-12-26 DIAGNOSIS — E11.9 CONTROLLED TYPE 2 DIABETES MELLITUS WITHOUT COMPLICATION, WITHOUT LONG-TERM CURRENT USE OF INSULIN: ICD-10-CM

## 2024-12-26 DIAGNOSIS — Z12.31 ENCOUNTER FOR SCREENING MAMMOGRAM FOR MALIGNANT NEOPLASM OF BREAST: ICD-10-CM

## 2024-12-26 DIAGNOSIS — E66.01 SEVERE OBESITY (BMI 35.0-39.9) WITH COMORBIDITY: ICD-10-CM

## 2024-12-26 DIAGNOSIS — Z12.4 CERVICAL CANCER SCREENING: ICD-10-CM

## 2024-12-26 DIAGNOSIS — J45.20 MILD INTERMITTENT ASTHMA WITHOUT COMPLICATION: ICD-10-CM

## 2024-12-26 DIAGNOSIS — E78.5 HYPERLIPIDEMIA LDL GOAL <70: Chronic | ICD-10-CM

## 2024-12-26 DIAGNOSIS — I10 HYPERTENSION GOAL BP (BLOOD PRESSURE) < 130/80: Chronic | ICD-10-CM

## 2024-12-26 DIAGNOSIS — E11.9 CONTROLLED TYPE 2 DIABETES MELLITUS WITHOUT COMPLICATION, WITHOUT LONG-TERM CURRENT USE OF INSULIN: Primary | ICD-10-CM

## 2024-12-26 DIAGNOSIS — Z23 NEED FOR VACCINATION: ICD-10-CM

## 2024-12-26 DIAGNOSIS — M54.16 LUMBAR RADICULOPATHY: ICD-10-CM

## 2024-12-26 PROBLEM — M46.1 SACROILIITIS: Status: RESOLVED | Noted: 2023-12-19 | Resolved: 2024-12-26

## 2024-12-26 LAB
ALBUMIN SERPL BCP-MCNC: 3.7 G/DL (ref 3.5–5.2)
ALP SERPL-CCNC: 83 U/L (ref 40–150)
ALT SERPL W/O P-5'-P-CCNC: 18 U/L (ref 10–44)
ANION GAP SERPL CALC-SCNC: 10 MMOL/L (ref 8–16)
AST SERPL-CCNC: 21 U/L (ref 10–40)
BILIRUB SERPL-MCNC: 0.5 MG/DL (ref 0.1–1)
BUN SERPL-MCNC: 7 MG/DL (ref 6–20)
CALCIUM SERPL-MCNC: 9.4 MG/DL (ref 8.7–10.5)
CHLORIDE SERPL-SCNC: 111 MMOL/L (ref 95–110)
CHOLEST SERPL-MCNC: 231 MG/DL (ref 120–199)
CHOLEST/HDLC SERPL: 5 {RATIO} (ref 2–5)
CO2 SERPL-SCNC: 23 MMOL/L (ref 23–29)
CREAT SERPL-MCNC: 0.9 MG/DL (ref 0.5–1.4)
EST. GFR  (NO RACE VARIABLE): >60 ML/MIN/1.73 M^2
ESTIMATED AVG GLUCOSE: 117 MG/DL (ref 68–131)
GLUCOSE SERPL-MCNC: 115 MG/DL (ref 70–110)
HBA1C MFR BLD: 5.7 % (ref 4–5.6)
HDLC SERPL-MCNC: 46 MG/DL (ref 40–75)
HDLC SERPL: 19.9 % (ref 20–50)
LDLC SERPL CALC-MCNC: 156.8 MG/DL (ref 63–159)
NONHDLC SERPL-MCNC: 185 MG/DL
POTASSIUM SERPL-SCNC: 3.7 MMOL/L (ref 3.5–5.1)
PROT SERPL-MCNC: 7.2 G/DL (ref 6–8.4)
SODIUM SERPL-SCNC: 144 MMOL/L (ref 136–145)
TRIGL SERPL-MCNC: 141 MG/DL (ref 30–150)

## 2024-12-26 PROCEDURE — 80053 COMPREHEN METABOLIC PANEL: CPT | Performed by: INTERNAL MEDICINE

## 2024-12-26 PROCEDURE — 36415 COLL VENOUS BLD VENIPUNCTURE: CPT | Performed by: INTERNAL MEDICINE

## 2024-12-26 PROCEDURE — 80061 LIPID PANEL: CPT | Performed by: INTERNAL MEDICINE

## 2024-12-26 PROCEDURE — 99999 PR PBB SHADOW E&M-EST. PATIENT-LVL V: CPT | Mod: PBBFAC,,, | Performed by: INTERNAL MEDICINE

## 2024-12-26 PROCEDURE — 83036 HEMOGLOBIN GLYCOSYLATED A1C: CPT | Performed by: INTERNAL MEDICINE

## 2024-12-26 RX ORDER — AMLODIPINE BESYLATE 5 MG/1
1 TABLET ORAL EVERY MORNING
COMMUNITY
Start: 2024-11-18 | End: 2024-12-26 | Stop reason: SDUPTHER

## 2024-12-26 RX ORDER — ALBUTEROL SULFATE 90 UG/1
2 INHALANT RESPIRATORY (INHALATION) EVERY 6 HOURS PRN
Qty: 18 G | Refills: 3 | Status: SHIPPED | OUTPATIENT
Start: 2024-12-26

## 2024-12-26 NOTE — PROGRESS NOTES
Orlandohaley Quintero  58 y.o. Black or  female  [id number]    Chief Complaint:  Chief Complaint   Patient presents with    Follow-up     6 months        History of Present Illness:  History of Present Illness    CHIEF COMPLAINT:  Ms. Quintero presents today for follow-up    MEDICATIONS:  She continues amlodipine for blood pressure management, rosuvastatin for cholesterol control, and Mounjaro for diabetes.    LABS AND STUDIES:  A1C was 5.8 six months ago. Liver and kidney function were normal. Cholesterol triglycerides were slightly elevated.    RESPIRATORY:  She reports shortness of breath and chest heaviness due to asthma. She no longer uses an inhaler.    BACK PAIN:  She reports recurrence of lower back pain with radiation to legs one month after receiving injections. She uses a heating pad for relief.    PREVENTIVE CARE:  She received a flu vaccine today. Last pap smear was in 2019. She is due for COVID and tetanus vaccinations.         Review of Systems   Respiratory:  Negative for cough, shortness of breath and wheezing.    Cardiovascular:  Negative for chest pain.   Musculoskeletal:  Positive for back pain.   Neurological:  Negative for dizziness.       Laboratory:  Lab Results   Component Value Date    WBC 5.90 06/26/2024    HGB 12.5 06/26/2024    HCT 39.3 06/26/2024     06/26/2024    CHOL 164 06/26/2024    TRIG 157 (H) 06/26/2024    HDL 43 06/26/2024    ALT 19 06/26/2024    AST 17 06/26/2024     06/26/2024    K 4.2 06/26/2024     (H) 06/26/2024    CREATININE 1.0 06/26/2024    BUN 9 06/26/2024    CO2 24 06/26/2024    TSH 1.230 12/19/2023    HGBA1C 5.8 (H) 06/26/2024     Lab Results   Component Value Date    LDLCALC 89.6 06/26/2024       History:  Past Medical History:   Diagnosis Date    Allergic rhinitis     Anxiety     Asthma     Diabetes mellitus, type 2     Hyperlipidemia     Hypertension     Personal history of colonic polyps 07/24/2014    Sarcoidosis         Medications:  Current Outpatient Medications on File Prior to Visit   Medication Sig Dispense Refill    amLODIPine (NORVASC) 5 MG tablet TAKE 1 TABLET(5 MG) BY MOUTH EVERY DAY 90 tablet 3    blood sugar diagnostic Strp Once daily glucose testing. 50 strip 6    diclofenac sodium (VOLTAREN) 1 % Gel Apply 2 g topically 4 (four) times daily. 50 g 0    DULoxetine (CYMBALTA) 60 MG capsule TAKE 1 CAPSULE(60 MG) BY MOUTH EVERY DAY 90 capsule 0    ketoconazole (NIZORAL) 2 % shampoo Apply topically.      lancets (LANCETS,THIN) Misc Once daily glucose testing. 50 each 6    meclizine (ANTIVERT) 25 mg tablet Take 1 tablet (25 mg total) by mouth 3 (three) times daily as needed for Dizziness. 30 tablet 0    montelukast (SINGULAIR) 10 mg tablet Take 1 tablet (10 mg total) by mouth every evening. 90 tablet 1    oxybutynin (DITROPAN) 5 MG Tab TAKE 1 TABLET(5 MG) BY MOUTH TWICE DAILY 180 tablet 0    pregabalin (LYRICA) 75 MG capsule Take 1 capsule (75 mg total) by mouth 2 (two) times daily. 60 capsule 1    rosuvastatin (CRESTOR) 40 MG Tab TAKE 1 TABLET(40 MG) BY MOUTH EVERY EVENING 90 tablet 0    tirzepatide 7.5 mg/0.5 mL PnIj Inject 7.5 mg into the skin every 7 days. 12 Pen 1    tiZANidine (ZANAFLEX) 4 MG tablet Take 1 tablet (4 mg total) by mouth every 6 (six) hours as needed. 30 tablet 0    TRUE METRIX AIR GLUCOSE METER [facility] USE AS DIRECTED TO TEST BLOOD SUGAR ONCE DAILY 1 each 0     Allergies:  Review of patient's allergies indicates:   Allergen Reactions    Lipitor [atorvastatin]      Other reaction(s): Muscle pain       Exam:  Vitals:    12/26/24 0831   BP: 128/84   Pulse: 76   Resp: 20   Temp: 97.2 °F (36.2 °C)     Weight: 103.4 kg (227 lb 15.3 oz)   Body mass index is 37.93 kg/m².      Physical Exam    Vitals: Reviewed.  Constitutional: No acute distress. Well-developed. Not ill-appearing.  Eyes: No scleral icterus.  Cardiovascular: Normal rate and regular rhythm. Normal heart sounds.  Pulmonary: Pulmonary effort is  normal. No respiratory distress. Normal breath sounds.  Abdomen: Soft. Nontender. Nondistended. Normoactive bowel sounds.  Extremities: No edema.  Skin: Warm. Dry.  Neurological: Alert and oriented to person, place, and time.  Back: Non tender  Psychiatric: Behavior normal.         Assessment:  The primary encounter diagnosis was Controlled type 2 diabetes mellitus without complication, without long-term current use of insulin. Diagnoses of Hypertension goal BP (blood pressure) < 130/80, Hyperlipidemia LDL goal <70, Mild intermittent asthma without complication, Severe obesity (BMI 35.0-39.9) with comorbidity, Encounter for screening mammogram for malignant neoplasm of breast, Need for vaccination, and Cervical cancer screening were also pertinent to this visit.    Assessment & Plan    TYPE 2 DIABETES MELLITUS:  - Reviewed lab results from 6 months ago, noting improvements in A1C and normal liver/kidney function.  - Continued Mounjaro for diabetes.  - Blood work ordered to be done today.    HYPERTENSION:  - Continued amlodipine for blood pressure.    HYPERLIPIDEMIA:  - Continued rosuvastatin for cholesterol.    ASTHMA:  - Evaluated asthma symptoms, considering need for inhaler.  - Started new inhaler for asthma symptoms (prescription to be sent).    LOW BACK PAIN:  - Assessed back pain, noting recurrence after recent injection.  - Ms. Quintero to use heating pad for back pain relief at night.  - Referred to back pain specialist for follow-up.  - Follow up with back pain specialist regarding recurrence of pain and to discuss next steps.    GYNECOLOGICAL EXAMINATION:  - Noted overdue status for pap smear.  - Referred to gynecologist for pap smear.    IMMUNIZATIONS AND PREVENTIVE CARE:  - Considered RSV vaccination due to recent trends in adult infections.  - Assessed need for COVID-19 booster and tetanus vaccination.  - Explained RSV (RSV) and its recent impact on adults.  - Discussed the RSV vaccine, its recent  availability, and one-time administration.  - Ms. Quintero to consider getting RSV vaccine from pharmacy.  - Ms. Quintero to consider getting COVID-19 booster and tetanus vaccine from pharmacy.  - Flu shot administered in office.    FOLLOW-UP AND OTHER:  - Ms. Quintero to schedule eye exam for September.  - Follow up for next appointment (to be scheduled).

## 2024-12-27 ENCOUNTER — PATIENT MESSAGE (OUTPATIENT)
Dept: PAIN MEDICINE | Facility: CLINIC | Age: 59
End: 2024-12-27
Payer: COMMERCIAL

## 2025-01-11 DIAGNOSIS — I10 HYPERTENSION GOAL BP (BLOOD PRESSURE) < 130/80: Chronic | ICD-10-CM

## 2025-01-12 RX ORDER — AMLODIPINE BESYLATE 5 MG/1
TABLET ORAL
Qty: 90 TABLET | Refills: 3 | Status: SHIPPED | OUTPATIENT
Start: 2025-01-12

## 2025-01-12 NOTE — TELEPHONE ENCOUNTER
Refill Decision Note   Leydi Quintero  is requesting a refill authorization.  Brief Assessment and Rationale for Refill:  Approve     Medication Therapy Plan:        Comments:     Note composed:11:52 AM 01/12/2025

## 2025-01-12 NOTE — TELEPHONE ENCOUNTER
No care due was identified.  [facility] Embedded Care Due Messages. Reference number: [id number].   1/12/2025 11:46:22 AM CST

## 2025-01-28 ENCOUNTER — TELEPHONE (OUTPATIENT)
Dept: PHARMACY | Facility: CLINIC | Age: 60
End: 2025-01-28
Payer: COMMERCIAL

## 2025-01-28 NOTE — TELEPHONE ENCOUNTER
[facility]/Population Health Chart Review & Patient Outreach Details For Medication Adherence Project    Reason for Outreach Encounter: 3rd Party payor non-compliance report ([employer], [employer], [employer], etc)  2.  Patient Outreach Method: Reviewed patient chart   3.   Medication in question:    Diabetes Medications               tirzepatide 7.5 mg/0.5 mL PnIj Inject 7.5 mg into the skin every 7 days.                 mounjaro  last filled  1/14 for 28 day supply      4.  Reviewed and or Updates Made To: Patient Chart  5. Outreach Outcomes and/or actions taken: Patient filled medication and is on track to be adherent  Additional Notes:

## 2025-02-19 ENCOUNTER — PATIENT MESSAGE (OUTPATIENT)
Dept: INTERNAL MEDICINE | Facility: CLINIC | Age: 60
End: 2025-02-19
Payer: COMMERCIAL

## 2025-02-19 DIAGNOSIS — E11.9 CONTROLLED TYPE 2 DIABETES MELLITUS WITHOUT COMPLICATION, WITHOUT LONG-TERM CURRENT USE OF INSULIN: ICD-10-CM

## 2025-02-21 RX ORDER — TIRZEPATIDE 10 MG/.5ML
10 INJECTION, SOLUTION SUBCUTANEOUS
Qty: 2 ML | Refills: 2 | Status: SHIPPED | OUTPATIENT
Start: 2025-02-21

## 2025-02-27 ENCOUNTER — TELEPHONE (OUTPATIENT)
Dept: PHARMACY | Facility: CLINIC | Age: 60
End: 2025-02-27
Payer: COMMERCIAL

## 2025-02-27 NOTE — TELEPHONE ENCOUNTER
[facility]/Population Health Chart Review & Patient Outreach Details For Medication Adherence Project    Reason for Outreach Encounter: 3rd Party payor non-compliance report ([employer], BCBS, C, etc)  2.  Patient Outreach Method: Reviewed patient chart   3.   Medication in question:    Diabetes Medications              tirzepatide (MOUNJARO) 10 mg/0.5 mL PnIj Inject 10 mg into the skin every 7 days.                 Mounjaro  last filled  2/26/25 for 28 day supply      4.  Reviewed and or Updates Made To: Patient Chart  5. Outreach Outcomes and/or actions taken: Patient filled medication and is on track to be adherent  Additional Notes:

## 2025-03-25 ENCOUNTER — OFFICE VISIT (OUTPATIENT)
Dept: FAMILY MEDICINE | Facility: CLINIC | Age: 60
End: 2025-03-25
Payer: COMMERCIAL

## 2025-03-25 VITALS
HEART RATE: 76 BPM | DIASTOLIC BLOOD PRESSURE: 86 MMHG | OXYGEN SATURATION: 98 % | BODY MASS INDEX: 37.8 KG/M2 | WEIGHT: 226.88 LBS | SYSTOLIC BLOOD PRESSURE: 126 MMHG | HEIGHT: 65 IN | TEMPERATURE: 97 F

## 2025-03-25 DIAGNOSIS — M79.651 PAIN IN RIGHT THIGH: ICD-10-CM

## 2025-03-25 DIAGNOSIS — H10.33 ACUTE CONJUNCTIVITIS OF BOTH EYES, UNSPECIFIED ACUTE CONJUNCTIVITIS TYPE: ICD-10-CM

## 2025-03-25 DIAGNOSIS — S70.11XA HEMATOMA OF RIGHT THIGH, INITIAL ENCOUNTER: ICD-10-CM

## 2025-03-25 DIAGNOSIS — E66.01 SEVERE OBESITY (BMI 35.0-39.9) WITH COMORBIDITY: ICD-10-CM

## 2025-03-25 DIAGNOSIS — I10 HYPERTENSION GOAL BP (BLOOD PRESSURE) < 130/80: Chronic | ICD-10-CM

## 2025-03-25 DIAGNOSIS — F41.1 GENERALIZED ANXIETY DISORDER: ICD-10-CM

## 2025-03-25 DIAGNOSIS — E78.5 HYPERLIPIDEMIA LDL GOAL <70: Chronic | ICD-10-CM

## 2025-03-25 DIAGNOSIS — E11.9 CONTROLLED TYPE 2 DIABETES MELLITUS WITHOUT COMPLICATION, WITHOUT LONG-TERM CURRENT USE OF INSULIN: ICD-10-CM

## 2025-03-25 DIAGNOSIS — J45.20 MILD INTERMITTENT ASTHMA WITHOUT COMPLICATION: ICD-10-CM

## 2025-03-25 DIAGNOSIS — J30.9 ALLERGIC RHINITIS, UNSPECIFIED SEASONALITY, UNSPECIFIED TRIGGER: ICD-10-CM
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
## 2025-03-25 PROCEDURE — 99999 PR PBB SHADOW E&M-EST. PATIENT-LVL IV: CPT | Mod: PBBFAC,,,

## 2025-03-25 RX ORDER — HYDROXYZINE HYDROCHLORIDE 25 MG/1
25 TABLET, FILM COATED ORAL 2 TIMES DAILY PRN
Qty: 60 TABLET | Refills: 5 | Status: SHIPPED | OUTPATIENT
Start: 2025-03-25

## 2025-03-25 RX ORDER — NEOMYCIN SULFATE, POLYMYXIN B SULFATE AND DEXAMETHASONE 3.5; 10000; 1 MG/ML; [USP'U]/ML; MG/ML
1 SUSPENSION/ DROPS OPHTHALMIC EVERY 8 HOURS
Qty: 5 ML | Refills: 1 | Status: SHIPPED | OUTPATIENT
Start: 2025-03-25 | End: 2025-04-01

## 2025-03-25 RX ORDER — TRAMADOL HYDROCHLORIDE 50 MG/1
50 TABLET ORAL EVERY 8 HOURS PRN
Qty: 15 EACH | Refills: 0 | Status: SHIPPED | OUTPATIENT
Start: 2025-03-25

## 2025-03-25 NOTE — PROGRESS NOTES
Leydi Quintero  03/25/2025  [id number]    Miranda Chavez DO  Patient Care Team:  Miranda Chavez DO as PCP - General (Internal Medicine)  Isaac Major RD, [facility] as Dietitian (Endocrinology)  Vickie Glez LPN as Care Coordinator (Internal Medicine)  Miranda Chavez DO as Diabetes Digital Medicine Responsible Provider (Internal Medicine)  Ambrocio Phillips PharmD as Diabetes Digital Medicine Clinician     Subjective      Chief Complaint:  Chief Complaint   Patient presents with    Chest Pain    Fall    nasal drip       History of Present Illness:    Mrs. Quintero presents today for follow up of recent fall and nasal congestion.  She has a history of asthma, hypercholesterolemia, hypertension, and type 2 diabetes mellitus. She recently fell (1 week ago) after slipping out of the shower, hitting her right thigh on the door. While the pain has improved, she continues to experience discomfort. She has been taking Tylenol for pain management.      Mrs. Quintero also experiences severe nasal congestion upon waking and eye symptoms with crusting. She has a history of allergies. She is currently taking Evelia-Nem[place] Plus with partial improvement but remains congested.    Mr. Quintero also c/o anxiety. She takes Duloxetine for anxiety management. She reports increased stress due to family problems, adjusting to a new boss at work, and other personal matters. She describes difficulty relaxing after work hours.    Review of Systems   Constitutional:  Negative for fever.   HENT:  Positive for nasal congestion.    Eyes:  Positive for pain, discharge, redness and itching. Negative for visual disturbance.   Respiratory:  Negative for shortness of breath.    Cardiovascular:  Negative for chest pain.   Gastrointestinal:  Negative for nausea and vomiting.   Musculoskeletal:  Positive for leg pain and myalgias.   Allergic/Immunologic: Positive for environmental allergies.   Neurological:  Negative for dizziness and  headaches.        History:  Past Medical History:   Diagnosis Date    Allergic rhinitis     Anxiety     Asthma     Diabetes mellitus, type 2     Hyperlipidemia     Hypertension     Personal history of colonic polyps 07/24/2014    Sarcoidosis      Past Surgical History:   Procedure Laterality Date    COLONOSCOPY N/A 07/19/2022    Procedure: COLONOSCOPY;  Surgeon: Elisabeth Rodriguez MD;  Location: [facility] ENDO;  Service: Endoscopy;  Laterality: N/A;    FOOT SURGERY  at 11y/o    arch lift    INJECTION OF ANESTHETIC AGENT AROUND MEDIAL BRANCH NERVES INNERVATING LUMBAR FACET JOINT Bilateral 07/28/2022    Procedure: diagnostic bilateral L3-5 MBB;  Surgeon: iNck Kruger MD;  Location: [facility] PAIN MGT;  Service: Pain Management;  Laterality: Bilateral;    INJECTION OF ANESTHETIC AGENT AROUND MEDIAL BRANCH NERVES INNERVATING LUMBAR FACET JOINT Bilateral 11/18/2024    Procedure: Bilateral L4-5 and L5-S1 MBB;  Surgeon: Nick Kruger MD;  Location: [facility] PAIN MGT;  Service: Pain Management;  Laterality: Bilateral;    INJECTION OF ANESTHETIC AGENT INTO SACROILIAC JOINT Bilateral 06/16/2022    Procedure: Bilateral SIJ Injection;  Surgeon: Sebastien Cameron MD;  Location: [facility] PAIN MGT;  Service: Pain Management;  Laterality: Bilateral;     Family History   Problem Relation Name Age of Onset    Diabetes Mother Sari     Hypertension Mother Sari     Hypertension Father Ever     Hyperlipidemia Father Ever     Breast cancer Sister  58    Colon cancer Neg Hx      Ovarian cancer Neg Hx       Social History[1]     Review of patient's allergies indicates:   Allergen Reactions    Lipitor [atorvastatin]      Other reaction(s): Muscle pain       **The following were reviewed at this visit: active problem list, medication list, allergies, family history, social history, and health maintenance.    Medications:  Medications Ordered Prior to Encounter[2]    **Medications have been reviewed and reconciled with patient at this  visit.            Objective      Vitals:    03/25/25 0821   BP: 126/86   Pulse: 76   Temp: 97.3 °F (36.3 °C)      Body mass index is 37.75 kg/m².    Wt Readings from Last 3 Encounters:   03/25/25 102.9 kg (226 lb 13.7 oz)   12/26/24 103.4 kg (227 lb 15.3 oz)   11/18/24 101.6 kg (224 lb 1.6 oz)     Temp Readings from Last 3 Encounters:   03/25/25 97.3 °F (36.3 °C) (Tympanic)   12/26/24 97.2 °F (36.2 °C) (Tympanic)   11/18/24 97.4 °F (36.3 °C) (Temporal)     BP Readings from Last 3 Encounters:   03/25/25 126/86   12/26/24 128/84   11/18/24 (!) 174/95     Pulse Readings from Last 3 Encounters:   03/25/25 76   12/26/24 76   11/18/24 72         Laboratory Reviewed ({Yes)  Lab Results   Component Value Date    WBC 5.90 06/26/2024    HGB 12.5 06/26/2024    HCT 39.3 06/26/2024     06/26/2024    CHOL 231 (H) 12/26/2024    TRIG 141 12/26/2024    HDL 46 12/26/2024    ALT 18 12/26/2024    AST 21 12/26/2024     12/26/2024    K 3.7 12/26/2024     (H) 12/26/2024    CREATININE 0.9 12/26/2024    BUN 7 12/26/2024    CO2 23 12/26/2024    TSH 1.230 12/19/2023    GLUF 84 01/13/2009    HGBA1C 5.7 (H) 12/26/2024       Physical Exam  Vitals reviewed.   Constitutional:       Appearance: Normal appearance. She is obese.   HENT:      Head: Normocephalic and atraumatic.      Right Ear: Tympanic membrane normal.      Left Ear: Tympanic membrane normal.      Nose: Congestion present.      Mouth/Throat:      Mouth: Mucous membranes are moist.      Pharynx: Oropharynx is clear.   Eyes:      General: Lids are normal. No allergic shiner or visual field deficit.     Extraocular Movements: Extraocular movements intact.      Conjunctiva/sclera:      Right eye: Right conjunctiva is injected. Exudate present.      Left eye: Left conjunctiva is injected. Exudate present.      Pupils: Pupils are equal, round, and reactive to light.   Cardiovascular:      Rate and Rhythm: Normal rate and regular rhythm.      Pulses: Normal pulses.       Heart sounds: Normal heart sounds.   Pulmonary:      Effort: Pulmonary effort is normal.      Breath sounds: Normal breath sounds.   Abdominal:      General: Bowel sounds are normal.      Palpations: Abdomen is soft.   Musculoskeletal:      Right upper leg: Swelling and tenderness present.      Comments: Palpable hematoma tender  upon palpation. Negative for warmth or erythema.   Ambulatory with slight limp   Skin:     General: Skin is warm.      Capillary Refill: Capillary refill takes less than 2 seconds.      Findings: Bruising (to right lateral, anterior thigh) present.   Neurological:      General: No focal deficit present.      Mental Status: She is alert and oriented to person, place, and time.   Psychiatric:         Mood and Affect: Mood normal.         Behavior: Behavior normal.         Thought Content: Thought content normal.         Judgment: Judgment normal.               Assessment      Leydi was seen today for chest pain, fall and nasal drip.    Diagnoses and all orders for this visit:    Fall from slip, trip, or stumble, initial encounter    Hematoma of right thigh, initial encounter  -     traMADoL (ULTRAM) 50 mg tablet; Take 1 tablet (50 mg total) by mouth every 8 (eight) hours as needed for Pain.    Pain in right thigh  -     traMADoL (ULTRAM) 50 mg tablet; Take 1 tablet (50 mg total) by mouth every 8 (eight) hours as needed for Pain.    Allergic rhinitis, unspecified seasonality, unspecified trigger  -     hydrOXYzine HCL (ATARAX) 25 MG tablet; Take 1 tablet (25 mg total) by mouth 2 (two) times daily as needed for Anxiety.    Acute conjunctivitis of both eyes, unspecified acute conjunctivitis type  -     neomycin-polymyxin-dexamethasone (MAXITROL) 3.5mg/mL-10,000 unit/mL-0.1 % DrpS; Place 1 drop into both eyes every 8 (eight) hours. for 7 days    Generalized anxiety disorder  -     hydrOXYzine HCL (ATARAX) 25 MG tablet; Take 1 tablet (25 mg total) by mouth 2 (two) times daily as needed for  Anxiety.    Mild intermittent asthma without complication    Hyperlipidemia LDL goal <70    Hypertension goal BP (blood pressure) < 130/80    Controlled type 2 diabetes mellitus without complication, without long-term current use of insulin    Severe obesity (BMI 35.0-39.9) with comorbidity        Plan    1) Tramadol PRN  2) R.I.C.E  3) hydroxyzine PRN anxiety/allergies (continue Cymbalta)  4) Maxitrol gtts  5) continue all other present management        Follow up: No follow-ups on file.    **After visit summary was printed and given to patient upon discharge today.  Patient goals and care plan are included in After Visit Summary.    I have spent a total of 30 minutes on this visit. This includes face to face time and non-face to face time preparing to see the patient (eg, review of tests), obtaining and/or reviewing separately obtained history, documenting clinical information in the electronic or other health record, independently interpreting results and communicating results to the patient/family/caregiver, or care coordinator.      **This note was generated with the assistance of ambient listening technology. Verbal consent was obtained by the patient and accompanying visitor(s) for the recording of patient appointment to facilitate this note. I attest to having reviewed and edited the generated note for accuracy, though some syntax or spelling errors may persist. Please contact the author of this note for any clarification.         Rosario Siu, MSN, APRN, FNP-C         [1]   Social History  Socioeconomic History    Marital status:     Number of children: 1   Occupational History     Employer: [employer]   Tobacco Use    Smoking status: Never    Smokeless tobacco: Never   Substance and Sexual Activity    Alcohol use: No    Drug use: No    Sexual activity: Yes     Partners: Male     Birth control/protection: Post-menopausal   [2]   Current Outpatient Medications on File Prior to Visit    Medication Sig Dispense Refill    albuterol (VENTOLIN HFA) 90 mcg/actuation inhaler Inhale 2 puffs into the lungs every 6 (six) hours as needed for Wheezing. 18 g 3    amLODIPine (NORVASC) 5 MG tablet TAKE 1 TABLET(5 MG) BY MOUTH EVERY DAY 90 tablet 3    blood sugar diagnostic Strp Once daily glucose testing. 50 strip 6    diclofenac sodium (VOLTAREN) 1 % Gel Apply 2 g topically 4 (four) times daily. 50 g 0    DULoxetine (CYMBALTA) 60 MG capsule TAKE 1 CAPSULE(60 MG) BY MOUTH EVERY DAY 90 capsule 0    lancets (LANCETS,THIN) Misc Once daily glucose testing. 50 each 6    montelukast (SINGULAIR) 10 mg tablet Take 1 tablet (10 mg total) by mouth every evening. 90 tablet 1    oxybutynin (DITROPAN) 5 MG Tab TAKE 1 TABLET(5 MG) BY MOUTH TWICE DAILY 180 tablet 0    rosuvastatin (CRESTOR) 40 MG Tab TAKE 1 TABLET(40 MG) BY MOUTH EVERY EVENING 90 tablet 0    tirzepatide (MOUNJARO) 10 mg/0.5 mL PnIj Inject 10 mg into the skin every 7 days. 2 mL 2    TRUE METRIX AIR GLUCOSE METER [facility] USE AS DIRECTED TO TEST BLOOD SUGAR ONCE DAILY 1 each 0    ketoconazole (NIZORAL) 2 % shampoo Apply topically. (Patient not taking: Reported on 3/25/2025)      meclizine (ANTIVERT) 25 mg tablet Take 1 tablet (25 mg total) by mouth 3 (three) times daily as needed for Dizziness. (Patient not taking: Reported on 3/25/2025) 30 tablet 0    pregabalin (LYRICA) 75 MG capsule Take 1 capsule (75 mg total) by mouth 2 (two) times daily. (Patient not taking: Reported on 3/25/2025) 60 capsule 1    tiZANidine (ZANAFLEX) 4 MG tablet Take 1 tablet (4 mg total) by mouth every 6 (six) hours as needed. (Patient not taking: Reported on 3/25/2025) 30 tablet 0     No current facility-administered medications on file prior to visit.

## 2025-03-27 ENCOUNTER — TELEPHONE (OUTPATIENT)
Dept: PHARMACY | Facility: CLINIC | Age: 60
End: 2025-03-27
Payer: COMMERCIAL

## 2025-03-28 NOTE — TELEPHONE ENCOUNTER
[facility]/Population Health Chart Review & Patient Outreach Details For Medication Adherence Project    Reason for Outreach Encounter: 3rd Party payor non-compliance report ([employer], [employer], C, etc)  2.  Patient Outreach Method: Reviewed patient chart   3.   Medication in question:    Diabetes Medications              tirzepatide (MOUNJARO) 10 mg/0.5 mL PnIj Inject 10 mg into the skin every 7 days.                 LF 28 ds 3/26/25    4.  Reviewed and or Updates Made To: Patient Chart  5. Outreach Outcomes and/or actions taken: Patient filled medication and is on track to be adherent  Additional Notes:

## 2025-04-11 ENCOUNTER — TELEPHONE (OUTPATIENT)
Dept: PHARMACY | Facility: CLINIC | Age: 60
End: 2025-04-11
Payer: COMMERCIAL

## 2025-04-11 NOTE — TELEPHONE ENCOUNTER
[facility]/Population Health Chart Review & Patient Outreach Details For Medication Adherence Project    Reason for Outreach Encounter: 3rd Party payor non-compliance report ([employer], [employer], C, etc)  2.  Patient Outreach Method: Reviewed patient chart   3.   Medication in question:    Diabetes Medications              tirzepatide (MOUNJARO) 10 mg/0.5 mL PnIj Inject 10 mg into the skin every 7 days.                 mounjaro  last filled  3/26 for 28 day supply      4.  Reviewed and or Updates Made To: Patient Chart  5. Outreach Outcomes and/or actions taken: Patient filled medication and is on track to be adherent  Additional Notes:

## 2025-05-02 NOTE — PROGRESS NOTES
Established Patient       Chief Pain Complaint:  Low back pain     Interval History (5/6/2025):  Patient Leydi Quintero presents today for follow-up visit.  Patient was last seen on 11/18/2024 bilateral L4/5 + L5/S1 MBB with 80% relief. She had a bilateral L4/5 + L5/S1 MBB on 7/28/2022 with 90% relief. She rates her pain today a 4/10 and it will go up to an 8/10.  Pain is worse with prolonged standing and she will get relief if she leans forward onto a counter top.  Pain remains primarily axial in the lower back. At times with prolonged activity she will have some referred pain into the thighs.  She is willing to move forward with the radiofrequency ablation.  Patient denies night fever/night sweats, urinary incontinence, bowel incontinence, significant weight loss and significant motor weakness.   Patient denies any other complaints or concerns at this time.      Interval History (10/22/2024):  Patient Leydi Quintero presents today for follow-up visit.  Patient is here for follow up of low back pain. She had a bilateral L4/5 + L5/S1 MBB on 7/28/2022 with 90% relief which provided additional relief for a year. Pain has returned in the same location. Pain is 6/10 and primarily axial in the low back. Pain is worse with prolonged standing and extending back. At times she has referred pain in the sides of the legs to the feet. She takes tylenol and zanaflex as needed.  Patient denies night fever/night sweats, urinary incontinence, bowel incontinence, significant weight loss and significant motor weakness.   Patient denies any other complaints or concerns at this time.      Interval History (7/20/2022): Leydi Quintero presents today for follow-up on telemedicine visit.  she underwent bilateral SIJ injection on 6/16/22.  The patient reports that she is/was better following the procedure. Reports 90% pain relief on left, 50% pain relief on right.  The changes lasted 4 weeks so far.  The changes have  continued through this visit.   Lyrica was started at previous visit, but she ran out so is no longer taking. She continues taking Zanaflex 4mg PRN.    Interval History (4/29/2022):  Leydi Quintero presents today for follow-up visit.  Patient was last seen almost 2 years ago. At that visit, the plan was to get EMG of the legs to evaluate numbness in the feet, which she canceled due to COVID at the time.  She feels the pain worsens with activity.  Pain is intermittent, comes and goes and is never constant.  She has done physical therapy in the past with relief, but the pain returns shortly after she completes the sessions.  She could not tolerate gabapentin in the past.  Lyrica was not approved by insurance.  She was recently given Zanaflex from her primary care, which she took last night, and she got a good night's rest for the 1st time in a while.  She was also given Voltaren gel, which was not yet approved by insurance.    History of Present Illness (6/18/20):   Ms. Quintero returns to clinic for follow-up.  She has obtained her lumbar MRI which we will review today.  She continues have pain located primarily low back in the posterior lower legs.  She rates her pain currently as a 4/10.  She continues to have pain located in the axial lumbar spine in addition to the bilateral lower extremities.  MRI shows minor facet arthropathy L3/4-L4/5 in addition to minimal left L4/5 neural foraminal stenosis.  She has started on gabapentin however this was causing excessive drowsiness in addition to an increase in appetite therefore she has been switched to Lyrica 50 mg capsules twice daily which she has found to be much more helpful.  She continues to have pain in her lower legs in a nondermatomal distribution but reports that radiates from her low back distally into her feet.  She reports the right leg is worse than the left leg currently.  She completed physical therapy of greater than 6 weeks in February of 2020 and  continues to perform therapy exercises at home as tolerated.    Initial HPI (6/4/20):  This patient is a 54 y.o. female who presents today complaining of the above noted pain/s. The patient describes the pain as follows.  Ms. Quintero is new patient clinic with complaints of low back and leg pain for approximately one year.  There was no inciting event and currently rates her pain as 8/10.  She describes mostly a sharp, aching, with numbness and tingling in her legs bilaterally.  Her symptoms worse with walking and standing for long periods of time her somewhat improved with rest and propping her feet up.  She has not really tried anything with medications however she has been physical therapy which he completed 4 months ago greater than 6 weeks physical therapy with some benefit.  She continues to walk for exercise at home in addition to performing physical therapy exercises.  She has tried Tylenol over-the-counter with minimal symptomatic pain relief.  She finds that her right leg seems to be worse in the left leg and she endorses having numbness, weakness, tingling in bilateral lower extremities.  She does wear compression socks which did provide some benefit however these of loss her effectiveness.  She denies having had surgery injections in her lumbar spine.  She finds that heat and massage do provide some benefit.    Previous Therapy:  Medications:  Tylenol, gabapentin, Lyrica  Injections:   - bilateral SIJ injection on 6/16/22 with 90% pain relief on left, 50% on right  bilateral L4/5 + L5/S1 MBB on 7/28/2022 with 90% relief    Surgeries: No spinal surgery   Physical Therapy: Completed in the Past, completed greater than 6 weeks of physical therapy approximately 4 months ago       Imaging / Labs / Studies (reviewed on 5/6/2025):    06/16/2020 MRI Lumbar Spine Without Contrast  COMPARISON: None    FINDINGS:  The distal cord and conus reveal normal signal and morphology. The lumbar vertebra reveal normal  "alignment, shape and signal intensity.    T12-L1: Unremarkable.    L1-2:  Unremarkable.  Incidental demonstration of a 3 cm right renal cysts.    L2-3:  Unremarkable.    L3-4:  Minor facet hypertrophy.    L4-5:  Mild disc desiccation with annular bulge.  Left foraminal annular fissure.  Minor left foraminal stenosis.  Minor facet hypertrophy.    L5-S1:  Unremarkable.    Impression  Mild L4-5 degenerative disc disease with annular bulge and left foraminal annular fissure.  Minor left foraminal stenosis.  Minor multilevel facet arthrosis.        Review of Systems:  Constitutional: Negative for fever.   Eyes: Negative for blurred vision.   Respiratory: Negative for cough and wheezing.    Cardiovascular: Negative for chest pain and orthopnea.   Gastrointestinal: Negative for constipation, diarrhea, nausea and vomiting.   Genitourinary: Negative for dysuria.   Musculoskeletal: Positive for back pain.        Lumbar radiculopathy   Skin: Negative for itching and rash.   Neurological: Positive for tingling and weakness.   Endo/Heme/Allergies: Does not bruise/bleed easily.       Physical Exam:    Vitals:    05/06/25 0816   BP: 133/66   BP Location: Left arm   Pulse: 79   Weight: 102.7 kg (226 lb 4.8 oz)   Height: 5' 5" (1.651 m)         Body mass index is 37.66 kg/m².   (reviewed on 5/6/2025)     General: alert and oriented, in no apparent distress  Gait: normal gait.  Skin: no rashes, no discoloration, no obvious lesions  HEENT: normocephalic, atraumatic. Pupils equal and round.  Cardiovascular: no significant peripheral edema present.  Respiratory: without use of accessory muscles of respiration.    Musculoskeletal - Lumbar Spine:  - Spinal Sensation: Normal   - Pain on flexion of lumbar spine: Absent  - Pain on extension of lumbar spine: Present  - Lumbar facet loading: Present bilaterally  - TTP over the lumbar facet joints: Present   - TTP over the lumbar paraspinals: Present   - TTP over the SI joints: Absent  - TTP " over GT bursa: Absent  - Straight Leg Raise: Equivocal       Neuro - Lower Extremities:  - RLE Strength:     >> 5/5 strength with right hip flexion/ extension    >> 5/5 strength with right knee flexion/ extension    >> 5/5 strength in right ankle with dorsiflexion    >> 5/5 strength in right ankle with plantar flexion  - LLE Strength:     >> 5/5 strength with left hip flexion/ extension    >> 5/5 strength with knee flexion extension on the left     >> 5/5 strength in left ankle with dorsiflexion    >> 5/5 strength in left ankle with plantar flexion  - BLE Strength: R/L: HF: 5/5, HE: 5/5, KF: 5/5; KE: 5/5; FE: 5/5; FF: 5/5  - Extremity Reflexes: Brisk and symmetric throughout  - Sensory: Sensation to light touch intact bilaterally      Psych:  Mood and affect is appropriate      Assessment  1. 59 y.o. year old patient with PMH of   Past Medical History:   Diagnosis Date    Allergic rhinitis     Anxiety     Asthma     Diabetes mellitus, type 2     Hyperlipidemia     Hypertension     Personal history of colonic polyps 07/24/2014    Sarcoidosis       presenting with pain located lumbar spine:    ICD-10-CM ICD-9-CM    1. Lumbar spondylosis  M47.816 721.3 Case Request-RAD/Other Procedure Area: bilateral L4/5 & L5/S1 RFA      2. Facet hypertrophy of lumbar region  M47.816 721.3       3. Lumbar radiculopathy  M54.16 724.4                   PLAN:  1. Interventional: Schedule bilateral L4/5 + L5/S1 RFA  Explained the risks and benefits of the procedure in detail with the patient today in clinic along with alternative treatment options, and the patient elected to pursue the intervention.        S/p bilateral L4/5 + L5/S1 MBB on 7/28/2022 with 90% relief  - S/p bilateral SIJ injection on 6/16/22 with 90% pain relief on left, 50% on right    - If leg pain continues, consider bilateral L4/5 TF MYNOR      2. Pharmacologic:   - Pt Dced lyrica- drowsiness    - Continue Zanaflex 4 mg QHS PRN. We have discussed potential deleterious  side effects associated with this medication including  dizziness, drowsiness, dry mouth or tingling sensation in the upper or lower extremities.     - Anticoagulation use: None.     3. Rehabilitative: Encouraged regular exercise. She previously completed greater than 6 weeks physical therapy in the past with minimal symptomatic relief; she continues to walk for exercise and performs physical therapy exercises at home on a daily basis.     4. Diagnostic: BLE EMG/NCS previously not completed. Consider reordering.  Will reorder to evaluate foot numbness, which does not appear to be from lumbar radicular sources.  Has a history of diabetes.  Lumbar MRI reviewed today    5. Follow up: 5 weeks after RFA    - This condition does not require this patient to take time off of work, and the primary goal of our Pain Management services is to improve the patient's functional capacity.   - I discussed the risks, benefits, and alternatives to potential treatment options. All questions and concerns were fully addressed today in clinic.     Visit today included increased complexity associated with the care of the episodic problem of chronic pain which was addressed and continue to manage the longitudinal care of the patient due to the serious and/or complex managed problem(s) listed above.    Romy Mota NP     Disclaimer:  This note was prepared using voice recognition system and is likely to have sound alike errors that may have been overlooked even after proof reading.  Please call me with any questions.

## 2025-05-05 ENCOUNTER — TELEPHONE (OUTPATIENT)
Dept: PAIN MEDICINE | Facility: CLINIC | Age: 60
End: 2025-05-05
Payer: COMMERCIAL

## 2025-05-06 ENCOUNTER — OFFICE VISIT (OUTPATIENT)
Dept: PAIN MEDICINE | Facility: CLINIC | Age: 60
End: 2025-05-06
Payer: COMMERCIAL

## 2025-05-06 VITALS
HEART RATE: 79 BPM | DIASTOLIC BLOOD PRESSURE: 66 MMHG | HEIGHT: 65 IN | SYSTOLIC BLOOD PRESSURE: 133 MMHG | WEIGHT: 226.31 LBS | BODY MASS INDEX: 37.7 KG/M2

## 2025-05-06 DIAGNOSIS — M47.816 LUMBAR SPONDYLOSIS: Primary | ICD-10-CM

## 2025-05-06 DIAGNOSIS — M54.16 LUMBAR RADICULOPATHY: ICD-10-CM

## 2025-05-06 DIAGNOSIS — M47.816 FACET HYPERTROPHY OF LUMBAR REGION: ICD-10-CM

## 2025-05-06 PROCEDURE — 3078F DIAST BP <80 MM HG: CPT | Mod: CPTII,S$GLB,, | Performed by: NURSE PRACTITIONER

## 2025-05-06 PROCEDURE — G2211 COMPLEX E/M VISIT ADD ON: HCPCS | Mod: S$GLB,,, | Performed by: NURSE PRACTITIONER

## 2025-05-06 PROCEDURE — 99999 PR PBB SHADOW E&M-EST. PATIENT-LVL III: CPT | Mod: PBBFAC,,, | Performed by: NURSE PRACTITIONER

## 2025-05-06 PROCEDURE — 3075F SYST BP GE 130 - 139MM HG: CPT | Mod: CPTII,S$GLB,, | Performed by: NURSE PRACTITIONER

## 2025-05-06 PROCEDURE — 1159F MED LIST DOCD IN RCRD: CPT | Mod: CPTII,S$GLB,, | Performed by: NURSE PRACTITIONER

## 2025-05-06 PROCEDURE — 3008F BODY MASS INDEX DOCD: CPT | Mod: CPTII,S$GLB,, | Performed by: NURSE PRACTITIONER

## 2025-05-06 PROCEDURE — 99214 OFFICE O/P EST MOD 30 MIN: CPT | Mod: S$GLB,,, | Performed by: NURSE PRACTITIONER

## 2025-05-07 NOTE — PRE-PROCEDURE INSTRUCTIONS
Spoke with patient regarding procedure scheduled on 5.21     Arrival time 0600     Has patient been sick with fever or on antibiotics within the last 7 days? No     Does the patient have any open wounds, sores or rashes? No     Does the patient have any recent fractures? no     Has patient received a vaccination within the last 7 days? No     Received the COVID vaccination? yes     Has the patient stopped all medications as directed? na     Does patient have a pacemaker, defibrillator, or implantable stimulator? No     Does the patient have a ride to and from procedure and someone reliable to remain with patient?       Is the patient diabetic? YES     Does the patient have sleep apnea? Or use O2 at home? no     Is the patient receiving sedation? Yes      Is the patient instructed to remain NPO beginning at midnight the night before their procedure? yes     Procedure location confirmed with patient? Yes     Covid- Denies signs/symptoms. Instructed to notify PAT/MD if any changes.                16-Feb-2024

## 2025-05-21 ENCOUNTER — HOSPITAL ENCOUNTER (OUTPATIENT)
Facility: HOSPITAL | Age: 60
Discharge: HOME | End: 2025-05-21
Attending: ANESTHESIOLOGY | Admitting: ANESTHESIOLOGY
Payer: COMMERCIAL

## 2025-05-21 VITALS
RESPIRATION RATE: 15 BRPM | HEART RATE: 67 BPM | HEIGHT: 65 IN | TEMPERATURE: 98 F | BODY MASS INDEX: 37.92 KG/M2 | WEIGHT: 227.63 LBS | OXYGEN SATURATION: 98 % | SYSTOLIC BLOOD PRESSURE: 141 MMHG | DIASTOLIC BLOOD PRESSURE: 69 MMHG

## 2025-05-21 DIAGNOSIS — M47.816 LUMBAR SPONDYLOSIS: Primary | ICD-10-CM

## 2025-05-21 DIAGNOSIS — M47.816 LUMBAR SPONDYLOSIS: ICD-10-CM

## 2025-05-21 LAB — POCT GLUCOSE: 114 MG/DL (ref 70–110)

## 2025-05-21 PROCEDURE — 63600175 PHARM REV CODE 636 W HCPCS: Performed by: ANESTHESIOLOGY

## 2025-05-21 PROCEDURE — 64636 DESTROY L/S FACET JNT ADDL: CPT | Mod: 50,74

## 2025-05-21 PROCEDURE — 64635 DESTROY LUMB/SAC FACET JNT: CPT | Mod: 50,74

## 2025-05-21 RX ORDER — FENTANYL CITRATE 50 UG/ML
INJECTION, SOLUTION INTRAMUSCULAR; INTRAVENOUS
Status: DISCONTINUED | OUTPATIENT
Start: 2025-05-21 | End: 2025-05-21 | Stop reason: HOSPADM

## 2025-05-21 RX ORDER — MIDAZOLAM HYDROCHLORIDE 1 MG/ML
INJECTION, SOLUTION INTRAMUSCULAR; INTRAVENOUS
Status: DISCONTINUED | OUTPATIENT
Start: 2025-05-21 | End: 2025-05-21 | Stop reason: HOSPADM

## 2025-05-21 RX ORDER — METHYLPREDNISOLONE ACETATE 40 MG/ML
INJECTION, SUSPENSION INTRA-ARTICULAR; INTRALESIONAL; INTRAMUSCULAR; SOFT TISSUE
Status: DISCONTINUED | OUTPATIENT
Start: 2025-05-21 | End: 2025-05-21 | Stop reason: HOSPADM

## 2025-05-21 RX ORDER — ONDANSETRON HYDROCHLORIDE 2 MG/ML
4 INJECTION, SOLUTION INTRAVENOUS ONCE AS NEEDED
Status: DISCONTINUED | OUTPATIENT
Start: 2025-05-21 | End: 2025-05-21 | Stop reason: HOSPADM

## 2025-05-21 RX ORDER — LIDOCAINE HYDROCHLORIDE 10 MG/ML
INJECTION, SOLUTION EPIDURAL; INFILTRATION; INTRACAUDAL; PERINEURAL
Status: DISCONTINUED | OUTPATIENT
Start: 2025-05-21 | End: 2025-05-21 | Stop reason: HOSPADM

## 2025-05-21 RX ORDER — BUPIVACAINE HYDROCHLORIDE 2.5 MG/ML
INJECTION, SOLUTION EPIDURAL; INFILTRATION; INTRACAUDAL; PERINEURAL
Status: DISCONTINUED | OUTPATIENT
Start: 2025-05-21 | End: 2025-05-21 | Stop reason: HOSPADM

## 2025-05-21 NOTE — DISCHARGE SUMMARY
Discharge Note  Short Stay      SUMMARY     Admit Date: 5/21/2025    Attending Physician: Nick Kruger MD        Discharge Physician: Nick Kruger MD        Discharge Date: 5/21/2025 7:19 AM    Final Diagnosis: Lumbar spondylosis [M47.816]    Disposition: Home or self care    Patient Instructions:   Current Discharge Medication List        CONTINUE these medications which have NOT CHANGED    Details   albuterol (VENTOLIN HFA) 90 mcg/actuation inhaler Inhale 2 puffs into the lungs every 6 (six) hours as needed for Wheezing.  Qty: 18 g, Refills: 3    Associated Diagnoses: Mild intermittent asthma without complication      amLODIPine (NORVASC) 5 MG tablet TAKE 1 TABLET(5 MG) BY MOUTH EVERY DAY  Qty: 90 tablet, Refills: 3    Comments: .  Associated Diagnoses: Hypertension goal BP (blood pressure) < 130/80      blood sugar diagnostic Strp Once daily glucose testing.  Qty: 50 strip, Refills: 6    Associated Diagnoses: Controlled type 2 diabetes mellitus without complication, without long-term current use of insulin      diclofenac sodium (VOLTAREN) 1 % Gel Apply 2 g topically 4 (four) times daily.  Qty: 50 g, Refills: 0    Associated Diagnoses: Chronic bilateral low back pain with bilateral sciatica      DULoxetine (CYMBALTA) 60 MG capsule TAKE 1 CAPSULE(60 MG) BY MOUTH EVERY DAY  Qty: 90 capsule, Refills: 0    Associated Diagnoses: Anxiety and depression      hydrOXYzine HCL (ATARAX) 25 MG tablet Take 1 tablet (25 mg total) by mouth 2 (two) times daily as needed for Anxiety.  Qty: 60 tablet, Refills: 5    Associated Diagnoses: Allergic rhinitis, unspecified seasonality, unspecified trigger; Generalized anxiety disorder      ketoconazole (NIZORAL) 2 % shampoo Apply topically.      lancets (LANCETS,THIN) Misc Once daily glucose testing.  Qty: 50 each, Refills: 6    Associated Diagnoses: Controlled type 2 diabetes mellitus without complication, without long-term current use of insulin      meclizine (ANTIVERT) 25  mg tablet Take 1 tablet (25 mg total) by mouth 3 (three) times daily as needed for Dizziness.  Qty: 30 tablet, Refills: 0    Associated Diagnoses: Dizziness      montelukast (SINGULAIR) 10 mg tablet Take 1 tablet (10 mg total) by mouth every evening.  Qty: 90 tablet, Refills: 1    Associated Diagnoses: Sinus congestion      oxybutynin (DITROPAN) 5 MG Tab TAKE 1 TABLET(5 MG) BY MOUTH TWICE DAILY  Qty: 180 tablet, Refills: 0    Associated Diagnoses: Overactive bladder      pregabalin (LYRICA) 75 MG capsule Take 1 capsule (75 mg total) by mouth 2 (two) times daily.  Qty: 60 capsule, Refills: 1    Associated Diagnoses: Numbness and tingling of foot      rosuvastatin (CRESTOR) 40 MG Tab TAKE 1 TABLET(40 MG) BY MOUTH EVERY EVENING  Qty: 90 tablet, Refills: 0    Associated Diagnoses: Hyperlipidemia LDL goal <70      tirzepatide (MOUNJARO) 10 mg/0.5 mL PnIj Inject 10 mg into the skin every 7 days.  Qty: 2 mL, Refills: 2    Associated Diagnoses: Controlled type 2 diabetes mellitus without complication, without long-term current use of insulin      tiZANidine (ZANAFLEX) 4 MG tablet Take 1 tablet (4 mg total) by mouth every 6 (six) hours as needed.  Qty: 30 tablet, Refills: 0    Associated Diagnoses: Chronic bilateral low back pain with bilateral sciatica      traMADoL (ULTRAM) 50 mg tablet Take 1 tablet (50 mg total) by mouth every 8 (eight) hours as needed for Pain.  Qty: 15 each, Refills: 0    Comments: Quantity prescribed more than 7 day supply? Yes, quantity medically necessary  Associated Diagnoses: Hematoma of right thigh, initial encounter; Pain in right thigh      TRUE METRIX AIR GLUCOSE METER Misc USE AS DIRECTED TO TEST BLOOD SUGAR ONCE DAILY  Qty: 1 each, Refills: 0    Associated Diagnoses: Type 2 diabetes mellitus with hyperglycemia, without long-term current use of insulin                 Discharge Diagnosis: Lumbar spondylosis [M47.816]  Condition on Discharge: Stable  Diet on Discharge: Same as  before.  Activity: as per instruction sheet.  Discharge to: Home with a responsible adult. Patient will have procedure with area that can provide propofol sedation.          Please call the office at [phone] if you experience any weakness or loss of sensation, fever > 101.5, pain uncontrolled with oral medications, persistent nausea/vomiting/or diarrhea, redness or drainage from the incisions, or any other worrisome concerns. If physician on call was not reached or could not communicate with our office for any reason please go to the nearest emergency department

## 2025-05-21 NOTE — H&P
"HPI  Patient presenting for Procedure(s) (LRB):  bilateral L4/5 & L5/S1 RFA (Bilateral)     Patient on Anti-coagulation No    No health changes since previous encounter    Past Medical History:   Diagnosis Date    Allergic rhinitis     Anxiety     Asthma     Diabetes mellitus, type 2     Hyperlipidemia     Hypertension     Personal history of colonic polyps 07/24/2014    Sarcoidosis      Past Surgical History:   Procedure Laterality Date    COLONOSCOPY N/A 07/19/2022    Procedure: COLONOSCOPY;  Surgeon: Elisabeth Rodriguez MD;  Location: [facility];  Service: Endoscopy;  Laterality: N/A;    FOOT SURGERY  at 9y/o    arch lift    INJECTION OF ANESTHETIC AGENT AROUND MEDIAL BRANCH NERVES INNERVATING LUMBAR FACET JOINT Bilateral 07/28/2022    Procedure: diagnostic bilateral L3-5 MBB;  Surgeon: Nick Kruger MD;  Location: [facility];  Service: Pain Management;  Laterality: Bilateral;    INJECTION OF ANESTHETIC AGENT AROUND MEDIAL BRANCH NERVES INNERVATING LUMBAR FACET JOINT Bilateral 11/18/2024    Procedure: Bilateral L4-5 and L5-S1 MBB;  Surgeon: Nick Kruger MD;  Location: [facility];  Service: Pain Management;  Laterality: Bilateral;    INJECTION OF ANESTHETIC AGENT INTO SACROILIAC JOINT Bilateral 06/16/2022    Procedure: Bilateral SIJ Injection;  Surgeon: Sebastien Cameron MD;  Location: [facility];  Service: Pain Management;  Laterality: Bilateral;     Review of patient's allergies indicates:   Allergen Reactions    Lipitor [atorvastatin]      Other reaction(s): Muscle pain        Medications Ordered Prior to Encounter[1]     PMHx, PSHx, Allergies, Medications reviewed in epic    ROS negative except pain complaints in HPI    OBJECTIVE:    BP (!) 163/79   Pulse 77   Temp 97 °F (36.1 °C)   Resp 15   Ht 5' 5" (1.651 m)   Wt 103.3 kg (227 lb 10 oz)   LMP 11/23/2013   SpO2 98%   Breastfeeding No   BMI 37.88 kg/m²     PHYSICAL EXAMINATION:    GENERAL: Well appearing, in no acute distress, alert " and oriented x3.  PSYCH:  Mood and affect appropriate.  SKIN: Skin color, texture, turgor normal, no rashes or lesions which will impact the procedure.  CV: RRR with palpation of the radial artery.  PULM: No evidence of respiratory difficulty, symmetric chest rise. Clear to auscultation.  NEURO: Cranial nerves grossly intact.    Plan:    Proceed with procedure as planned Procedure(s) (LRB):  bilateral L4/5 & L5/S1 RFA (Bilateral)    Nick Kruger MD  05/21/2025                 [1]   No current facility-administered medications on file prior to encounter.     Current Outpatient Medications on File Prior to Encounter   Medication Sig Dispense Refill    albuterol (VENTOLIN HFA) 90 mcg/actuation inhaler Inhale 2 puffs into the lungs every 6 (six) hours as needed for Wheezing. 18 g 3    amLODIPine (NORVASC) 5 MG tablet TAKE 1 TABLET(5 MG) BY MOUTH EVERY DAY 90 tablet 3    blood sugar diagnostic Strp Once daily glucose testing. 50 strip 6    diclofenac sodium (VOLTAREN) 1 % Gel Apply 2 g topically 4 (four) times daily. 50 g 0    DULoxetine (CYMBALTA) 60 MG capsule TAKE 1 CAPSULE(60 MG) BY MOUTH EVERY DAY 90 capsule 0    hydrOXYzine HCL (ATARAX) 25 MG tablet Take 1 tablet (25 mg total) by mouth 2 (two) times daily as needed for Anxiety. 60 tablet 5    ketoconazole (NIZORAL) 2 % shampoo Apply topically.      lancets (LANCETS,THIN) Misc Once daily glucose testing. 50 each 6    meclizine (ANTIVERT) 25 mg tablet Take 1 tablet (25 mg total) by mouth 3 (three) times daily as needed for Dizziness. (Patient not taking: Reported on 5/6/2025) 30 tablet 0    montelukast (SINGULAIR) 10 mg tablet Take 1 tablet (10 mg total) by mouth every evening. 90 tablet 1    oxybutynin (DITROPAN) 5 MG Tab TAKE 1 TABLET(5 MG) BY MOUTH TWICE DAILY 180 tablet 0    pregabalin (LYRICA) 75 MG capsule Take 1 capsule (75 mg total) by mouth 2 (two) times daily. (Patient not taking: Reported on 5/6/2025) 60 capsule 1    rosuvastatin (CRESTOR) 40 MG Tab  TAKE 1 TABLET(40 MG) BY MOUTH EVERY EVENING 90 tablet 0    tirzepatide (MOUNJARO) 10 mg/0.5 mL PnIj Inject 10 mg into the skin every 7 days. 2 mL 2    tiZANidine (ZANAFLEX) 4 MG tablet Take 1 tablet (4 mg total) by mouth every 6 (six) hours as needed. (Patient not taking: Reported on 5/6/2025) 30 tablet 0    traMADoL (ULTRAM) 50 mg tablet Take 1 tablet (50 mg total) by mouth every 8 (eight) hours as needed for Pain. 15 each 0    TRUE METRIX AIR GLUCOSE METER [facility] USE AS DIRECTED TO TEST BLOOD SUGAR ONCE DAILY 1 each 0

## 2025-05-21 NOTE — PROGRESS NOTES
Case stopped due to increased pt movement during case compromising sterility of procedure despite giving additional IV sedation.

## 2025-05-21 NOTE — DISCHARGE INSTRUCTIONS

## 2025-05-21 NOTE — OP NOTE
Procedure attempted but due to continued movement by the patient after 3mg of Versed and 125mcg of fentanyl the procedure was aborted for safety reasons. We will refer patient to procedure area that can provide propofol sedation.

## 2025-05-22 DIAGNOSIS — E11.9 CONTROLLED TYPE 2 DIABETES MELLITUS WITHOUT COMPLICATION, WITHOUT LONG-TERM CURRENT USE OF INSULIN: ICD-10-CM

## 2025-05-22 RX ORDER — TIRZEPATIDE 10 MG/.5ML
10 INJECTION, SOLUTION SUBCUTANEOUS WEEKLY
Qty: 6 ML | Refills: 0 | Status: SHIPPED | OUTPATIENT
Start: 2025-05-22

## 2025-05-22 NOTE — TELEPHONE ENCOUNTER
Provider Staff:  Action required for this patient    Requires labs      Please see care gap opportunities below in Care Due Message.    Thanks!  [facility]     Appointments      Date Provider   Last Visit   12/26/2024 Miranda Chavez DO   Next Visit   6/26/2025 Miranda Chavez DO     Refill Decision Note   Orlandohaley Quintero  is requesting a refill authorization.  Brief Assessment and Rationale for Refill:  Approve     Medication Therapy Plan:         Comments:     Note composed:11:53 AM 05/22/2025

## 2025-05-22 NOTE — TELEPHONE ENCOUNTER
Care Due:                  Date            Visit Type   Department     Provider  --------------------------------------------------------------------------------                                EP -                              PRIMARY      [facility] INTERNAL  Last Visit: 12-      CARE ([facility])   SHEBA Chavez                              EP -                              PRIMARY      [facility] INTERNAL  Next Visit: 06-      CARE ([facility])   SHEBA Chavez                                                            Last  Test          Frequency    Reason                     Performed    Due Date  --------------------------------------------------------------------------------    HBA1C.......  6 months...  tirzepatide..............  12- 06-    [employer] Embedded Care Due Messages. Reference number: [id number].   5/22/2025 9:53:34 AM CDT   oral

## 2025-05-24 DIAGNOSIS — F32.A ANXIETY AND DEPRESSION: ICD-10-CM

## 2025-05-24 DIAGNOSIS — F41.9 ANXIETY AND DEPRESSION: ICD-10-CM

## 2025-05-24 DIAGNOSIS — E78.5 HYPERLIPIDEMIA LDL GOAL <70: ICD-10-CM

## 2025-05-25 NOTE — TELEPHONE ENCOUNTER
No care due was identified.  [facility] Embedded Care Due Messages. Reference number: [id number].   5/24/2025 11:49:36 PM CDT

## 2025-05-26 ENCOUNTER — TELEPHONE (OUTPATIENT)
Dept: PAIN MEDICINE | Facility: CLINIC | Age: 60
End: 2025-05-26
Payer: COMMERCIAL

## 2025-05-26 RX ORDER — ROSUVASTATIN CALCIUM 40 MG/1
40 TABLET, COATED ORAL NIGHTLY
Qty: 90 TABLET | Refills: 2 | Status: SHIPPED | OUTPATIENT
Start: 2025-05-26

## 2025-05-26 NOTE — TELEPHONE ENCOUNTER
Spoke with patient, and informed patient that I will send Dr. Kruger a message and reach out with an update soon.  Hey Doc, Mrs. Quintero has question regarding her RFA not completed due to movement on her part per what her daughter told her. she wants to know what happen now, please advise

## 2025-05-26 NOTE — TELEPHONE ENCOUNTER
Refill Routing Note   Medication(s) are not appropriate for processing by [facility] for the following reason(s):        Required vitals abnormal    [facility] action(s):  Defer  Approve             Appointments  past 12m or future 3m with PCP    Date Provider   Last Visit   12/26/2024 Miranda Chavez, DO   Next Visit   6/26/2025 Miranda Chavez DO   ED visits in past 90 days: 0        Note composed:12:48 AM 05/26/2025

## 2025-05-26 NOTE — TELEPHONE ENCOUNTER
Return call to patient, inform pt that her order was sent of to spine diagnostics with Dr. Lacy. Pt verbalizes understanding.

## 2025-05-27 ENCOUNTER — PATIENT OUTREACH (OUTPATIENT)
Dept: ADMINISTRATIVE | Facility: HOSPITAL | Age: 60
End: 2025-05-27
Payer: COMMERCIAL

## 2025-05-27 ENCOUNTER — TELEPHONE (OUTPATIENT)
Dept: INTERNAL MEDICINE | Facility: CLINIC | Age: 60
End: 2025-05-27
Payer: COMMERCIAL

## 2025-05-27 RX ORDER — DULOXETIN HYDROCHLORIDE 60 MG/1
60 CAPSULE, DELAYED RELEASE ORAL
Qty: 90 CAPSULE | Refills: 1 | Status: SHIPPED | OUTPATIENT
Start: 2025-05-27

## 2025-05-27 NOTE — TELEPHONE ENCOUNTER
Test results given      Copied from CRM #[id number]. Topic: Medications - Medication Status Check   >> May 27, 2025  3:52 PM Nahomi wrote:  Patient is calling  checking status

## 2025-06-08 NOTE — PROGRESS NOTES
Established Patient       Chief Pain Complaint:  Low back pain     Interval History (6/16/2025):  Patient Leydi Quintero presents today for follow-up visit.  Patient was last seen on 5/21/2025 bilateral L4/5 + L5/S1 RFA but was unable to get procedure despite increased sedation due to movement. It was recommended she be referred to someone who does procedure with formerly [facility]- Dr. Lacy. She continues to have axial low back pain with muscle spasms in the lower back at times that make her restless. Minimal relief with tizanidine. She rates her pain 7/10.  Patient denies night fever/night sweats, urinary incontinence, bowel incontinence, significant weight loss and significant motor weakness.   Patient denies any other complaints or concerns at this time.      Interval History (5/6/2025):  Patient Leydi Quintero presents today for follow-up visit.  Patient was last seen on 11/18/2024 bilateral L4/5 + L5/S1 MBB with 80% relief. She had a bilateral L4/5 + L5/S1 MBB on 7/28/2022 with 90% relief. She rates her pain today a 4/10 and it will go up to an 8/10.  Pain is worse with prolonged standing and she will get relief if she leans forward onto a counter top.  Pain remains primarily axial in the lower back. At times with prolonged activity she will have some referred pain into the thighs.  She is willing to move forward with the radiofrequency ablation.  Patient denies night fever/night sweats, urinary incontinence, bowel incontinence, significant weight loss and significant motor weakness.   Patient denies any other complaints or concerns at this time.      Interval History (10/22/2024):  Patient Leydi Quintero presents today for follow-up visit.  Patient is here for follow up of low back pain. She had a bilateral L4/5 + L5/S1 MBB on 7/28/2022 with 90% relief which provided additional relief for a year. Pain has returned in the same location. Pain is 6/10 and primarily axial in the low back. Pain is  worse with prolonged standing and extending back. At times she has referred pain in the sides of the legs to the feet. She takes tylenol and zanaflex as needed.  Patient denies night fever/night sweats, urinary incontinence, bowel incontinence, significant weight loss and significant motor weakness.   Patient denies any other complaints or concerns at this time.      Interval History (7/20/2022): Leydi Quintero presents today for follow-up on telemedicine visit.  she underwent bilateral SIJ injection on 6/16/22.  The patient reports that she is/was better following the procedure. Reports 90% pain relief on left, 50% pain relief on right.  The changes lasted 4 weeks so far.  The changes have continued through this visit.   Lyrica was started at previous visit, but she ran out so is no longer taking. She continues taking Zanaflex 4mg PRN.    Interval History (4/29/2022):  Leydi Quintero presents today for follow-up visit.  Patient was last seen almost 2 years ago. At that visit, the plan was to get EMG of the legs to evaluate numbness in the feet, which she canceled due to COVID at the time.  She feels the pain worsens with activity.  Pain is intermittent, comes and goes and is never constant.  She has done physical therapy in the past with relief, but the pain returns shortly after she completes the sessions.  She could not tolerate gabapentin in the past.  Lyrica was not approved by insurance.  She was recently given Zanaflex from her primary care, which she took last night, and she got a good night's rest for the 1st time in a while.  She was also given Voltaren gel, which was not yet approved by insurance.    History of Present Illness (6/18/20):   Ms. Quintero returns to clinic for follow-up.  She has obtained her lumbar MRI which we will review today.  She continues have pain located primarily low back in the posterior lower legs.  She rates her pain currently as a 4/10.  She continues to have pain  located in the axial lumbar spine in addition to the bilateral lower extremities.  MRI shows minor facet arthropathy L3/4-L4/5 in addition to minimal left L4/5 neural foraminal stenosis.  She has started on gabapentin however this was causing excessive drowsiness in addition to an increase in appetite therefore she has been switched to Lyrica 50 mg capsules twice daily which she has found to be much more helpful.  She continues to have pain in her lower legs in a nondermatomal distribution but reports that radiates from her low back distally into her feet.  She reports the right leg is worse than the left leg currently.  She completed physical therapy of greater than 6 weeks in February of 2020 and continues to perform therapy exercises at home as tolerated.    Initial HPI (6/4/20):  This patient is a 54 y.o. female who presents today complaining of the above noted pain/s. The patient describes the pain as follows.  Ms. Quintero is new patient clinic with complaints of low back and leg pain for approximately one year.  There was no inciting event and currently rates her pain as 8/10.  She describes mostly a sharp, aching, with numbness and tingling in her legs bilaterally.  Her symptoms worse with walking and standing for long periods of time her somewhat improved with rest and propping her feet up.  She has not really tried anything with medications however she has been physical therapy which he completed 4 months ago greater than 6 weeks physical therapy with some benefit.  She continues to walk for exercise at home in addition to performing physical therapy exercises.  She has tried Tylenol over-the-counter with minimal symptomatic pain relief.  She finds that her right leg seems to be worse in the left leg and she endorses having numbness, weakness, tingling in bilateral lower extremities.  She does wear compression socks which did provide some benefit however these of loss her effectiveness.  She denies having  had surgery injections in her lumbar spine.  She finds that heat and massage do provide some benefit.    Previous Therapy:  Medications:  Tylenol, gabapentin, Lyrica  Injections:   - bilateral SIJ injection on 6/16/22 with 90% pain relief on left, 50% on right  bilateral L4/5 + L5/S1 MBB on 7/28/2022 with 90% relief    Surgeries: No spinal surgery   Physical Therapy: Completed in the Past, completed greater than 6 weeks of physical therapy approximately 4 months ago       Imaging / Labs / Studies (reviewed on 6/16/2025):    06/16/2020 MRI Lumbar Spine Without Contrast  COMPARISON: None    FINDINGS:  The distal cord and conus reveal normal signal and morphology. The lumbar vertebra reveal normal alignment, shape and signal intensity.    T12-L1: Unremarkable.    L1-2:  Unremarkable.  Incidental demonstration of a 3 cm right renal cysts.    L2-3:  Unremarkable.    L3-4:  Minor facet hypertrophy.    L4-5:  Mild disc desiccation with annular bulge.  Left foraminal annular fissure.  Minor left foraminal stenosis.  Minor facet hypertrophy.    L5-S1:  Unremarkable.    Impression  Mild L4-5 degenerative disc disease with annular bulge and left foraminal annular fissure.  Minor left foraminal stenosis.  Minor multilevel facet arthrosis.        Review of Systems:  Constitutional: Negative for fever.   Eyes: Negative for blurred vision.   Respiratory: Negative for cough and wheezing.    Cardiovascular: Negative for chest pain and orthopnea.   Gastrointestinal: Negative for constipation, diarrhea, nausea and vomiting.   Genitourinary: Negative for dysuria.   Musculoskeletal: Positive for back pain.        Lumbar radiculopathy   Skin: Negative for itching and rash.   Neurological: Positive for tingling and weakness.   Endo/Heme/Allergies: Does not bruise/bleed easily.       Physical Exam:    Vitals:    06/16/25 1049   BP: (!) 162/91   BP Location: Left arm   Patient Position: Sitting   Pulse: 73   Resp: 17   Weight: 101.9 kg (224  "lb 12.1 oz)   Height: 5' 5" (1.651 m)           Body mass index is 37.4 kg/m².   (reviewed on 6/16/2025)     General: alert and oriented, in no apparent distress  Gait: normal gait.  Skin: no rashes, no discoloration, no obvious lesions  HEENT: normocephalic, atraumatic. Pupils equal and round.  Cardiovascular: no significant peripheral edema present.  Respiratory: without use of accessory muscles of respiration.    Musculoskeletal - Lumbar Spine:  - Spinal Sensation: Normal   - Pain on flexion of lumbar spine: Absent  - Pain on extension of lumbar spine: Present  - Lumbar facet loading: Present bilaterally  - TTP over the lumbar facet joints: Present   - TTP over the lumbar paraspinals: Present   - TTP over the SI joints: Absent  - TTP over GT bursa: Absent  - Straight Leg Raise: Equivocal       Neuro - Lower Extremities:  - RLE Strength:     >> 5/5 strength with right hip flexion/ extension    >> 5/5 strength with right knee flexion/ extension    >> 5/5 strength in right ankle with dorsiflexion    >> 5/5 strength in right ankle with plantar flexion  - LLE Strength:     >> 5/5 strength with left hip flexion/ extension    >> 5/5 strength with knee flexion extension on the left     >> 5/5 strength in left ankle with dorsiflexion    >> 5/5 strength in left ankle with plantar flexion  - BLE Strength: R/L: HF: 5/5, HE: 5/5, KF: 5/5; KE: 5/5; FE: 5/5; FF: 5/5  - Extremity Reflexes: Brisk and symmetric throughout  - Sensory: Sensation to light touch intact bilaterally      Psych:  Mood and affect is appropriate      Assessment  1. 59 y.o. year old patient with PMH of   Past Medical History:   Diagnosis Date    Allergic rhinitis     Anxiety     Asthma     Diabetes mellitus, type 2     Hyperlipidemia     Hypertension     Personal history of colonic polyps 07/24/2014    Sarcoidosis       presenting with pain located lumbar spine:    ICD-10-CM ICD-9-CM    1. Lumbar spondylosis  M47.816 721.3 Ambulatory referral/consult to " Pain Clinic      2. Facet hypertrophy of lumbar region  M47.816 721.3       3. Bulging lumbar disc  M51.369 722.52                     PLAN:  1. Interventional: referred to Dr. Mariaelena cruz for RFA      S/p bilateral L4/5 + L5/S1 MBB on 7/28/2022 with 90% relief  - S/p bilateral SIJ injection on 6/16/22 with 90% pain relief on left, 50% on right    - If leg pain continues, consider bilateral L4/5 TF MYNOR      2. Pharmacologic:   - Pt Dced lyrica- drowsiness    - DC zanaflex. Trial on robaxin 500mg BID prn. We have discussed potential deleterious side effects associated with this medication including  dizziness, drowsiness, stomach upset, nausea vomiting or blurred vision.  Patient expresses understanding.      - Anticoagulation use: None.     3. Rehabilitative: Encouraged regular exercise. She previously completed greater than 6 weeks physical therapy in the past with minimal symptomatic relief; she continues to walk for exercise and performs physical therapy exercises at home on a daily basis.     4. Diagnostic: BLE EMG/NCS previously not completed. Consider reordering.  Will reorder to evaluate foot numbness, which does not appear to be from lumbar radicular sources.  Has a history of diabetes.  Lumbar MRI reviewed today    5. Follow up: prn    - This condition does not require this patient to take time off of work, and the primary goal of our Pain Management services is to improve the patient's functional capacity.   - I discussed the risks, benefits, and alternatives to potential treatment options. All questions and concerns were fully addressed today in clinic.     Visit today included increased complexity associated with the care of the episodic problem of chronic pain which was addressed and continue to manage the longitudinal care of the patient due to the serious and/or complex managed problem(s) listed above.    Romy Mota NP     Disclaimer:  This note was prepared using voice recognition  system and is likely to have sound alike errors that may have been overlooked even after proof reading.  Please call me with any questions.

## 2025-06-16 ENCOUNTER — OFFICE VISIT (OUTPATIENT)
Dept: PAIN MEDICINE | Facility: CLINIC | Age: 60
End: 2025-06-16
Payer: COMMERCIAL

## 2025-06-16 VITALS
DIASTOLIC BLOOD PRESSURE: 91 MMHG | SYSTOLIC BLOOD PRESSURE: 162 MMHG | RESPIRATION RATE: 17 BRPM | BODY MASS INDEX: 37.44 KG/M2 | WEIGHT: 224.75 LBS | HEIGHT: 65 IN | HEART RATE: 73 BPM

## 2025-06-16 DIAGNOSIS — M47.816 LUMBAR SPONDYLOSIS: Primary | ICD-10-CM

## 2025-06-16 DIAGNOSIS — M47.816 FACET HYPERTROPHY OF LUMBAR REGION: ICD-10-CM

## 2025-06-16 DIAGNOSIS — M51.369 BULGING LUMBAR DISC: ICD-10-CM

## 2025-06-16 PROCEDURE — 3077F SYST BP >= 140 MM HG: CPT | Mod: CPTII,S$GLB,, | Performed by: NURSE PRACTITIONER

## 2025-06-16 PROCEDURE — 1159F MED LIST DOCD IN RCRD: CPT | Mod: CPTII,S$GLB,, | Performed by: NURSE PRACTITIONER

## 2025-06-16 PROCEDURE — 99999 PR PBB SHADOW E&M-EST. PATIENT-LVL IV: CPT | Mod: PBBFAC,,, | Performed by: NURSE PRACTITIONER

## 2025-06-16 PROCEDURE — 99214 OFFICE O/P EST MOD 30 MIN: CPT | Mod: S$GLB,,, | Performed by: NURSE PRACTITIONER

## 2025-06-16 PROCEDURE — G2211 COMPLEX E/M VISIT ADD ON: HCPCS | Mod: S$GLB,,, | Performed by: NURSE PRACTITIONER

## 2025-06-16 PROCEDURE — 3008F BODY MASS INDEX DOCD: CPT | Mod: CPTII,S$GLB,, | Performed by: NURSE PRACTITIONER

## 2025-06-16 PROCEDURE — 3080F DIAST BP >= 90 MM HG: CPT | Mod: CPTII,S$GLB,, | Performed by: NURSE PRACTITIONER

## 2025-06-16 RX ORDER — METHOCARBAMOL 500 MG/1
500 TABLET, FILM COATED ORAL 2 TIMES DAILY PRN
Qty: 60 TABLET | Refills: 0 | Status: SHIPPED | OUTPATIENT
Start: 2025-06-16 | End: 2025-08-15

## 2025-06-25 DIAGNOSIS — E11.9 CONTROLLED TYPE 2 DIABETES MELLITUS WITHOUT COMPLICATION, WITHOUT LONG-TERM CURRENT USE OF INSULIN: ICD-10-CM

## 2025-06-25 RX ORDER — TIRZEPATIDE 10 MG/.5ML
10 INJECTION, SOLUTION SUBCUTANEOUS WEEKLY
Qty: 6 ML | Refills: 1 | Status: SHIPPED | OUTPATIENT
Start: 2025-06-25

## 2025-06-25 RX ORDER — ROSUVASTATIN CALCIUM 40 MG/1
1 TABLET, COATED ORAL EVERY MORNING
COMMUNITY
Start: 2024-09-28 | End: 2025-06-26 | Stop reason: SDUPTHER

## 2025-06-25 NOTE — TELEPHONE ENCOUNTER
Refill Routing Note   Medication(s) are not appropriate for processing by [facility] for the following reason(s):        Required labs outdated    [facility] action(s):  Defer               Appointments  past 12m or future 3m with PCP    Date Provider   Last Visit   12/26/2024 Miranda Chavez, DO   Next Visit   6/26/2025 Miranda Chavez DO   ED visits in past 90 days: 0        Note composed:10:39 AM 06/25/2025

## 2025-06-25 NOTE — TELEPHONE ENCOUNTER
No care due was identified.  [facility] [place] Embedded Care Due Messages. Reference number: [id number].   6/25/2025 8:53:56 AM CDT

## 2025-06-26 ENCOUNTER — OFFICE VISIT (OUTPATIENT)
Dept: INTERNAL MEDICINE | Facility: CLINIC | Age: 60
End: 2025-06-26
Payer: COMMERCIAL

## 2025-06-26 ENCOUNTER — LAB VISIT (OUTPATIENT)
Dept: LAB | Facility: HOSPITAL | Age: 60
End: 2025-06-26
Attending: INTERNAL MEDICINE
Payer: COMMERCIAL

## 2025-06-26 VITALS
RESPIRATION RATE: 20 BRPM | TEMPERATURE: 96 F | HEART RATE: 72 BPM | WEIGHT: 227.75 LBS | SYSTOLIC BLOOD PRESSURE: 124 MMHG | BODY MASS INDEX: 37.95 KG/M2 | OXYGEN SATURATION: 98 % | DIASTOLIC BLOOD PRESSURE: 80 MMHG | HEIGHT: 65 IN

## 2025-06-26 DIAGNOSIS — J45.20 MILD INTERMITTENT ASTHMA WITHOUT COMPLICATION: ICD-10-CM

## 2025-06-26 DIAGNOSIS — Z00.00 ANNUAL PHYSICAL EXAM: Primary | ICD-10-CM

## 2025-06-26 DIAGNOSIS — H10.13 ALLERGIC CONJUNCTIVITIS OF BOTH EYES: ICD-10-CM

## 2025-06-26 DIAGNOSIS — E78.5 HYPERLIPIDEMIA LDL GOAL <70: Chronic | ICD-10-CM

## 2025-06-26 DIAGNOSIS — Z12.4 CERVICAL CANCER SCREENING: ICD-10-CM

## 2025-06-26 DIAGNOSIS — E11.9 CONTROLLED TYPE 2 DIABETES MELLITUS WITHOUT COMPLICATION, WITHOUT LONG-TERM CURRENT USE OF INSULIN: ICD-10-CM

## 2025-06-26 DIAGNOSIS — I10 HYPERTENSION GOAL BP (BLOOD PRESSURE) < 130/80: Chronic | ICD-10-CM

## 2025-06-26 DIAGNOSIS — Z00.00 ANNUAL PHYSICAL EXAM: ICD-10-CM

## 2025-06-26 DIAGNOSIS — Z12.31 ENCOUNTER FOR SCREENING MAMMOGRAM FOR MALIGNANT NEOPLASM OF BREAST: ICD-10-CM

## 2025-06-26 LAB
ABSOLUTE EOSINOPHIL (OHS): 0.23 K/UL
ABSOLUTE MONOCYTE (OHS): 0.38 K/UL (ref 0.3–1)
ABSOLUTE NEUTROPHIL COUNT (OHS): 3.49 K/UL (ref 1.8–7.7)
ALBUMIN SERPL BCP-MCNC: 3.9 G/DL (ref 3.5–5.2)
ALBUMIN/CREAT UR: 3.8 UG/MG
ALP SERPL-CCNC: 84 UNIT/L (ref 40–150)
ALT SERPL W/O P-5'-P-CCNC: 23 UNIT/L (ref 10–44)
ANION GAP (OHS): 10 MMOL/L (ref 8–16)
AST SERPL-CCNC: 22 UNIT/L (ref 11–45)
BASOPHILS # BLD AUTO: 0.04 K/UL
BASOPHILS NFR BLD AUTO: 0.6 %
BILIRUB SERPL-MCNC: 0.4 MG/DL (ref 0.1–1)
BUN SERPL-MCNC: 10 MG/DL (ref 8–23)
CALCIUM SERPL-MCNC: 9.5 MG/DL (ref 8.7–10.5)
CHLORIDE SERPL-SCNC: 111 MMOL/L (ref 95–110)
CHOLEST SERPL-MCNC: 162 MG/DL (ref 120–199)
CHOLEST/HDLC SERPL: 3.1 {RATIO} (ref 2–5)
CO2 SERPL-SCNC: 23 MMOL/L (ref 23–29)
CREAT SERPL-MCNC: 0.8 MG/DL (ref 0.5–1.4)
CREAT UR-MCNC: 157 MG/DL (ref 15–325)
EAG (OHS): 120 MG/DL (ref 68–131)
ERYTHROCYTE [DISTWIDTH] IN BLOOD BY AUTOMATED COUNT: 14.3 % (ref 11.5–14.5)
GFR SERPLBLD CREATININE-BSD FMLA CKD-EPI: >60 ML/MIN/1.73/M2
GLUCOSE SERPL-MCNC: 107 MG/DL (ref 70–110)
HBA1C MFR BLD: 5.8 % (ref 4–5.6)
HCT VFR BLD AUTO: 40.4 % (ref 37–48.5)
HDLC SERPL-MCNC: 53 MG/DL (ref 40–75)
HDLC SERPL: 32.7 % (ref 20–50)
HGB BLD-MCNC: 12.5 GM/DL (ref 12–16)
IMM GRANULOCYTES # BLD AUTO: 0.02 K/UL (ref 0–0.04)
IMM GRANULOCYTES NFR BLD AUTO: 0.3 % (ref 0–0.5)
LDLC SERPL CALC-MCNC: 92.6 MG/DL (ref 63–159)
LYMPHOCYTES # BLD AUTO: 2.03 K/UL (ref 1–4.8)
MCH RBC QN AUTO: 26.9 PG (ref 27–31)
MCHC RBC AUTO-ENTMCNC: 30.9 G/DL (ref 32–36)
MCV RBC AUTO: 87 FL (ref 82–98)
MICROALBUMIN UR-MCNC: 6 UG/ML (ref ?–5000)
NONHDLC SERPL-MCNC: 109 MG/DL
NUCLEATED RBC (/100WBC) (OHS): 0 /100 WBC
PLATELET # BLD AUTO: 223 K/UL (ref 150–450)
PMV BLD AUTO: 12.5 FL (ref 9.2–12.9)
POTASSIUM SERPL-SCNC: 4.2 MMOL/L (ref 3.5–5.1)
PROT SERPL-MCNC: 7.2 GM/DL (ref 6–8.4)
RBC # BLD AUTO: 4.65 M/UL (ref 4–5.4)
RELATIVE EOSINOPHIL (OHS): 3.7 %
RELATIVE LYMPHOCYTE (OHS): 32.8 % (ref 18–48)
RELATIVE MONOCYTE (OHS): 6.1 % (ref 4–15)
RELATIVE NEUTROPHIL (OHS): 56.5 % (ref 38–73)
SODIUM SERPL-SCNC: 144 MMOL/L (ref 136–145)
TRIGL SERPL-MCNC: 82 MG/DL (ref 30–150)
TSH SERPL-ACNC: 1.55 UIU/ML (ref 0.4–4)
WBC # BLD AUTO: 6.19 K/UL (ref 3.9–12.7)

## 2025-06-26 PROCEDURE — 1160F RVW MEDS BY RX/DR IN RCRD: CPT | Mod: CPTII,S$GLB,, | Performed by: INTERNAL MEDICINE

## 2025-06-26 PROCEDURE — 36415 COLL VENOUS BLD VENIPUNCTURE: CPT

## 2025-06-26 PROCEDURE — 84443 ASSAY THYROID STIM HORMONE: CPT

## 2025-06-26 PROCEDURE — 3074F SYST BP LT 130 MM HG: CPT | Mod: CPTII,S$GLB,, | Performed by: INTERNAL MEDICINE

## 2025-06-26 PROCEDURE — 85025 COMPLETE CBC W/AUTO DIFF WBC: CPT

## 2025-06-26 PROCEDURE — 80053 COMPREHEN METABOLIC PANEL: CPT

## 2025-06-26 PROCEDURE — 99396 PREV VISIT EST AGE 40-64: CPT | Mod: S$GLB,,, | Performed by: INTERNAL MEDICINE

## 2025-06-26 PROCEDURE — 80061 LIPID PANEL: CPT

## 2025-06-26 PROCEDURE — 82570 ASSAY OF URINE CREATININE: CPT | Performed by: INTERNAL MEDICINE

## 2025-06-26 PROCEDURE — 1159F MED LIST DOCD IN RCRD: CPT | Mod: CPTII,S$GLB,, | Performed by: INTERNAL MEDICINE

## 2025-06-26 PROCEDURE — 3008F BODY MASS INDEX DOCD: CPT | Mod: CPTII,S$GLB,, | Performed by: INTERNAL MEDICINE

## 2025-06-26 PROCEDURE — 83036 HEMOGLOBIN GLYCOSYLATED A1C: CPT

## 2025-06-26 PROCEDURE — 99999 PR PBB SHADOW E&M-EST. PATIENT-LVL V: CPT | Mod: PBBFAC,,, | Performed by: INTERNAL MEDICINE

## 2025-06-26 PROCEDURE — 3079F DIAST BP 80-89 MM HG: CPT | Mod: CPTII,S$GLB,, | Performed by: INTERNAL MEDICINE

## 2025-06-26 RX ORDER — AZELASTINE HYDROCHLORIDE 0.5 MG/ML
1 SOLUTION/ DROPS OPHTHALMIC 2 TIMES DAILY
Qty: 6 ML | Refills: 0 | Status: SHIPPED | OUTPATIENT
Start: 2025-06-26 | End: 2025-06-26

## 2025-06-26 RX ORDER — AZELASTINE HYDROCHLORIDE 0.5 MG/ML
1 SOLUTION/ DROPS OPHTHALMIC 2 TIMES DAILY
Qty: 18 ML | Refills: 0 | Status: SHIPPED | OUTPATIENT
Start: 2025-06-26

## 2025-06-26 NOTE — PROGRESS NOTES
Leydi Quintero  59 y.o. Black or  female  [id number]    Chief Complaint:  Chief Complaint   Patient presents with    Annual Exam       History of Present Illness:  History of Present Illness    CHIEF COMPLAINT:  Ms. Quintero presents today for an annual exam    HYPERTENSION:  She reports home blood pressure readings of 124/80 and is actively engaged in self-monitoring.    DIABETES:   She is having issues with getting Mounjaro related to her insurance and availability.     HYPERLIPIDEMIA:   She thinks her cholesterol will be better this time. She had ran out of the medication the last time she had labs done.     RESPIRATORY:  Her asthma is well-controlled with minimal current symptoms. She continues prescribed inhaler use with no acute exacerbations or significant respiratory distress.    EYE CONCERNS:  She reports allergic eye symptoms including swelling and eyelids sticking together at night. Eyes appear red. She uses OTC allergy relief eye drops in the mornings for management. She denies significant itching.    GENITOURINARY:  She experiences significant bladder urgency with immediate need to urinate and little warning, particularly notable when lying down at night. She is performing pelvic floor exercises for bladder control. She attributes these symptoms may be age-related as she approaches 60.    BACK PAIN:  Previous medication treatment for back condition was effective. She is awaiting a nerve burning procedure for ongoing management.    PREVENTIVE CARE:  She has not received COVID vaccination since initial outbreak. She currently does not have a gynecologist and has no scheduled Pap smear.         Review of Systems   Constitutional:  Negative for fever.   Eyes:  Positive for redness. Negative for pain.   Respiratory:  Negative for cough and shortness of breath.    Cardiovascular:  Negative for chest pain.   Gastrointestinal:  Negative for abdominal pain.   Genitourinary:  Positive for  urgency. Negative for dysuria.   Musculoskeletal:  Positive for back pain.   Neurological:  Negative for dizziness and headaches.       Laboratory  Lab Results   Component Value Date    WBC 5.90 06/26/2024    HGB 12.5 06/26/2024    HCT 39.3 06/26/2024     06/26/2024    CHOL 231 (H) 12/26/2024    TRIG 141 12/26/2024    HDL 46 12/26/2024    ALT 18 12/26/2024    AST 21 12/26/2024     12/26/2024    K 3.7 12/26/2024     (H) 12/26/2024    CREATININE 0.9 12/26/2024    BUN 7 12/26/2024    CO2 23 12/26/2024    TSH 1.230 12/19/2023    HGBA1C 5.7 (H) 12/26/2024     Lab Results   Component Value Date    LDLCALC 156.8 12/26/2024       The following were reviewed: Active problem list, medication list, allergies, family history, social history, and Health Maintenance.     History:  Past Medical History:   Diagnosis Date    Allergic rhinitis     Anxiety     Asthma     Diabetes mellitus, type 2     Hyperlipidemia     Hypertension     Personal history of colonic polyps 07/24/2014    Sarcoidosis      Past Surgical History:   Procedure Laterality Date    COLONOSCOPY N/A 07/19/2022    Procedure: COLONOSCOPY;  Surgeon: Elisabeth Rodriguez MD;  Location: [facility];  Service: Endoscopy;  Laterality: N/A;    FOOT SURGERY  at 9y/o    arch lift    INJECTION OF ANESTHETIC AGENT AROUND MEDIAL BRANCH NERVES INNERVATING LUMBAR FACET JOINT Bilateral 07/28/2022    Procedure: diagnostic bilateral L3-5 MBB;  Surgeon: Nick Kruger MD;  Location: [facility];  Service: Pain Management;  Laterality: Bilateral;    INJECTION OF ANESTHETIC AGENT AROUND MEDIAL BRANCH NERVES INNERVATING LUMBAR FACET JOINT Bilateral 11/18/2024    Procedure: Bilateral L4-5 and L5-S1 MBB;  Surgeon: Nick Kruger MD;  Location: [facility];  Service: Pain Management;  Laterality: Bilateral;    INJECTION OF ANESTHETIC AGENT INTO SACROILIAC JOINT Bilateral 06/16/2022    Procedure: Bilateral SIJ Injection;  Surgeon: Sebastien Cameron MD;  Location:  [facility];  Service: Pain Management;  Laterality: Bilateral;    RADIOFREQUENCY ABLATION OF LUMBAR MEDIAL BRANCH NERVE AT SINGLE LEVEL Bilateral 5/21/2025    Procedure: bilateral L4/5 & L5/S1 RFA;  Surgeon: Nick Kruger MD;  Location: [facility];  Service: Pain Management;  Laterality: Bilateral;     Family History   Problem Relation Name Age of Onset    Diabetes Mother Sari     Hypertension Mother Sari     Hypertension Father Ever     Hyperlipidemia Father Ever     Breast cancer Sister  58    Colon cancer Neg Hx      Ovarian cancer Neg Hx       Social History[1]  Problem List[2]  Review of patient's allergies indicates:   Allergen Reactions    Lipitor [atorvastatin]      Other reaction(s): Muscle pain       Health Maintenance  Health Maintenance Topics with due status: Not Due       Topic Last Completion Date    Colorectal Cancer Screening 07/19/2022    Lipid Panel 12/26/2024    Low Dose Statin 06/26/2025     Health Maintenance Due   Topic Date Due    TETANUS VACCINE  05/12/2021    Diabetic Eye Exam  09/23/2022    RSV Vaccine (Age 60+ and Pregnant patients) (1 - Risk 60-74 years 1-dose series) Never done    COVID-19 Vaccine (7 - 2024-25 season) 09/01/2024    Cervical Cancer Screening  11/11/2024    Mammogram  02/05/2025    Diabetes Urine Screening  06/26/2025    Foot Exam  06/26/2025    Hemoglobin A1c  06/26/2025       Medications:  Medications Ordered Prior to Encounter[3]    Medications have been reviewed and reconciled with patient at visit today.    Exam:  Vitals:    06/26/25 0823   BP: 124/80   Pulse: 72   Resp: 20   Temp: 96.4 °F (35.8 °C)     Weight: 103.3 kg (227 lb 11.8 oz)   Body mass index is 37.9 kg/m².      Physical Exam    Vitals: Reviewed. Blood pressure: 124/80.  Constitutional: No acute distress. Well-developed. Not ill-appearing.  Eyes: No scleral icterus. Eyes appear red.  Cardiovascular: Normal rate and regular rhythm. Normal heart sounds.  Pulmonary: Pulmonary effort is  normal. No respiratory distress. Normal breath sounds.  Abdomen: Soft. Nontender. Nondistended. Normoactive bowel sounds.  Extremities: No edema. Mild edema in feet bilaterally.  Skin: Warm. Dry.  Neurological: Alert and oriented to person, place, and time.  Psychiatric: Behavior normal.     Physical Exam  Cardiovascular:      Pulses:           Dorsalis pedis pulses are 2+ on the right side and 2+ on the left side.   Feet:      Right foot:      Protective Sensation: 5 sites tested.  5 sites sensed.      Skin integrity: No ulcer.      Left foot:      Protective Sensation: 5 sites tested.  5 sites sensed.      Skin integrity: No ulcer.         Assessment:  The primary encounter diagnosis was Annual physical exam. Diagnoses of Hypertension goal BP (blood pressure) < 130/80, Controlled type 2 diabetes mellitus without complication, without long-term current use of insulin, Hyperlipidemia LDL goal <70, Mild intermittent asthma without complication, Allergic conjunctivitis of both eyes, Encounter for screening mammogram for malignant neoplasm of breast, and Cervical cancer screening were also pertinent to this visit.    Assessment & Plan    E11.9 Controlled type 2 diabetes mellitus without complication, without long-term current use of insulin  Z00.00 Annual physical exam  I10 Hypertension goal BP (blood pressure) < 130/80  E78.5 Hyperlipidemia LDL goal <70  J45.20 Mild intermittent asthma without complication  H10.13 Allergic conjunctivitis of both eyes  Z12.31 Encounter for screening mammogram for malignant neoplasm of breast  Z12.4 Cervical cancer screening    ANNUAL PHYSICAL EXAM:  - Counseled regarding age appropriate screenings and immunizations  - Counseled regarding lifestyle modifications- Ordered A1C, CMP, CBC, cholesterol, thyroid, and potassium.  - Follow up for tetanus vaccine, as patient opted to wait.    HYPERTENSION GOAL BP (BLOOD PRESSURE) < 130/80:  - BP improved to 124/80.  - Ordered CMP and  potassium.    CONTROLLED TYPE 2 DIABETES MELLITUS WITHOUT COMPLICATION, WITHOUT LONG-TERM CURRENT USE OF INSULIN:  - A1C decreased to 5.7 in December.  - Performed foot exam to assess circulation and sensation.  - Ordered A1C, CMP, and lipid panel    HYPERLIPIDEMIA LDL GOAL <70:  - Cholesterol levels elevated in December due to patient misplacing medication.  - Ordered cholesterol.    ASTHMA:   - Continued albuterol inhaler as needed    ALLERGIC CONJUNCTIVITIS OF BOTH EYES:  - Started prescription-strength eye drops for allergy symptoms.    CERVICAL CANCER SCREENING:  - Referred to gynecologist for Pap smear and will be contacted to schedule appointment.                      [1]   Social History  Socioeconomic History    Marital status:     Number of children: 1   Occupational History     Employer: [employer]    Tobacco Use    Smoking status: Never    Smokeless tobacco: Never   Substance and Sexual Activity    Alcohol use: No    Drug use: No    Sexual activity: Yes     Partners: Male     Birth control/protection: Post-menopausal   [2]   Patient Active Problem List  Diagnosis    Hyperlipidemia LDL goal <70    Hypertension goal BP (blood pressure) < 130/80    Obesity (BMI 30-39.9)    Controlled type 2 diabetes mellitus without complication, without long-term current use of insulin    Mild intermittent asthma without complication    Severe obesity (BMI 35.0-39.9) with comorbidity   [3]   Current Outpatient Medications on File Prior to Visit   Medication Sig Dispense Refill    albuterol (VENTOLIN HFA) 90 mcg/actuation inhaler Inhale 2 puffs into the lungs every 6 (six) hours as needed for Wheezing. 18 g 3    amLODIPine (NORVASC) 5 MG tablet TAKE 1 TABLET(5 MG) BY MOUTH EVERY DAY 90 tablet 3    blood sugar diagnostic Strp Once daily glucose testing. 50 strip 6    diclofenac sodium (VOLTAREN) 1 % Gel Apply 2 g topically 4 (four) times daily. 50 g 0    DULoxetine (CYMBALTA) 60 MG capsule TAKE 1 CAPSULE(60  MG) BY MOUTH EVERY DAY 90 capsule 1    hydrOXYzine HCL (ATARAX) 25 MG tablet Take 1 tablet (25 mg total) by mouth 2 (two) times daily as needed for Anxiety. 60 tablet 5    ketoconazole (NIZORAL) 2 % shampoo Apply topically.      lancets (LANCETS,THIN) Misc Once daily glucose testing. 50 each 6    meclizine (ANTIVERT) 25 mg tablet Take 1 tablet (25 mg total) by mouth 3 (three) times daily as needed for Dizziness. 30 tablet 0    methocarbamoL (ROBAXIN) 500 MG Tab Take 1 tablet (500 mg total) by mouth 2 (two) times daily as needed. 60 tablet 0    montelukast (SINGULAIR) 10 mg tablet Take 1 tablet (10 mg total) by mouth every evening. 90 tablet 1    oxybutynin (DITROPAN) 5 MG Tab TAKE 1 TABLET(5 MG) BY MOUTH TWICE DAILY 180 tablet 0    rosuvastatin (CRESTOR) 40 MG Tab TAKE 1 TABLET(40 MG) BY MOUTH EVERY EVENING 90 tablet 2    tirzepatide (MOUNJARO) 10 mg/0.5 mL PnIj Inject 10 mg into the skin once a week. 6 mL 1    traMADoL (ULTRAM) 50 mg tablet Take 1 tablet (50 mg total) by mouth every 8 (eight) hours as needed for Pain. 15 each 0    TRUE METRIX AIR GLUCOSE METER Misc USE AS DIRECTED TO TEST BLOOD SUGAR ONCE DAILY 1 each 0    [DISCONTINUED] rosuvastatin (CRESTOR) 40 MG Tab Take 1 tablet by mouth every morning. (Patient not taking: Reported on 6/26/2025)       No current facility-administered medications on file prior to visit.

## 2025-06-30 ENCOUNTER — RESULTS FOLLOW-UP (OUTPATIENT)
Dept: INTERNAL MEDICINE | Facility: CLINIC | Age: 60
End: 2025-06-30

## 2025-07-03 ENCOUNTER — RESULTS FOLLOW-UP (OUTPATIENT)
Dept: INTERNAL MEDICINE | Facility: CLINIC | Age: 60
End: 2025-07-03

## 2025-07-07 ENCOUNTER — HOSPITAL ENCOUNTER (OUTPATIENT)
Dept: RADIOLOGY | Facility: HOSPITAL | Age: 60
Discharge: HOME | End: 2025-07-07
Attending: INTERNAL MEDICINE
Payer: COMMERCIAL

## 2025-07-07 DIAGNOSIS — Z12.31 ENCOUNTER FOR SCREENING MAMMOGRAM FOR MALIGNANT NEOPLASM OF BREAST: ICD-10-CM

## 2025-07-07 PROCEDURE — 77063 BREAST TOMOSYNTHESIS BI: CPT | Mod: 26,,, | Performed by: RADIOLOGY

## 2025-07-07 PROCEDURE — 77063 BREAST TOMOSYNTHESIS BI: CPT | Mod: TC

## 2025-07-07 PROCEDURE — 77067 SCR MAMMO BI INCL CAD: CPT | Mod: 26,,, | Performed by: RADIOLOGY

## 2025-07-17 ENCOUNTER — OFFICE VISIT (OUTPATIENT)
Dept: OBSTETRICS AND GYNECOLOGY | Facility: CLINIC | Age: 60
End: 2025-07-17
Payer: COMMERCIAL

## 2025-07-17 VITALS
HEIGHT: 65 IN | WEIGHT: 228.63 LBS | SYSTOLIC BLOOD PRESSURE: 124 MMHG | DIASTOLIC BLOOD PRESSURE: 76 MMHG | BODY MASS INDEX: 38.09 KG/M2

## 2025-07-17 DIAGNOSIS — Z12.4 SCREENING FOR CERVICAL CANCER: ICD-10-CM

## 2025-07-17 DIAGNOSIS — Z12.4 CERVICAL CANCER SCREENING: ICD-10-CM

## 2025-07-17 DIAGNOSIS — Z01.419 WOMEN'S ANNUAL ROUTINE GYNECOLOGICAL EXAMINATION: Primary | ICD-10-CM

## 2025-07-17 PROCEDURE — 99999 PR PBB SHADOW E&M-EST. PATIENT-LVL IV: CPT | Mod: PBBFAC,,, | Performed by: STUDENT IN AN ORGANIZED HEALTH CARE EDUCATION/TRAINING PROGRAM

## 2025-07-17 NOTE — PROGRESS NOTES
"Annual Gynecologic Visit    Patient ID: Leydi Quintero is a 59 y.o. female MRN: [id number]     Chief Complaint:  Well Woman     Subjective   History of Present Illness  Leydi Quintero is a 59 y.o. female who  has a past medical history of Allergic rhinitis, Anxiety, Asthma, Diabetes mellitus, type 2, Hyperlipidemia, Hypertension, Personal history of colonic polyps (07/24/2014), and Sarcoidosis. here today for Well Woman  .    Annual Exam-Postmenopausal  Patient presents for annual exam. The patient has no complaints today. The patient is sexually active, some pain during.     Last menses was about 5-6 years.   The patient is taking hormone replacement therapy. Patient denies post-menopausal vaginal bleeding. The patient wears seatbelts: yes. The patient participates in regular exercise: sometimes, usually walking. The patient reports that there is not domestic violence in her life.      Lab Results   Component Value Date    [id number] Negative 11/11/2019    [id number] Negative 11/11/2019     Lab Results   Component Value Date    FPATHDX  07/19/2022     1. Ascending colon, biopsy:      - Tubular adenoma  2. "abnormal mucosa", biopsy:      - Colonic mucosa with neutrophils, crytp drop out and congestion, see note  Note: These findings are most consistent with ischemic colitis, however the  histologic differential also includes medication induced injury and  infection. Correlation with endoscopic findings is recommended. This part is  also reviewed by Dr. NAWAF Quevedo, who concurs.      FPATHDX  11/11/2019     Specimen Adequacy  Satisfactory for interpretation. Endocervical component is absent.    [place] Category  Negative for intraepithelial lesion or malignancy.        Results for orders placed during the hospital encounter of 07/07/25    Mammo Digital Screening Bilat w/ Devon (XPD)    Narrative  Facility:  [facility] [address] [place] [place] " [address]  [phone]    Name: Leydi Quintero    MRN: [id number]    Result:  Mammo Digital Screening Bilat w/ Devon (XPD)    History:  Patient is 59 y.o. and is seen for a screening mammogram.      Films Compared:  Compared to: 2024 Mammo Digital Screening Bilat w/ Devon and 2022 Mammo Digital Screening Bilat w/ Devon    Findings:  This procedure was performed using tomosynthesis.  Computer-aided detection was utilized in the interpretation of this examination.    There are scattered areas of fibroglandular density. There is no evidence of suspicious masses, microcalcifications or architectural distortion.    Impression  No mammographic evidence of malignancy.    BI-RADS Category 1: Negative    Recommendation:  Routine screening mammogram in 1 year is recommended.    Your estimated lifetime risk of breast cancer (to age 85) based on Tyrer-Cuzick risk assessment model is 18.89%.  According to the American Cancer Society, patients with a lifetime breast cancer risk of 20% or higher might benefit from supplemental screening tests, such as screening breast MRI.    Electronically signed by,  Wallace Jose MD           HPI obtained from online patient questionnaire:  HPI    GYN & OB History  OB History   OB History    Para Term  AB Living   2 1 1  1 1   SAB IAB Ectopic Multiple Live Births   1          # Outcome Date GA Lbr Ekm/2nd Weight Sex Type Anes PTL Lv   2 SAB            1 Term      Vag-Spont          Patient's last menstrual period was 2013.   Last Pap Date: 11/15/2019  Age of Menarche:11    Past Medical History:   Diagnosis Date    Allergic rhinitis     Anxiety     Asthma     Diabetes mellitus, type 2     Hyperlipidemia     Hypertension     Personal history of colonic polyps 2014    Sarcoidosis      Past Surgical History:   Procedure Laterality Date    COLONOSCOPY N/A 2022    Procedure: COLONOSCOPY;  Surgeon: Elisabeth Rodriguez MD;  Location: [facility];  Service:  Endoscopy;  Laterality: N/A;    FOOT SURGERY  at 11y/o    arch lift    INJECTION OF ANESTHETIC AGENT AROUND MEDIAL BRANCH NERVES INNERVATING LUMBAR FACET JOINT Bilateral 07/28/2022    Procedure: diagnostic bilateral L3-5 MBB;  Surgeon: Nick Kruger MD;  Location: [facility];  Service: Pain Management;  Laterality: Bilateral;    INJECTION OF ANESTHETIC AGENT AROUND MEDIAL BRANCH NERVES INNERVATING LUMBAR FACET JOINT Bilateral 11/18/2024    Procedure: Bilateral L4-5 and L5-S1 MBB;  Surgeon: Nick Kruger MD;  Location: [facility];  Service: Pain Management;  Laterality: Bilateral;    INJECTION OF ANESTHETIC AGENT INTO SACROILIAC JOINT Bilateral 06/16/2022    Procedure: Bilateral SIJ Injection;  Surgeon: Sebastien Cameron MD;  Location: [facility];  Service: Pain Management;  Laterality: Bilateral;    RADIOFREQUENCY ABLATION OF LUMBAR MEDIAL BRANCH NERVE AT SINGLE LEVEL Bilateral 5/21/2025    Procedure: bilateral L4/5 & L5/S1 RFA;  Surgeon: Nick Kruger MD;  Location: [facility];  Service: Pain Management;  Laterality: Bilateral;     Review of patient's allergies indicates:   Allergen Reactions    Lipitor [atorvastatin]      Other reaction(s): Muscle pain     Current Outpatient Medications   Medication Instructions    albuterol (VENTOLIN HFA) 90 mcg/actuation inhaler 2 puffs, Inhalation, Every 6 hours PRN    amLODIPine (NORVASC) 5 MG tablet TAKE 1 TABLET(5 MG) BY MOUTH EVERY DAY    azelastine (OPTIVAR) 0.05 % ophthalmic solution 1 drop, Both Eyes, 2 times daily    blood sugar diagnostic Strp Once daily glucose testing.    diclofenac sodium (VOLTAREN) 2 g, Topical (Top), 4 times daily    DULoxetine (CYMBALTA) 60 mg, Oral    hydrOXYzine HCL (ATARAX) 25 mg, Oral, 2 times daily PRN    ketoconazole (NIZORAL) 2 % shampoo Apply topically.    lancets (LANCETS,THIN) Misc Once daily glucose testing.    meclizine (ANTIVERT) 25 mg, Oral, 3 times daily PRN    methocarbamoL (ROBAXIN) 500 mg, Oral, 2 times  daily PRN    montelukast (SINGULAIR) 10 mg, Oral, Nightly    MOUNJARO 10 mg, Subcutaneous, Weekly    oxybutynin (DITROPAN) 5 mg, Oral, 2 times daily    rosuvastatin (CRESTOR) 40 mg, Oral, Nightly    traMADoL (ULTRAM) 50 mg, Oral, Every 8 hours PRN    TRUE METRIX AIR GLUCOSE METER Misc USE AS DIRECTED TO TEST BLOOD SUGAR ONCE DAILY     Social History: She  reports that she has never smoked. She has never used smokeless tobacco. She reports that she does not drink alcohol and does not use drugs.  Family History: family history includes Breast cancer (age of onset: 58) in her sister; Diabetes in her mother; Hyperlipidemia in her father; Hypertension in her father and mother.     Review of Systems  Review of Systems      Objective   Physical Exam:   Constitutional: She is oriented to person, place, and time. She appears well-developed and well-nourished. No distress.    HENT:   Head: Normocephalic.    Eyes: EOM are normal.     Cardiovascular:  Normal rate.             Pulmonary/Chest: Effort normal.        Abdominal: Soft. She exhibits no distension. There is no abdominal tenderness.     Genitourinary:    Urethra, bladder, vagina and uterus normal.      Pelvic exam was performed with patient supine.   The external female genitalia was normal.     Labial bartholins normal.Cervix is normal. Right adnexum displays no mass, no tenderness and no fullness. Left adnexum displays no mass, no tenderness and no fullness. No vaginal discharge, tenderness or bleeding in the vagina.    No foreign body in the vagina.   Cervix exhibits no motion tenderness, no lesion, no discharge, no friability and no tenderness. Uterus is not tender. Normal urethral meatus.   Genitourinary Comments: Mild atrophy, well moisturized             Musculoskeletal: Normal range of motion and moves all extremeties.       Neurological: She is alert and oriented to person, place, and time.    Skin: Skin is warm and dry. She is not diaphoretic.    Psychiatric:  She has a normal mood and affect. Her behavior is normal.         Assessment and Plan   Leydi Quintero is an 59 y.o. year old female here for Well Woman      Yearly Exam  Return in one year for annual examination     Health maintenance  Encouraged to exercise regularly and to update immunizations.  STD screening offered   Cervical Cancer Screening:  Last Pap 2019.  Breast Cancer Screening: discussed self breast examinations, no clinical breast exam today, need for Mammogram order reviewed in patient chart  Colon Cancer Screening: next due 2027 (last 2022 in EPIC system), patient reports that she may have had a colonoscopy after 2022 and will review her records.  CV risk factors and lipids screening per PCP  Bone Health: encouraged adequate amounts of Vitamin D and Calcium and weight bearing exercises; Plan DEXA screening at 62yo      Yearly Exam  Return in one year for annual examination    1. Women's annual routine gynecological examination  -     Liquid-Based Pap Smear, Screening    2. Cervical cancer screening  -     Ambulatory referral/consult to Gynecology    3. Screening for cervical cancer  -     Liquid-Based Pap Smear, Screening        Lucina Sandoval MD  Obstetrics and Gynecology  [facility] [place]

## 2025-08-28 ENCOUNTER — TELEPHONE (OUTPATIENT)
Dept: PAIN MEDICINE | Facility: CLINIC | Age: 60
End: 2025-08-28
Payer: COMMERCIAL

## 2025-08-28 DIAGNOSIS — H10.13 ALLERGIC CONJUNCTIVITIS OF BOTH EYES: ICD-10-CM

## 2025-09-02 DIAGNOSIS — M79.651 PAIN IN RIGHT THIGH: ICD-10-CM

## 2025-09-02 DIAGNOSIS — S70.11XA HEMATOMA OF RIGHT THIGH, INITIAL ENCOUNTER: ICD-10-CM

## 2025-09-02 RX ORDER — AZELASTINE HYDROCHLORIDE 0.5 MG/ML
1 SOLUTION/ DROPS OPHTHALMIC 2 TIMES DAILY
Qty: 18 ML | Refills: 0 | Status: SHIPPED | OUTPATIENT
Start: 2025-09-02

## 2025-09-03 RX ORDER — TRAMADOL HYDROCHLORIDE 50 MG/1
50 TABLET, FILM COATED ORAL EVERY 8 HOURS PRN
Qty: 15 EACH | Refills: 0 | Status: SHIPPED | OUTPATIENT
Start: 2025-09-03